# Patient Record
Sex: MALE | Race: WHITE | NOT HISPANIC OR LATINO | URBAN - METROPOLITAN AREA
[De-identification: names, ages, dates, MRNs, and addresses within clinical notes are randomized per-mention and may not be internally consistent; named-entity substitution may affect disease eponyms.]

---

## 2019-01-30 ENCOUNTER — APPOINTMENT (RX ONLY)
Dept: URBAN - METROPOLITAN AREA CLINIC 31 | Facility: CLINIC | Age: 59
Setting detail: DERMATOLOGY
End: 2019-01-30

## 2019-01-30 DIAGNOSIS — L82.1 OTHER SEBORRHEIC KERATOSIS: ICD-10-CM

## 2019-01-30 DIAGNOSIS — L81.4 OTHER MELANIN HYPERPIGMENTATION: ICD-10-CM

## 2019-01-30 DIAGNOSIS — D18.0 HEMANGIOMA: ICD-10-CM

## 2019-01-30 DIAGNOSIS — L57.0 ACTINIC KERATOSIS: ICD-10-CM

## 2019-01-30 DIAGNOSIS — D22 MELANOCYTIC NEVI: ICD-10-CM

## 2019-01-30 DIAGNOSIS — D485 NEOPLASM OF UNCERTAIN BEHAVIOR OF SKIN: ICD-10-CM

## 2019-01-30 PROBLEM — D18.01 HEMANGIOMA OF SKIN AND SUBCUTANEOUS TISSUE: Status: ACTIVE | Noted: 2019-01-30

## 2019-01-30 PROBLEM — D22.5 MELANOCYTIC NEVI OF TRUNK: Status: ACTIVE | Noted: 2019-01-30

## 2019-01-30 PROBLEM — D48.5 NEOPLASM OF UNCERTAIN BEHAVIOR OF SKIN: Status: ACTIVE | Noted: 2019-01-30

## 2019-01-30 PROBLEM — J30.1 ALLERGIC RHINITIS DUE TO POLLEN: Status: ACTIVE | Noted: 2019-01-30

## 2019-01-30 PROCEDURE — ? BIOPSY BY SHAVE METHOD

## 2019-01-30 PROCEDURE — ? LIQUID NITROGEN

## 2019-01-30 PROCEDURE — ? SUNSCREEN RECOMMENDATIONS

## 2019-01-30 PROCEDURE — 11102 TANGNTL BX SKIN SINGLE LES: CPT

## 2019-01-30 PROCEDURE — ? COUNSELING

## 2019-01-30 PROCEDURE — 99214 OFFICE O/P EST MOD 30 MIN: CPT | Mod: 25

## 2019-01-30 PROCEDURE — 17000 DESTRUCT PREMALG LESION: CPT | Mod: 59

## 2019-01-30 ASSESSMENT — LOCATION DETAILED DESCRIPTION DERM
LOCATION DETAILED: NASAL DORSUM
LOCATION DETAILED: LEFT SUPERIOR MEDIAL UPPER BACK
LOCATION DETAILED: PERIUMBILICAL SKIN
LOCATION DETAILED: SUPERIOR THORACIC SPINE

## 2019-01-30 ASSESSMENT — LOCATION SIMPLE DESCRIPTION DERM
LOCATION SIMPLE: ABDOMEN
LOCATION SIMPLE: NOSE
LOCATION SIMPLE: UPPER BACK
LOCATION SIMPLE: LEFT UPPER BACK

## 2019-01-30 ASSESSMENT — LOCATION ZONE DERM
LOCATION ZONE: TRUNK
LOCATION ZONE: NOSE

## 2019-01-30 NOTE — PROCEDURE: BIOPSY BY SHAVE METHOD
Billing Type: Third-Party Bill
Type Of Destruction Used: Electrodesiccation
Anesthesia Volume In Cc (Will Not Render If 0): 0.2
X Size Of Lesion In Cm: 0
Bill 31054 For Specimen Handling/Conveyance To Laboratory?: no
Biopsy Type: H and E
Was A Bandage Applied: Yes
Detail Level: Detailed
Silver Nitrate Text: The wound bed was treated with silver nitrate after the biopsy was performed.
Hemostasis: Drysol
Biopsy Method: double edge Personna blade
Electrodesiccation And Curettage Text: The wound bed was treated with electrodesiccation and curettage after the biopsy was performed.
Post-Care Instructions: I reviewed with the patient in detail post-care instructions. Patient is to keep the biopsy covered and apply vaseline twice daily until healed.
Dressing: bandage
Consent: Written consent was obtained and risks were reviewed including but not limited to scarring, infection, bleeding, scabbing, incomplete removal, nerve damage and allergy to anesthesia.
Lab: -20
Notification Instructions: Patient will be notified of biopsy results. However, patient instructed to call the office if not contacted within 2 weeks.
Curettage Text: The wound bed was treated with curettage after the biopsy was performed.
Wound Care: Petrolatum
Lab Facility: [id number]
Electrodesiccation Text: The wound bed was treated with electrodesiccation after the biopsy was performed.
Depth Of Biopsy: dermis
Anesthesia Type: 1% lidocaine without epinephrine
Cryotherapy Text: The wound bed was treated with cryotherapy after the biopsy was performed.

## 2019-01-30 NOTE — PROCEDURE: LIQUID NITROGEN
Post-Care Instructions: I reviewed with the patient in detail post-care instructions. Patient is to wear sunprotection, and avoid picking at any of the treated lesions. Pt may apply Vaseline to crusted or scabbing areas.
Render Post-Care Instructions In Note?: yes
Detail Level: Detailed
Duration Of Freeze Thaw-Cycle (Seconds): 3
Consent: The patient's consent was obtained including but not limited to risks of crusting, scabbing, blistering, scarring, darker or lighter pigmentary change, recurrence, incomplete removal and infection.
Number Of Freeze-Thaw Cycles: 1 freeze-thaw cycle

## 2019-04-11 ENCOUNTER — APPOINTMENT (RX ONLY)
Dept: URBAN - METROPOLITAN AREA CLINIC 26 | Facility: CLINIC | Age: 59
Setting detail: DERMATOLOGY
End: 2019-04-11

## 2019-04-11 DIAGNOSIS — D22 MELANOCYTIC NEVI: ICD-10-CM

## 2019-04-11 PROBLEM — D22.5 MELANOCYTIC NEVI OF TRUNK: Status: ACTIVE | Noted: 2019-04-11

## 2019-04-11 PROCEDURE — 11402 EXC TR-EXT B9+MARG 1.1-2 CM: CPT

## 2019-04-11 PROCEDURE — ? EXCISION

## 2019-04-11 PROCEDURE — 12032 INTMD RPR S/A/T/EXT 2.6-7.5: CPT

## 2019-04-11 ASSESSMENT — LOCATION SIMPLE DESCRIPTION DERM: LOCATION SIMPLE: LEFT UPPER BACK

## 2019-04-11 ASSESSMENT — LOCATION ZONE DERM: LOCATION ZONE: TRUNK

## 2019-04-11 ASSESSMENT — LOCATION DETAILED DESCRIPTION DERM: LOCATION DETAILED: LEFT SUPERIOR MEDIAL UPPER BACK

## 2019-04-26 ENCOUNTER — APPOINTMENT (RX ONLY)
Dept: URBAN - METROPOLITAN AREA CLINIC 26 | Facility: CLINIC | Age: 59
Setting detail: DERMATOLOGY
End: 2019-04-26

## 2019-04-26 DIAGNOSIS — Z48.817 ENCOUNTER FOR SURGICAL AFTERCARE FOLLOWING SURGERY ON THE SKIN AND SUBCUTANEOUS TISSUE: ICD-10-CM

## 2019-04-26 PROCEDURE — ? PHOTO-DOCUMENTATION

## 2019-04-26 PROCEDURE — ? SUTURE REMOVAL (GLOBAL PERIOD)

## 2019-04-26 PROCEDURE — 99024 POSTOP FOLLOW-UP VISIT: CPT

## 2019-04-26 PROCEDURE — ? POST-OP WOUND CHECK

## 2019-04-26 ASSESSMENT — LOCATION DETAILED DESCRIPTION DERM: LOCATION DETAILED: RIGHT SUPERIOR UPPER BACK

## 2019-04-26 ASSESSMENT — LOCATION SIMPLE DESCRIPTION DERM: LOCATION SIMPLE: RIGHT UPPER BACK

## 2019-04-26 ASSESSMENT — LOCATION ZONE DERM: LOCATION ZONE: TRUNK

## 2019-04-26 NOTE — PROCEDURE: SUTURE REMOVAL (GLOBAL PERIOD)
Detail Level: Detailed
Add 56212 Cpt? (Important Note: In 2017 The Use Of 60181 Is Being Tracked By Cms To Determine Future Global Period Reimbursement For Global Periods): yes

## 2019-04-26 NOTE — PROCEDURE: POST-OP WOUND CHECK
Add 79781 Cpt? (Important Note: In 2017 The Use Of 60031 Is Being Tracked By Cms To Determine Future Global Period Reimbursement For Global Periods): yes
Detail Level: Detailed

## 2020-01-01 NOTE — PROCEDURE: EXCISION
EOAE (evoked otoacoustic emission) Keystone Flap Text: The defect edges were debeveled with a #15 scalpel blade.  Given the location of the defect, shape of the defect a keystone flap was deemed most appropriate.  Using a sterile surgical marker, an appropriate keystone flap was drawn incorporating the defect, outlining the appropriate donor tissue and placing the expected incisions within the relaxed skin tension lines where possible. The area thus outlined was incised deep to adipose tissue with a #15 scalpel blade.  The skin margins were undermined to an appropriate distance in all directions around the primary defect and laterally outward around the flap utilizing iris scissors.

## 2021-01-08 ENCOUNTER — HOSPITAL ENCOUNTER (OUTPATIENT)
Dept: RADIOLOGY | Facility: CLINIC | Age: 61
Discharge: HOME | End: 2021-01-08
Attending: PSYCHIATRY & NEUROLOGY
Payer: COMMERCIAL

## 2021-01-08 VITALS — WEIGHT: 194 LBS | HEIGHT: 70 IN | BODY MASS INDEX: 27.77 KG/M2

## 2021-01-08 DIAGNOSIS — G31.84 MILD COGNITIVE IMPAIRMENT, SO STATED: ICD-10-CM

## 2021-04-12 ENCOUNTER — IMMUNIZATIONS (OUTPATIENT)
Dept: FAMILY MEDICINE CLINIC | Facility: HOSPITAL | Age: 61
End: 2021-04-12

## 2021-04-12 DIAGNOSIS — Z23 ENCOUNTER FOR IMMUNIZATION: Primary | ICD-10-CM

## 2021-04-12 PROCEDURE — 0001A SARS-COV-2 / COVID-19 MRNA VACCINE (PFIZER-BIONTECH) 30 MCG: CPT

## 2021-04-12 PROCEDURE — 91300 SARS-COV-2 / COVID-19 MRNA VACCINE (PFIZER-BIONTECH) 30 MCG: CPT

## 2021-05-06 ENCOUNTER — IMMUNIZATIONS (OUTPATIENT)
Dept: FAMILY MEDICINE CLINIC | Facility: HOSPITAL | Age: 61
End: 2021-05-06

## 2021-05-06 DIAGNOSIS — Z23 ENCOUNTER FOR IMMUNIZATION: Primary | ICD-10-CM

## 2021-05-06 PROCEDURE — 0002A SARS-COV-2 / COVID-19 MRNA VACCINE (PFIZER-BIONTECH) 30 MCG: CPT

## 2021-05-06 PROCEDURE — 91300 SARS-COV-2 / COVID-19 MRNA VACCINE (PFIZER-BIONTECH) 30 MCG: CPT

## 2021-10-21 ENCOUNTER — APPOINTMENT (RX ONLY)
Dept: URBAN - METROPOLITAN AREA CLINIC 374 | Facility: CLINIC | Age: 61
Setting detail: DERMATOLOGY
End: 2021-10-21

## 2021-10-21 DIAGNOSIS — D22 MELANOCYTIC NEVI: ICD-10-CM

## 2021-10-21 DIAGNOSIS — L81.4 OTHER MELANIN HYPERPIGMENTATION: ICD-10-CM

## 2021-10-21 DIAGNOSIS — Z71.89 OTHER SPECIFIED COUNSELING: ICD-10-CM

## 2021-10-21 DIAGNOSIS — D18.0 HEMANGIOMA: ICD-10-CM

## 2021-10-21 DIAGNOSIS — T07XXXA ABRASION OR FRICTION BURN OF OTHER, MULTIPLE, AND UNSPECIFIED SITES, WITHOUT MENTION OF INFECTION: ICD-10-CM

## 2021-10-21 PROBLEM — D22.62 MELANOCYTIC NEVI OF LEFT UPPER LIMB, INCLUDING SHOULDER: Status: ACTIVE | Noted: 2021-10-21

## 2021-10-21 PROBLEM — D18.01 HEMANGIOMA OF SKIN AND SUBCUTANEOUS TISSUE: Status: ACTIVE | Noted: 2021-10-21

## 2021-10-21 PROBLEM — L30.9 DERMATITIS, UNSPECIFIED: Status: ACTIVE | Noted: 2021-10-21

## 2021-10-21 PROBLEM — D48.5 NEOPLASM OF UNCERTAIN BEHAVIOR OF SKIN: Status: ACTIVE | Noted: 2021-10-21

## 2021-10-21 PROBLEM — D22.71 MELANOCYTIC NEVI OF RIGHT LOWER LIMB, INCLUDING HIP: Status: ACTIVE | Noted: 2021-10-21

## 2021-10-21 PROBLEM — D22.5 MELANOCYTIC NEVI OF TRUNK: Status: ACTIVE | Noted: 2021-10-21

## 2021-10-21 PROBLEM — D22.72 MELANOCYTIC NEVI OF LEFT LOWER LIMB, INCLUDING HIP: Status: ACTIVE | Noted: 2021-10-21

## 2021-10-21 PROBLEM — D22.61 MELANOCYTIC NEVI OF RIGHT UPPER LIMB, INCLUDING SHOULDER: Status: ACTIVE | Noted: 2021-10-21

## 2021-10-21 PROCEDURE — ? PRESCRIPTION

## 2021-10-21 PROCEDURE — ? PRESCRIPTION MEDICATION MANAGEMENT

## 2021-10-21 PROCEDURE — ? COUNSELING

## 2021-10-21 PROCEDURE — ? DEFER

## 2021-10-21 PROCEDURE — ? SUNSCREEN RECOMMENDATIONS

## 2021-10-21 PROCEDURE — ? FULL BODY SKIN EXAM

## 2021-10-21 PROCEDURE — ? PHOTO-DOCUMENTATION

## 2021-10-21 PROCEDURE — 99213 OFFICE O/P EST LOW 20 MIN: CPT

## 2021-10-21 RX ORDER — SILVER SULFADIAZINE 10 MG/G
CREAM TOPICAL QDAY
Qty: 25 | Refills: 2 | Status: ERX | COMMUNITY
Start: 2021-10-21

## 2021-10-21 RX ADMIN — SILVER SULFADIAZINE: 10 CREAM TOPICAL at 00:00

## 2021-10-21 ASSESSMENT — LOCATION DETAILED DESCRIPTION DERM
LOCATION DETAILED: RIGHT ANTERIOR DISTAL THIGH
LOCATION DETAILED: LEFT MID-UPPER BACK
LOCATION DETAILED: RIGHT DISTAL PRETIBIAL REGION
LOCATION DETAILED: LEFT ANTERIOR DISTAL UPPER ARM
LOCATION DETAILED: LEFT ANTERIOR PROXIMAL UPPER ARM
LOCATION DETAILED: LEFT PROXIMAL PRETIBIAL REGION
LOCATION DETAILED: STERNUM
LOCATION DETAILED: RIGHT ANTERIOR PROXIMAL UPPER ARM
LOCATION DETAILED: RIGHT PROXIMAL PRETIBIAL REGION
LOCATION DETAILED: RIGHT ANTERIOR DISTAL UPPER ARM
LOCATION DETAILED: EPIGASTRIC SKIN
LOCATION DETAILED: RIGHT ANTERIOR PROXIMAL THIGH
LOCATION DETAILED: LEFT KNEE
LOCATION DETAILED: RIGHT SUPERIOR MEDIAL UPPER BACK
LOCATION DETAILED: LEFT INFERIOR UPPER BACK
LOCATION DETAILED: LEFT ANTERIOR PROXIMAL THIGH
LOCATION DETAILED: INFERIOR THORACIC SPINE

## 2021-10-21 ASSESSMENT — LOCATION SIMPLE DESCRIPTION DERM
LOCATION SIMPLE: RIGHT UPPER ARM
LOCATION SIMPLE: RIGHT PRETIBIAL REGION
LOCATION SIMPLE: RIGHT UPPER BACK
LOCATION SIMPLE: CHEST
LOCATION SIMPLE: UPPER BACK
LOCATION SIMPLE: LEFT THIGH
LOCATION SIMPLE: LEFT PRETIBIAL REGION
LOCATION SIMPLE: LEFT UPPER ARM
LOCATION SIMPLE: ABDOMEN
LOCATION SIMPLE: LEFT KNEE
LOCATION SIMPLE: RIGHT THIGH
LOCATION SIMPLE: LEFT UPPER BACK

## 2021-10-21 ASSESSMENT — LOCATION ZONE DERM
LOCATION ZONE: TRUNK
LOCATION ZONE: ARM
LOCATION ZONE: LEG

## 2021-10-21 NOTE — PROCEDURE: PRESCRIPTION MEDICATION MANAGEMENT
Render In Strict Bullet Format?: No
Initiate Treatment: Silvadene 1 % topical cream Qday: Apply to the aa of leg qday
Detail Level: Zone

## 2021-10-21 NOTE — PROCEDURE: FULL BODY SKIN EXAM
Body Of Note (Please Add Your Own Text Here): monitor annually
Price (Do Not Change): 0.00
Instructions: This plan will send the code FBSE to the PM system.  DO NOT or CHANGE the price.
Detail Level: Generalized

## 2021-11-04 ENCOUNTER — APPOINTMENT (RX ONLY)
Dept: URBAN - METROPOLITAN AREA CLINIC 374 | Facility: CLINIC | Age: 61
Setting detail: DERMATOLOGY
End: 2021-11-04

## 2021-11-04 DIAGNOSIS — D485 NEOPLASM OF UNCERTAIN BEHAVIOR OF SKIN: ICD-10-CM

## 2021-11-04 PROBLEM — D48.5 NEOPLASM OF UNCERTAIN BEHAVIOR OF SKIN: Status: ACTIVE | Noted: 2021-11-04

## 2021-11-04 PROCEDURE — ? PHOTO-DOCUMENTATION

## 2021-11-04 PROCEDURE — 11102 TANGNTL BX SKIN SINGLE LES: CPT

## 2021-11-04 PROCEDURE — ? BIOPSY BY SHAVE METHOD

## 2021-11-04 PROCEDURE — 11103 TANGNTL BX SKIN EA SEP/ADDL: CPT

## 2021-11-04 ASSESSMENT — LOCATION ZONE DERM
LOCATION ZONE: LEG
LOCATION ZONE: TRUNK

## 2021-11-04 ASSESSMENT — LOCATION SIMPLE DESCRIPTION DERM
LOCATION SIMPLE: LEFT LOWER BACK
LOCATION SIMPLE: LEFT PRETIBIAL REGION
LOCATION SIMPLE: RIGHT THIGH

## 2021-11-04 ASSESSMENT — LOCATION DETAILED DESCRIPTION DERM
LOCATION DETAILED: LEFT SUPERIOR MEDIAL LOWER BACK
LOCATION DETAILED: LEFT LATERAL PROXIMAL PRETIBIAL REGION
LOCATION DETAILED: RIGHT ANTERIOR DISTAL THIGH

## 2021-11-04 NOTE — PROCEDURE: BIOPSY BY SHAVE METHOD
Detail Level: Detailed
Depth Of Biopsy: dermis
Was A Bandage Applied: Yes
Size Of Lesion In Cm: 0
Biopsy Type: H and E
Biopsy Method: Dermablade
Anesthesia Type: 1% lidocaine with epinephrine
Anesthesia Volume In Cc (Will Not Render If 0): 0.3
Hemostasis: Electrocautery and Aluminum Chloride
Wound Care: Petrolatum
Dressing: bandage
Destruction After The Procedure: No
Type Of Destruction Used: Curettage
Curettage Text: The wound bed was treated with curettage after the biopsy was performed.
Cryotherapy Text: The wound bed was treated with cryotherapy after the biopsy was performed.
Electrodesiccation Text: The wound bed was treated with electrodesiccation after the biopsy was performed.
Electrodesiccation And Curettage Text: The wound bed was treated with electrodesiccation and curettage after the biopsy was performed.
Silver Nitrate Text: The wound bed was treated with silver nitrate after the biopsy was performed.
Lab: -155
Consent: Written consent was obtained and risks were reviewed including but not limited to scarring, infection, bleeding, scabbing, incomplete removal, nerve damage and allergy to anesthesia.
Post-Care Instructions: I reviewed with the patient in detail post-care instructions. Patient is to keep the biopsy site dry overnight, and then apply bacitracin twice daily until healed. Patient may apply hydrogen peroxide soaks to remove any crusting.
Notification Instructions: Patient will be notified of biopsy results. However, patient instructed to call the office if not contacted within 2 weeks.
Billing Type: Third-Party Bill
Information: Selecting Yes will display possible errors in your note based on the variables you have selected. This validation is only offered as a suggestion for you. PLEASE NOTE THAT THE VALIDATION TEXT WILL BE REMOVED WHEN YOU FINALIZE YOUR NOTE. IF YOU WANT TO FAX A PRELIMINARY NOTE YOU WILL NEED TO TOGGLE THIS TO 'NO' IF YOU DO NOT WANT IT IN YOUR FAXED NOTE.
Path Notes (To The Dermatopathologist): Please check margins

## 2022-08-05 ENCOUNTER — TELEPHONE (OUTPATIENT)
Dept: GASTROENTEROLOGY | Facility: CLINIC | Age: 62
End: 2022-08-05

## 2022-08-05 NOTE — TELEPHONE ENCOUNTER
[facility] alert notes due for colonoscopy 9/2022  I informed patient scheduling would be in contact with him

## 2022-08-05 NOTE — TELEPHONE ENCOUNTER
Patients GI provider:  Dr Jennifer Laird    Number to return call: [phone]    Reason for call: Pt calling asking when he is due for next colonoscopy      Scheduled procedure/appointment date if applicable: N/A

## 2022-10-19 ENCOUNTER — TELEPHONE (OUTPATIENT)
Dept: GASTROENTEROLOGY | Facility: CLINIC | Age: 62
End: 2022-10-19

## 2022-10-19 DIAGNOSIS — Z12.11 ENCOUNTER FOR SCREENING COLONOSCOPY: Primary | ICD-10-CM

## 2022-10-19 NOTE — TELEPHONE ENCOUNTER
Scheduled date of colonoscopy (as of today):10/28/22  Physician performing colonoscopy:Dr Andrés Forrester  Location of colonoscopy:[facility]  Bowel prep reviewed with patient:Colfidencio  Instructions reviewed with patient by: Alphonse Shahid  Clearances: No

## 2022-10-25 ENCOUNTER — TELEPHONE (OUTPATIENT)
Dept: GASTROENTEROLOGY | Facility: CLINIC | Age: 62
End: 2022-10-25

## 2022-10-25 NOTE — TELEPHONE ENCOUNTER
Patients GI provider:  Dr Deedee Matias    Number to return call: [phone]    Reason for call: Pt calling to reschedule colonoscopy  Procedure was cancelled for 10/28/2022      Scheduled procedure/appointment date if applicable: N/A

## 2022-11-01 ENCOUNTER — APPOINTMENT (RX ONLY)
Dept: URBAN - METROPOLITAN AREA CLINIC 26 | Facility: CLINIC | Age: 62
Setting detail: DERMATOLOGY
End: 2022-11-01

## 2022-11-01 DIAGNOSIS — L82.1 OTHER SEBORRHEIC KERATOSIS: ICD-10-CM

## 2022-11-01 DIAGNOSIS — D18.0 HEMANGIOMA: ICD-10-CM

## 2022-11-01 DIAGNOSIS — Z80.8 FAMILY HISTORY OF MALIGNANT NEOPLASM OF OTHER ORGANS OR SYSTEMS: ICD-10-CM

## 2022-11-01 DIAGNOSIS — L81.4 OTHER MELANIN HYPERPIGMENTATION: ICD-10-CM

## 2022-11-01 DIAGNOSIS — D22 MELANOCYTIC NEVI: ICD-10-CM

## 2022-11-01 DIAGNOSIS — Z87.2 PERSONAL HISTORY OF DISEASES OF THE SKIN AND SUBCUTANEOUS TISSUE: ICD-10-CM

## 2022-11-01 PROBLEM — D18.01 HEMANGIOMA OF SKIN AND SUBCUTANEOUS TISSUE: Status: ACTIVE | Noted: 2022-11-01

## 2022-11-01 PROBLEM — D22.5 MELANOCYTIC NEVI OF TRUNK: Status: ACTIVE | Noted: 2022-11-01

## 2022-11-01 PROCEDURE — ? SUNSCREEN RECOMMENDATIONS

## 2022-11-01 PROCEDURE — ? FULL BODY SKIN EXAM

## 2022-11-01 PROCEDURE — 99213 OFFICE O/P EST LOW 20 MIN: CPT

## 2022-11-01 PROCEDURE — ? COUNSELING

## 2022-11-01 ASSESSMENT — LOCATION DETAILED DESCRIPTION DERM: LOCATION DETAILED: SUPERIOR THORACIC SPINE

## 2022-11-01 ASSESSMENT — LOCATION SIMPLE DESCRIPTION DERM: LOCATION SIMPLE: UPPER BACK

## 2022-11-01 ASSESSMENT — LOCATION ZONE DERM: LOCATION ZONE: TRUNK

## 2022-11-01 NOTE — PROCEDURE: SUNSCREEN RECOMMENDATIONS
Products Recommended: Recommend SPF 50 or greater for face, 30 or greater for body\\nRecommend mineral sunscreen in all cases (active ingredients zinc oxide and/or titanium dioxide)\\nBrands: Neutrogena, Tizo, La Roche Posay, Elta MD
General Sunscreen Counseling: I recommended a broad spectrum sunscreen with at least SPF of 30, but higher is better.  I explained that SPF 30 sunscreens block approximately 97 percent of the sun's harmful rays in vitro, but in practice a higher SPF offers more protection from sun exposure as most people don't use the density of application to get the number on the body.  Sunscreens should be applied at least 15 minutes prior to expected sun exposure and then every 2 hours after that as long as sun exposure continues. If swimming or exercising sunscreen should be reapplied more frequently.  One ounce, or 30 fluid ounces (the equivalent of a shot glass full of sunscreen), is adequate to protect the skin not covered by a bathing suit. I also recommended a lip balm with a sunscreen as well. Sun protective clothing (UPF50+) can be used in lieu of sunscreen but must be worn the entire time you are exposed to the sun's rays.
Detail Level: Simple

## 2022-11-01 NOTE — PROCEDURE: MIPS QUALITY
Quality 111:Pneumonia Vaccination Status For Older Adults: Pneumococcal vaccine (PPSV23) was not administered on or after patient’s 60th birthday and before the end of the measurement period, reason not otherwise specified
Detail Level: Detailed
Quality 130: Documentation Of Current Medications In The Medical Record: Current Medications Documented
Quality 431: Preventive Care And Screening: Unhealthy Alcohol Use - Screening: Patient not identified as an unhealthy alcohol user when screened for unhealthy alcohol use using a systematic screening method
Quality 226: Preventive Care And Screening: Tobacco Use: Screening And Cessation Intervention: Patient screened for tobacco use and is an ex/non-smoker

## 2022-11-28 ENCOUNTER — TELEPHONE (OUTPATIENT)
Dept: GASTROENTEROLOGY | Facility: CLINIC | Age: 62
End: 2022-11-28

## 2022-11-28 NOTE — TELEPHONE ENCOUNTER
Spoke with patient-emailed colon instructions to patient  If he has any questions he was instructed to give us a call  He has [place]

## 2022-12-07 ENCOUNTER — ANESTHESIA (OUTPATIENT)
Dept: ANESTHESIOLOGY | Facility: AMBULATORY SURGERY CENTER | Age: 62
End: 2022-12-07

## 2022-12-07 ENCOUNTER — ANESTHESIA EVENT (OUTPATIENT)
Dept: ANESTHESIOLOGY | Facility: AMBULATORY SURGERY CENTER | Age: 62
End: 2022-12-07

## 2022-12-07 NOTE — ANESTHESIA PREPROCEDURE EVALUATION
Procedure:  PRE-OP ONLY    Relevant Problems   No relevant active problems             Anesthesia Plan  ASA Score- 2     Anesthesia Type- IV sedation with anesthesia with ASA Monitors  Additional Monitors:   Airway Plan:           Plan Factors-    Chart reviewed  Induction- intravenous  Postoperative Plan-     Informed Consent- Anesthetic plan and risks discussed with patient

## 2022-12-08 ENCOUNTER — HOSPITAL ENCOUNTER (OUTPATIENT)
Dept: GASTROENTEROLOGY | Facility: AMBULATORY SURGERY CENTER | Age: 62
Discharge: HOME | End: 2022-12-08

## 2022-12-08 ENCOUNTER — ANESTHESIA (OUTPATIENT)
Dept: GASTROENTEROLOGY | Facility: AMBULATORY SURGERY CENTER | Age: 62
End: 2022-12-08

## 2022-12-08 ENCOUNTER — ANESTHESIA EVENT (OUTPATIENT)
Dept: GASTROENTEROLOGY | Facility: AMBULATORY SURGERY CENTER | Age: 62
End: 2022-12-08

## 2022-12-08 VITALS
HEIGHT: 70 IN | SYSTOLIC BLOOD PRESSURE: 104 MMHG | RESPIRATION RATE: 19 BRPM | HEART RATE: 64 BPM | OXYGEN SATURATION: 99 % | BODY MASS INDEX: 31.5 KG/M2 | DIASTOLIC BLOOD PRESSURE: 69 MMHG | TEMPERATURE: 98.2 F | WEIGHT: 220 LBS

## 2022-12-08 DIAGNOSIS — Z86.010 HISTORY OF COLON POLYPS: ICD-10-CM

## 2022-12-08 RX ORDER — GLYCOPYRROLATE 0.2 MG/ML
INJECTION INTRAMUSCULAR; INTRAVENOUS AS NEEDED
Status: DISCONTINUED | OUTPATIENT
Start: 2022-12-08 | End: 2022-12-08

## 2022-12-08 RX ORDER — AZELASTINE 1 MG/ML
2 SPRAY, METERED NASAL DAILY
COMMUNITY

## 2022-12-08 RX ORDER — SODIUM CHLORIDE, SODIUM LACTATE, POTASSIUM CHLORIDE, CALCIUM CHLORIDE 600; 310; 30; 20 MG/100ML; MG/100ML; MG/100ML; MG/100ML
50 INJECTION, SOLUTION INTRAVENOUS CONTINUOUS
Status: DISCONTINUED | OUTPATIENT
Start: 2022-12-08 | End: 2022-12-12 | Stop reason: HOSPADM

## 2022-12-08 RX ORDER — LIDOCAINE HYDROCHLORIDE 10 MG/ML
INJECTION, SOLUTION EPIDURAL; INFILTRATION; INTRACAUDAL; PERINEURAL AS NEEDED
Status: DISCONTINUED | OUTPATIENT
Start: 2022-12-08 | End: 2022-12-08

## 2022-12-08 RX ORDER — PROPOFOL 10 MG/ML
INJECTION, EMULSION INTRAVENOUS AS NEEDED
Status: DISCONTINUED | OUTPATIENT
Start: 2022-12-08 | End: 2022-12-08

## 2022-12-08 RX ADMIN — LIDOCAINE HYDROCHLORIDE 5 ML: 10 INJECTION, SOLUTION EPIDURAL; INFILTRATION; INTRACAUDAL; PERINEURAL at 10:18

## 2022-12-08 RX ADMIN — PROPOFOL 40 MG: 10 INJECTION, EMULSION INTRAVENOUS at 10:23

## 2022-12-08 RX ADMIN — GLYCOPYRROLATE 0.1 MG: 0.2 INJECTION INTRAMUSCULAR; INTRAVENOUS at 10:18

## 2022-12-08 RX ADMIN — PROPOFOL 40 MG: 10 INJECTION, EMULSION INTRAVENOUS at 10:24

## 2022-12-08 RX ADMIN — PROPOFOL 120 MG: 10 INJECTION, EMULSION INTRAVENOUS at 10:21

## 2022-12-08 RX ADMIN — SODIUM CHLORIDE, SODIUM LACTATE, POTASSIUM CHLORIDE, CALCIUM CHLORIDE 50 ML/HR: 600; 310; 30; 20 INJECTION, SOLUTION INTRAVENOUS at 09:47

## 2022-12-08 RX ADMIN — PROPOFOL 50 MG: 10 INJECTION, EMULSION INTRAVENOUS at 10:30

## 2022-12-08 NOTE — ANESTHESIA PREPROCEDURE EVALUATION
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

## 2022-12-08 NOTE — H&P
History and Physical - [facility]  Indiana Riley 65 y o  male MRN: [id number]    HPI: Indiana Riley is a 65y o  year old male who presents for rounds colonoscopy  He has a personal history of colon polyps    REVIEW OF SYSTEMS: Per the HPI, and otherwise unremarkable  Historical Information   Past Medical History:   Diagnosis Date   • Cancer [facility])     prostate   • Colon polyp    • Murmur, heart    • Patient denies medical problems      Past Surgical History:   Procedure Laterality Date   • APPENDECTOMY     • COLONOSCOPY     • HAND SURGERY     • PROSTATECTOMY     • TONSILLECTOMY       Social History   Social History     Substance and Sexual Activity   Alcohol Use Yes    Comment: 1 beer per month     Social History     Substance and Sexual Activity   Drug Use Never     Social History     Tobacco Use   Smoking Status Never   Smokeless Tobacco Never     History reviewed  No pertinent family history  Meds/Allergies       Current Outpatient Medications:   •  azelastine (ASTELIN) 0 1 % nasal spray  •  polyethylene glycol (GOLYTELY) 4000 mL solution    Current Facility-Administered Medications:   •  lactated ringers infusion, 50 mL/hr, Intravenous, Continuous, Continue from Pre-op at 12/08/22 1012    Allergies   Allergen Reactions   • Penicillins Other (See Comments)     Unsure as a child   • Seasonal Ic [Cholestatin] Sneezing       Objective     /68   Pulse 62   Temp 98 2 °F (36 8 °C) (Temporal)   Resp (!) 10   Ht 5' 10" (1 778 m)   Wt 99 8 kg (220 lb)   SpO2 97%   BMI 31 57 kg/m²     PHYSICAL EXAM    Gen: NAD AAOx3  Head: Normocephalic, Atraumatic  CV: S1S2 RRR no m/r/g  CHEST: Clear b/l no c/r/w  ABD: soft, +BS NT/ND  EXT: no edema    ASSESSMENT/PLAN:  This is a 65y o  year old male here for colonoscopy and he is stable and optimized for his procedure

## 2022-12-08 NOTE — ANESTHESIA POSTPROCEDURE EVALUATION
Post-Op Assessment Note    CV Status:  Stable  Pain Score: 0    Pain management: adequate     Mental Status:  Alert and awake   Hydration Status:  Euvolemic   PONV Controlled:  Controlled   Airway Patency:  Patent      Post Op Vitals Reviewed: Yes      Staff: Anesthesiologist, CRNA         No notable events documented      BP      Temp      Pulse     Resp     SpO2

## 2023-04-14 LAB — HBA1C MFR BLD HPLC: 5.9 %

## 2023-05-05 ENCOUNTER — OFFICE VISIT (OUTPATIENT)
Dept: FAMILY MEDICINE CLINIC | Facility: HOSPITAL | Age: 63
End: 2023-05-05

## 2023-05-05 VITALS
OXYGEN SATURATION: 98 % | HEART RATE: 68 BPM | WEIGHT: 225 LBS | DIASTOLIC BLOOD PRESSURE: 71 MMHG | BODY MASS INDEX: 32.21 KG/M2 | SYSTOLIC BLOOD PRESSURE: 116 MMHG | TEMPERATURE: 97.7 F | HEIGHT: 70 IN

## 2023-05-05 DIAGNOSIS — C61 MALIGNANT NEOPLASM OF PROSTATE (HCC): ICD-10-CM

## 2023-05-05 DIAGNOSIS — N52.9 ERECTILE DYSFUNCTION, UNSPECIFIED ERECTILE DYSFUNCTION TYPE: ICD-10-CM

## 2023-05-05 DIAGNOSIS — E78.00 PURE HYPERCHOLESTEROLEMIA: Primary | ICD-10-CM

## 2023-05-05 DIAGNOSIS — H91.92 HEARING LOSS ASSOCIATED WITH SYNDROME OF LEFT EAR: ICD-10-CM

## 2023-05-05 DIAGNOSIS — R73.9 HYPERGLYCEMIA: ICD-10-CM

## 2023-05-05 DIAGNOSIS — I38 HEART VALVE DISEASE: ICD-10-CM

## 2023-05-05 PROBLEM — H81.10 BENIGN PAROXYSMAL POSITIONAL VERTIGO: Status: ACTIVE | Noted: 2023-05-05

## 2023-05-05 PROBLEM — N52.31 ERECTILE DYSFUNCTION AFTER RADICAL PROSTATECTOMY: Status: ACTIVE | Noted: 2023-01-20

## 2023-05-05 PROBLEM — E78.5 HYPERLIPIDEMIA: Status: ACTIVE | Noted: 2023-05-05

## 2023-05-05 PROBLEM — R97.20 ELEVATED PSA: Status: ACTIVE | Noted: 2020-09-11

## 2023-05-05 PROBLEM — R39.15 URINARY URGENCY: Status: ACTIVE | Noted: 2020-09-11

## 2023-05-05 PROBLEM — R35.1 NOCTURIA: Status: ACTIVE | Noted: 2020-09-11

## 2023-05-05 RX ORDER — TADALAFIL 10 MG/1
TABLET ORAL
Qty: 10 TABLET | Refills: 3 | Status: SHIPPED | OUTPATIENT
Start: 2023-05-05

## 2023-05-05 RX ORDER — SILDENAFIL 50 MG/1
50 TABLET, FILM COATED ORAL DAILY PRN
COMMUNITY

## 2023-05-05 NOTE — PROGRESS NOTES
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    3  Malignant neoplasm of prostate ([address] )    4  Heart valve disease  Comments:  Reported aortic sclerosis    5  Hyperglycemia  -     HEMOGLOBIN A1C W/ EAG ESTIMATION; Future; Expected date: 2023  -     HEMOGLOBIN A1C W/ EAG ESTIMATION    6  Erectile dysfunction, unspecified erectile dysfunction type  -     tadalafil (CIALIS) 10 MG tablet; Take 1/2 or 1 tablet every 36 hours as needed for sexual activity      BMI Counseling: Body mass index is 32 28 kg/m²  The BMI is above normal  Nutrition recommendations include decreasing portion sizes, encouraging healthy choices of fruits and vegetables, limiting drinks that contain sugar, moderation in carbohydrate intake and reducing intake of cholesterol  Exercise recommendations include vigorous physical activity 75 minutes/week  No pharmacotherapy was ordered  Rationale for BMI follow-up plan is due to patient being overweight or obese  Depression Screening and Follow-up Plan: Patient was screened for depression during today's encounter  They screened negative with a PHQ-2 score of 0  Subjective     New patient establishing care  Extended discussion and review of medical issues    Notes his decline in issues since he had prostate surgery- weight gain and abnormal tests in that year    Was seeing a weight management physician, restarted about 2 months ago    Viagra seems to have clouding of his head      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  Has 2 sons and 2 grandkids    F/H diabetes daughter and mother    Had TTE for echo with cardiologist Dr Osbaldo Arnett    Review of Systems   Constitutional: Positive for unexpected weight change  HENT: Negative  Respiratory: Negative  Cardiovascular: Negative  Genitourinary: Negative  Musculoskeletal: Negative  Neurological: Negative  Hematological: Negative  Psychiatric/Behavioral: Negative  All other systems reviewed and are negative        Past Medical History:   Diagnosis Date   • Cancer [facility])     prostate   • Colon polyp    • Murmur, heart    • Patient denies medical problems      Past Surgical History:   Procedure Laterality Date   • APPENDECTOMY     • COLONOSCOPY     • CYST REMOVAL Right 2018    right index finger (knuckle)   • HAND SURGERY     • PROSTATECTOMY     • TONSILLECTOMY       Family History   Problem Relation Age of Onset   • Diabetes Mother    • Stroke Father    • Diabetes Sister      Social History     Socioeconomic History   • Marital status: /Civil Union     Spouse name: None   • Number of children: None   • Years of education: None   • Highest education level: None   Occupational History   • None   Tobacco Use   • Smoking status: Never   • Smokeless tobacco: Never   Vaping Use   • Vaping Use: Never used   Substance and Sexual Activity   • Alcohol use: Yes     Comment: 1 beer per month   • Drug use: Never   • Sexual activity: None   Other Topics Concern   • None   Social History Narrative   • None     Social Determinants of Health     Financial Resource Strain: Not on file   Food Insecurity: Not on file   Transportation Needs: Not on file   Physical Activity: Not on file   Stress: Not on file   Social Connections: Not on file   Intimate Partner Violence: Not on file   Housing Stability: Not on file     Current Outpatient Medications on File Prior to Visit   Medication Sig   • azelastine (ASTELIN) 0 1 % nasal spray 2 sprays into each nostril in the "morning Use in each nostril as directed   • sildenafil (VIAGRA) 50 MG tablet Take 50 mg by mouth daily as needed for erectile dysfunction     Allergies   Allergen Reactions   • Penicillins Other (See Comments)     Unsure as a child   • Seasonal Ic [Cholestatin] Sneezing     Immunization History   Administered Date(s) Administered   • COVID-19 PFIZER VACCINE 0 3 ML IM 04/12/2021, 05/06/2021, 10/12/2022   • Typhoid, ViCPs 05/13/2017       Objective     /71 (BP Location: Left arm, Patient Position: Sitting, Cuff Size: Large)   Pulse 68   Temp 97 7 °F (36 5 °C) (Tympanic)   Ht 5' 10\" (1 778 m)   Wt 102 kg (225 lb)   SpO2 98%   BMI 32 28 kg/m²     Physical Exam  Vitals and nursing note reviewed  Constitutional:       Appearance: Normal appearance  HENT:      Right Ear: Tympanic membrane and ear canal normal       Left Ear: Tympanic membrane and ear canal normal       Nose: Nose normal       Mouth/Throat:      Mouth: Mucous membranes are moist    Eyes:      Pupils: Pupils are equal, round, and reactive to light  Neck:      Vascular: No carotid bruit  Cardiovascular:      Rate and Rhythm: Normal rate and regular rhythm  Heart sounds: Murmur heard  Pulmonary:      Effort: Pulmonary effort is normal       Breath sounds: Normal breath sounds  Musculoskeletal:      Right lower leg: No edema  Left lower leg: No edema  Neurological:      General: No focal deficit present  Mental Status: He is alert     Psychiatric:         Mood and Affect: Mood normal        Michael Garduno MD  "

## 2023-09-06 ENCOUNTER — RA CDI HCC (OUTPATIENT)
Dept: OTHER | Facility: HOSPITAL | Age: 63
End: 2023-09-06

## 2023-09-06 NOTE — PROGRESS NOTES
[address] [facility] coding opportunities       Chart reviewed, no opportunity found: CHART REVIEWED, NO OPPORTUNITY FOUND        Patients Insurance        Commercial Insurance: [employer]

## 2023-09-07 LAB
ALBUMIN SERPL-MCNC: 4.5 G/DL (ref 3.9–4.9)
ALBUMIN/GLOB SERPL: 2.4 {RATIO} (ref 1.2–2.2)
ALP SERPL-CCNC: 57 IU/L (ref 44–121)
ALT SERPL-CCNC: 16 IU/L (ref 0–44)
AST SERPL-CCNC: 18 IU/L (ref 0–40)
BILIRUB SERPL-MCNC: 0.3 MG/DL (ref 0–1.2)
BUN SERPL-MCNC: 17 MG/DL (ref 8–27)
BUN/CREAT SERPL: 17 (ref 10–24)
CALCIUM SERPL-MCNC: 9.2 MG/DL (ref 8.6–10.2)
CHLORIDE SERPL-SCNC: 106 MMOL/L (ref 96–106)
CHOLEST SERPL-MCNC: 146 MG/DL (ref 100–199)
CO2 SERPL-SCNC: 24 MMOL/L (ref 20–29)
CREAT SERPL-MCNC: 0.99 MG/DL (ref 0.76–1.27)
EGFR: 86 ML/MIN/1.73
EST. AVERAGE GLUCOSE BLD GHB EST-MCNC: 123 MG/DL
GLOBULIN SER-MCNC: 1.9 G/DL (ref 1.5–4.5)
GLUCOSE SERPL-MCNC: 100 MG/DL (ref 70–99)
HBA1C MFR BLD: 5.9 % (ref 4.8–5.6)
HDLC SERPL-MCNC: 60 MG/DL
LDLC SERPL CALC-MCNC: 74 MG/DL (ref 0–99)
LDLC/HDLC SERPL: 1.2 RATIO (ref 0–3.6)
POTASSIUM SERPL-SCNC: 4.4 MMOL/L (ref 3.5–5.2)
PROT SERPL-MCNC: 6.4 G/DL (ref 6–8.5)
SL AMB VLDL CHOLESTEROL CALC: 12 MG/DL (ref 5–40)
SODIUM SERPL-SCNC: 140 MMOL/L (ref 134–144)
TRIGL SERPL-MCNC: 57 MG/DL (ref 0–149)

## 2023-09-11 ENCOUNTER — OFFICE VISIT (OUTPATIENT)
Dept: FAMILY MEDICINE CLINIC | Facility: HOSPITAL | Age: 63
End: 2023-09-11
Payer: COMMERCIAL

## 2023-09-11 VITALS
WEIGHT: 223.2 LBS | HEIGHT: 69 IN | HEART RATE: 92 BPM | DIASTOLIC BLOOD PRESSURE: 64 MMHG | BODY MASS INDEX: 33.06 KG/M2 | OXYGEN SATURATION: 97 % | TEMPERATURE: 99 F | SYSTOLIC BLOOD PRESSURE: 124 MMHG

## 2023-09-11 DIAGNOSIS — C61 MALIGNANT NEOPLASM OF PROSTATE (HCC): ICD-10-CM

## 2023-09-11 DIAGNOSIS — Z23 ENCOUNTER FOR IMMUNIZATION: ICD-10-CM

## 2023-09-11 DIAGNOSIS — R73.9 HYPERGLYCEMIA: ICD-10-CM

## 2023-09-11 DIAGNOSIS — Z00.00 WELL ADULT EXAM: Primary | ICD-10-CM

## 2023-09-11 DIAGNOSIS — E78.2 MIXED HYPERLIPIDEMIA: ICD-10-CM

## 2023-09-11 DIAGNOSIS — Z97.4 WEARS HEARING AID IN LEFT EAR: ICD-10-CM

## 2023-09-11 PROCEDURE — 99214 OFFICE O/P EST MOD 30 MIN: CPT | Performed by: FAMILY MEDICINE

## 2023-09-11 PROCEDURE — 90471 IMMUNIZATION ADMIN: CPT

## 2023-09-11 PROCEDURE — 90715 TDAP VACCINE 7 YRS/> IM: CPT

## 2023-09-11 PROCEDURE — 99396 PREV VISIT EST AGE 40-64: CPT | Performed by: FAMILY MEDICINE

## 2023-09-11 RX ORDER — ROSUVASTATIN CALCIUM 5 MG/1
TABLET, COATED ORAL
COMMUNITY
Start: 2023-06-15

## 2023-09-11 NOTE — PROGRESS NOTES
[address] [facility] PRIMARY CARE [address]     NAME: Rian Dan  AGE: 59 y.o. SEX: male  : 1960     DATE: 2023     Assessment and Plan:     Problem List Items Addressed This Visit        Genitourinary    Malignant neoplasm of prostate ([address])       Other    Hyperlipidemia    Relevant Orders    Lipid Panel with Direct LDL reflex    Hyperglycemia    Relevant Orders    Comprehensive metabolic panel    HEMOGLOBIN A1C W/ EAG ESTIMATION    Well adult exam - Primary    Wears hearing aid in left ear   Other Visit Diagnoses     Encounter for immunization        Relevant Orders    TDAP VACCINE GREATER THAN OR EQUAL TO 8YO IM (Completed)          Immunizations and preventive care screenings were discussed with patient today. Appropriate education was printed on patient's after visit summary. Discussed risks and benefits of prostate cancer screening. We discussed the controversial history of PSA screening for prostate cancer in the [facility] as well as the risk of over detection and over treatment of prostate cancer by way of PSA screening. The patient understands that PSA blood testing is an imperfect way to screen for prostate cancer and that elevated PSA levels in the blood may also be caused by infection, inflammation, prostatic trauma or manipulation, urological procedures, or by benign prostatic enlargement. The role of the digital rectal examination in prostate cancer screening was also discussed and I discussed with him that there is large interobserver variability in the findings of digital rectal examination. Counseling:  Alcohol/drug use: discussed moderation in alcohol intake, the recommendations for healthy alcohol use, and avoidance of illicit drug use. Dental Health: discussed importance of regular tooth brushing, flossing, and dental visits.   Injury prevention: discussed safety/seat belts, safety helmets, smoke detectors, carbon dioxide detectors, and smoking near bedding or upholstery. · Exercise: the importance of regular exercise/physical activity was discussed. Recommend exercise 3-5 times per week for at least 30 minutes. Depression Screening and Follow-up Plan: Patient was screened for depression during today's encounter. They screened negative with a PHQ-2 score of 0. Return in about 1 year (around 9/11/2024) for Annual physical 40 min. Chief Complaint:     Chief Complaint   Patient presents with   • Physical Exam      History of Present Illness:     Adult Annual Physical   Patient here for a comprehensive physical exam. The patient reports problems - persistent weight despite diet and exercise. Has a  with nutrition information    Sleeping well most nights 70-85 on his FitBit    Has F/U with Dr Tamra Orozco    F/U Dr Anayeli Andersen for Cardiology followup  Diet and Physical Activity  · Diet/Nutrition: well balanced diet. · Exercise: walking. Depression Screening  PHQ-2/9 Depression Screening    Little interest or pleasure in doing things: 0 - not at all  Feeling down, depressed, or hopeless: 0 - not at all  PHQ-2 Score: 0  PHQ-2 Interpretation: Negative depression screen       General Health  · Sleep: sleeps well and gets 7-8 hours of sleep on average. · Hearing: normal - bilateral.  · Vision: no vision problems and goes for regular eye exams. · Dental: regular dental visits and brushes teeth twice daily.  Health  · Symptoms include: none     Review of Systems:     Review of Systems   HENT: Negative. Eyes: Negative. Respiratory: Negative. Cardiovascular: Negative. Genitourinary: Negative. Musculoskeletal: Negative. Neurological: Negative. Hematological: Negative. Psychiatric/Behavioral: Negative. All other systems reviewed and are negative.      Past Medical History:     Past Medical History:   Diagnosis Date   • Cancer [facility])     prostate   • Colon polyp    • HL (hearing loss)    • Murmur, heart    • Obesity    • Patient denies medical problems       Past Surgical History:     Past Surgical History:   Procedure Laterality Date   • APPENDECTOMY     • COLONOSCOPY     • CYST REMOVAL Right 2018    right index finger (knuckle)   • HAND SURGERY     • PROSTATECTOMY     • TONSILLECTOMY        Family History:     Family History   Problem Relation Age of Onset   • Diabetes Mother    • Stroke Father    • Diabetes Sister       Social History:     Social History     Socioeconomic History   • Marital status: /Civil Union     Spouse name: None   • Number of children: None   • Years of education: None   • Highest education level: None   Occupational History   • None   Tobacco Use   • Smoking status: Never   • Smokeless tobacco: Never   Vaping Use   • Vaping Use: Never used   Substance and Sexual Activity   • Alcohol use: Yes     Comment: 1 beer per month   • Drug use: Never   • Sexual activity: Yes     Partners: Female     Birth control/protection: None   Other Topics Concern   • None   Social History Narrative   • None     Social Determinants of Health     Financial Resource Strain: Not on file   Food Insecurity: Not on file   Transportation Needs: Not on file   Physical Activity: Not on file   Stress: Not on file   Social Connections: Not on file   Intimate Partner Violence: Not on file   Housing Stability: Not on file      Current Medications:     Current Outpatient Medications   Medication Sig Dispense Refill   • azelastine (ASTELIN) 0.1 % nasal spray 2 sprays into each nostril in the morning Use in each nostril as directed    ONLY WHEN NEEDED     • rosuvastatin (Crestor) 5 mg tablet      • tadalafil (CIALIS) 10 MG tablet Take 1/2 or 1 tablet every 36 hours as needed for sexual activity 10 tablet 3     No current facility-administered medications for this visit. Allergies:      Allergies   Allergen Reactions   • Penicillins Other (See Comments)     Unsure as a child      Physical Exam:     /64   Pulse 92   Temp 99 °F (37.2 °C)   Ht 5' 9" (1.753 m)   Wt 101 kg (223 lb 3.2 oz)   SpO2 97%   BMI 32.96 kg/m²     Physical Exam  Vitals and nursing note reviewed. Constitutional:       Appearance: Normal appearance. HENT:      Head: Atraumatic. Right Ear: Tympanic membrane, ear canal and external ear normal.      Left Ear: Tympanic membrane, ear canal and external ear normal.      Nose: Nose normal.      Mouth/Throat:      Mouth: Mucous membranes are moist.   Eyes:      Pupils: Pupils are equal, round, and reactive to light. Neck:      Vascular: No carotid bruit. Cardiovascular:      Rate and Rhythm: Normal rate and regular rhythm. Pulses: Normal pulses. Heart sounds: Normal heart sounds. Pulmonary:      Effort: Pulmonary effort is normal.      Breath sounds: Normal breath sounds. Abdominal:      General: Abdomen is flat. Musculoskeletal:         General: Normal range of motion. Right lower leg: No edema. Left lower leg: No edema. Lymphadenopathy:      Cervical: No cervical adenopathy. Skin:     Findings: No rash. Neurological:      General: No focal deficit present. Mental Status: He is alert and oriented to person, place, and time.    Psychiatric:         Mood and Affect: Mood normal.          Savanna Trejo MD  [address]

## 2023-09-11 NOTE — PATIENT INSTRUCTIONS
Wellness Visit for Adults   AMBULATORY CARE:   A wellness visit  is when you see your healthcare provider to get screened for health problems. Your healthcare provider will also give you advice on how to stay healthy. Write down your questions so you remember to ask them. Ask your healthcare provider how often you should have a wellness visit. What happens at a wellness visit:  Your healthcare provider will ask about your health, and your family history of health problems. This includes high blood pressure, heart disease, and cancer. He or she will ask if you have symptoms that concern you, if you smoke, and about your mood. You may also be asked about your intake of medicines, supplements, food, and alcohol. Any of the following may be done: Your weight  will be checked. Your height may also be checked so your body mass index (BMI) can be calculated. Your BMI shows if you are at a healthy weight. Your blood pressure  and heart rate will be checked. Your temperature may also be checked. Blood and urine tests  may be done. Blood tests may be done to check your cholesterol levels. Abnormal cholesterol levels increase your risk for heart disease and stroke. You may also need a blood or urine test to check for diabetes if you are at increased risk. Urine tests may be done to look for signs of an infection or kidney disease. A physical exam  includes checking your heartbeat and lungs with a stethoscope. Your healthcare provider may also check your skin to look for sun damage. Screening tests  may be recommended. A screening test is done to check for diseases that may not cause symptoms. The screening tests you may need depend on your age, gender, family history, and lifestyle habits. For example, colorectal screening may be recommended if you are 48years old or older. Screening tests you need if you are a woman:   A Pap smear  is used to screen for cervical cancer.  Pap smears are usually done every 3 to 5 years depending on your age. You may need them more often if you have had abnormal Pap smear test results in the past. Ask your healthcare provider how often you should have a Pap smear. A mammogram  is an x-ray of your breasts to screen for breast cancer. Experts recommend mammograms every 2 years starting at age 48 years. You may need a mammogram at age 52 years or younger if you have an increased risk for breast cancer. Talk to your healthcare provider about when you should start having mammograms and how often you need them. Vaccines you may need:   Get an influenza vaccine  every year. The influenza vaccine protects you from the flu. Several types of viruses cause the flu. The viruses change over time, so new vaccines are made each year. Get a tetanus-diphtheria (Td) booster vaccine  every 10 years. This vaccine protects you against tetanus and diphtheria. Tetanus is a severe infection that may cause painful muscle spasms and lockjaw. Diphtheria is a severe bacterial infection that causes a thick covering in the back of your mouth and throat. Get a human papillomavirus (HPV) vaccine  if you are female and aged 23 to 32 or male 23 to 24 and never received it. This vaccine protects you from HPV infection. HPV is the most common infection spread by sexual contact. HPV may also cause vaginal, penile, and anal cancers. Get a pneumococcal vaccine  if you are aged 72 years or older. The pneumococcal vaccine is an injection given to protect you from pneumococcal disease. Pneumococcal disease is an infection caused by pneumococcal bacteria. The infection may cause pneumonia, meningitis, or an ear infection. Get a shingles vaccine  if you are 60 or older, even if you have had shingles before. The shingles vaccine is an injection to protect you from the varicella-zoster virus. This is the same virus that causes chickenpox.  Shingles is a painful rash that develops in people who had chickenpox or have been exposed to the virus. How to eat healthy:  My Plate is a model for planning healthy meals. It shows the types and amounts of foods that should go on your plate. Fruits and vegetables make up about half of your plate, and grains and protein make up the other half. A serving of dairy is included on the side of your plate. The amount of calories and serving sizes you need depends on your age, gender, weight, and height. Examples of healthy foods are listed below:  Eat a variety of vegetables  such as dark green, red, and orange vegetables. You can also include canned vegetables low in sodium (salt) and frozen vegetables without added butter or sauces. Eat a variety of fresh fruits , canned fruit in 100% juice, frozen fruit, and dried fruit. Include whole grains. At least half of the grains you eat should be whole grains. Examples include whole-wheat bread, wheat pasta, brown rice, and whole-grain cereals such as oatmeal.    Eat a variety of protein foods such as seafood (fish and shellfish), lean meat, and poultry without skin (turkey and chicken). Examples of lean meats include pork leg, shoulder, or tenderloin, and beef round, sirloin, tenderloin, and extra lean ground beef. Other protein foods include eggs and egg substitutes, beans, peas, soy products, nuts, and seeds. Choose low-fat dairy products such as skim or 1% milk or low-fat yogurt, cheese, and cottage cheese. Limit unhealthy fats  such as butter, hard margarine, and shortening. Exercise:  Exercise at least 30 minutes per day on most days of the week. Some examples of exercise include walking, biking, dancing, and swimming. You can also fit in more physical activity by taking the stairs instead of the elevator or parking farther away from stores. Include muscle strengthening activities 2 days each week. Regular exercise provides many health benefits.  It helps you manage your weight, and decreases your risk for type 2 diabetes, heart disease, stroke, and high blood pressure. Exercise can also help improve your mood. Ask your healthcare provider about the best exercise plan for you. General health and safety guidelines:   Do not smoke. Nicotine and other chemicals in cigarettes and cigars can cause lung damage. Ask your healthcare provider for information if you currently smoke and need help to quit. E-cigarettes or smokeless tobacco still contain nicotine. Talk to your healthcare provider before you use these products. Limit alcohol. A drink of alcohol is 12 ounces of beer, 5 ounces of wine, or 1½ ounces of liquor. Lose weight, if needed. Being overweight increases your risk of certain health conditions. These include heart disease, high blood pressure, type 2 diabetes, and certain types of cancer. Protect your skin. Do not sunbathe or use tanning beds. Use sunscreen with a SPF 15 or higher. Apply sunscreen at least 15 minutes before you go outside. Reapply sunscreen every 2 hours. Wear protective clothing, hats, and sunglasses when you are outside. Drive safely. Always wear your seatbelt. Make sure everyone in your car wears a seatbelt. A seatbelt can save your life if you are in an accident. Do not use your cell phone when you are driving. This could distract you and cause an accident. Pull over if you need to make a call or send a text message. Practice safe sex. Use latex condoms if are sexually active and have more than one partner. Your healthcare provider may recommend screening tests for sexually transmitted infections (STIs). Wear helmets, lifejackets, and protective gear. Always wear a helmet when you ride a bike or motorcycle, go skiing, or play sports that could cause a head injury. Wear protective equipment when you play sports. Wear a lifejacket when you are on a boat or doing water sports.     © Copyright [facility] 2022 Information is for End User's use only and may not be sold, redistributed or otherwise used for commercial purposes. The above information is an  only. It is not intended as medical advice for individual conditions or treatments. Talk to your doctor, nurse or pharmacist before following any medical regimen to see if it is safe and effective for you.

## 2023-11-01 ENCOUNTER — APPOINTMENT (RX ONLY)
Dept: URBAN - METROPOLITAN AREA CLINIC 26 | Facility: CLINIC | Age: 63
Setting detail: DERMATOLOGY
End: 2023-11-01

## 2023-11-01 DIAGNOSIS — L57.0 ACTINIC KERATOSIS: ICD-10-CM

## 2023-11-01 DIAGNOSIS — D22 MELANOCYTIC NEVI: ICD-10-CM

## 2023-11-01 DIAGNOSIS — L81.4 OTHER MELANIN HYPERPIGMENTATION: ICD-10-CM

## 2023-11-01 DIAGNOSIS — L30.0 NUMMULAR DERMATITIS: ICD-10-CM

## 2023-11-01 DIAGNOSIS — Z87.2 PERSONAL HISTORY OF DISEASES OF THE SKIN AND SUBCUTANEOUS TISSUE: ICD-10-CM

## 2023-11-01 DIAGNOSIS — L82.1 OTHER SEBORRHEIC KERATOSIS: ICD-10-CM

## 2023-11-01 DIAGNOSIS — D18.0 HEMANGIOMA: ICD-10-CM

## 2023-11-01 PROBLEM — D22.5 MELANOCYTIC NEVI OF TRUNK: Status: ACTIVE | Noted: 2023-11-01

## 2023-11-01 PROBLEM — D18.01 HEMANGIOMA OF SKIN AND SUBCUTANEOUS TISSUE: Status: ACTIVE | Noted: 2023-11-01

## 2023-11-01 PROCEDURE — ? LIQUID NITROGEN

## 2023-11-01 PROCEDURE — ? SUNSCREEN RECOMMENDATIONS

## 2023-11-01 PROCEDURE — 17000 DESTRUCT PREMALG LESION: CPT

## 2023-11-01 PROCEDURE — 99213 OFFICE O/P EST LOW 20 MIN: CPT | Mod: 25

## 2023-11-01 PROCEDURE — ? ADDITIONAL NOTES

## 2023-11-01 PROCEDURE — ? FULL BODY SKIN EXAM

## 2023-11-01 PROCEDURE — ? COUNSELING

## 2023-11-01 ASSESSMENT — LOCATION ZONE DERM
LOCATION ZONE: TRUNK
LOCATION ZONE: LEG
LOCATION ZONE: NOSE

## 2023-11-01 ASSESSMENT — LOCATION SIMPLE DESCRIPTION DERM
LOCATION SIMPLE: LEFT ANKLE
LOCATION SIMPLE: UPPER BACK
LOCATION SIMPLE: RIGHT ANKLE
LOCATION SIMPLE: NOSE

## 2023-11-01 ASSESSMENT — LOCATION DETAILED DESCRIPTION DERM
LOCATION DETAILED: RIGHT POSTERIOR ANKLE
LOCATION DETAILED: NASAL DORSUM
LOCATION DETAILED: LEFT POSTERIOR ANKLE
LOCATION DETAILED: SUPERIOR THORACIC SPINE

## 2023-11-01 NOTE — PROCEDURE: LIQUID NITROGEN
Number Of Freeze-Thaw Cycles: 2 freeze-thaw cycles
Show Applicator Variable?: Yes
Duration Of Freeze Thaw-Cycle (Seconds): 0
Render Post-Care Instructions In Note?: no
Post-Care Instructions: I reviewed with the patient in detail post-care instructions. Patient is to wear sunprotection, and avoid picking at any of the treated lesions. Pt may apply Vaseline to crusted or scabbing areas.
Detail Level: Zone
Consent: The patient's consent was obtained including but not limited to risks of crusting, scabbing, blistering, scarring, darker or lighter pigmentary change, recurrence, incomplete removal and infection.

## 2023-11-01 NOTE — PROCEDURE: ADDITIONAL NOTES
Render Risk Assessment In Note?: no
Detail Level: Zone
Additional Notes: Declines rx today; will proceed with GSC regimen and call if rx desired

## 2023-11-10 PROBLEM — Z00.00 WELL ADULT EXAM: Status: RESOLVED | Noted: 2023-09-11 | Resolved: 2023-11-10

## 2023-11-20 ENCOUNTER — TELEPHONE (OUTPATIENT)
Dept: FAMILY MEDICINE CLINIC | Facility: HOSPITAL | Age: 63
End: 2023-11-20

## 2023-11-20 NOTE — TELEPHONE ENCOUNTER
Pt aware of message   asking what type of protein could he take with the BRAT diet    please advise  thanks

## 2023-11-20 NOTE — TELEPHONE ENCOUNTER
Pt left VM stating that he has been having loose stools for about a week and a half. Has not gone away and asking for suggestions on what to take and what to be eating.  Please advise

## 2023-11-20 NOTE — TELEPHONE ENCOUNTER
Pt is modifying his diet.      Has not tried any meds to stop the stool     pt is staying hydrated    imodium will take if advised   was hoping it     would  run its course    please advise  thanks

## 2023-11-29 DIAGNOSIS — R19.7 DIARRHEA, UNSPECIFIED TYPE: Primary | ICD-10-CM

## 2023-11-29 NOTE — TELEPHONE ENCOUNTER
Pt states that he has not gotten diarrhea as much, but his stools are still yellow. Has tried the [employer] which helped some. No blood in stools or fever. Diarrhea came back after eating normal on Thanksgiving, went back on BRAT diet and stools became firmer but still having that yellow color.  Please advise

## 2023-12-01 NOTE — TELEPHONE ENCOUNTER
Patient called again for Dr Maritza Madden response. His stools are like a dark mustard color/ rainer for about 3 weeks. His loose stool and gas issues are better. But the color is still concerning.      Please call patient to advise

## 2023-12-05 LAB
ALBUMIN SERPL-MCNC: 4.5 G/DL (ref 3.9–4.9)
ALBUMIN/GLOB SERPL: 2.4 {RATIO} (ref 1.2–2.2)
ALP SERPL-CCNC: 88 IU/L (ref 44–121)
ALT SERPL-CCNC: 42 IU/L (ref 0–44)
AST SERPL-CCNC: 30 IU/L (ref 0–40)
BASOPHILS # BLD AUTO: 0.1 X10E3/UL (ref 0–0.2)
BASOPHILS NFR BLD AUTO: 1 %
BILIRUB SERPL-MCNC: 0.2 MG/DL (ref 0–1.2)
BUN SERPL-MCNC: 12 MG/DL (ref 8–27)
BUN/CREAT SERPL: 14 (ref 10–24)
CALCIUM SERPL-MCNC: 9.9 MG/DL (ref 8.6–10.2)
CHLORIDE SERPL-SCNC: 103 MMOL/L (ref 96–106)
CO2 SERPL-SCNC: 23 MMOL/L (ref 20–29)
CREAT SERPL-MCNC: 0.85 MG/DL (ref 0.76–1.27)
EGFR: 98 ML/MIN/1.73
EOSINOPHIL # BLD AUTO: 0.1 X10E3/UL (ref 0–0.4)
EOSINOPHIL NFR BLD AUTO: 2 %
ERYTHROCYTE [DISTWIDTH] IN BLOOD BY AUTOMATED COUNT: 12.9 % (ref 11.6–15.4)
GLOBULIN SER-MCNC: 1.9 G/DL (ref 1.5–4.5)
GLUCOSE SERPL-MCNC: 110 MG/DL (ref 70–99)
HCT VFR BLD AUTO: 42.1 % (ref 37.5–51)
HGB BLD-MCNC: 13.3 G/DL (ref 13–17.7)
IMM GRANULOCYTES # BLD: 0 X10E3/UL (ref 0–0.1)
IMM GRANULOCYTES NFR BLD: 0 %
LYMPHOCYTES # BLD AUTO: 1.4 X10E3/UL (ref 0.7–3.1)
LYMPHOCYTES NFR BLD AUTO: 22 %
MCH RBC QN AUTO: 29.8 PG (ref 26.6–33)
MCHC RBC AUTO-ENTMCNC: 31.6 G/DL (ref 31.5–35.7)
MCV RBC AUTO: 94 FL (ref 79–97)
MONOCYTES # BLD AUTO: 0.6 X10E3/UL (ref 0.1–0.9)
MONOCYTES NFR BLD AUTO: 10 %
NEUTROPHILS # BLD AUTO: 4.3 X10E3/UL (ref 1.4–7)
NEUTROPHILS NFR BLD AUTO: 65 %
PLATELET # BLD AUTO: 213 X10E3/UL (ref 150–450)
POTASSIUM SERPL-SCNC: 5.7 MMOL/L (ref 3.5–5.2)
PROT SERPL-MCNC: 6.4 G/DL (ref 6–8.5)
RBC # BLD AUTO: 4.47 X10E6/UL (ref 4.14–5.8)
SODIUM SERPL-SCNC: 140 MMOL/L (ref 134–144)
WBC # BLD AUTO: 6.5 X10E3/UL (ref 3.4–10.8)

## 2023-12-13 ENCOUNTER — TELEPHONE (OUTPATIENT)
Dept: FAMILY MEDICINE CLINIC | Facility: HOSPITAL | Age: 63
End: 2023-12-13

## 2023-12-13 NOTE — TELEPHONE ENCOUNTER
Sara Childs said he spoke to [address] [facility] last week regarding lower GI issues - he was told to call back if not improving - not improving - he said he was eating pretty bland food and tried to eat normal on Monday but it didn't go very well - asking what he should do next?

## 2023-12-20 DIAGNOSIS — R19.7 DIARRHEA OF PRESUMED INFECTIOUS ORIGIN: Primary | ICD-10-CM

## 2023-12-22 ENCOUNTER — OFFICE VISIT (OUTPATIENT)
Age: 63
End: 2023-12-22
Payer: COMMERCIAL

## 2023-12-22 VITALS
SYSTOLIC BLOOD PRESSURE: 128 MMHG | WEIGHT: 219.8 LBS | HEIGHT: 69 IN | DIASTOLIC BLOOD PRESSURE: 78 MMHG | BODY MASS INDEX: 32.56 KG/M2

## 2023-12-22 DIAGNOSIS — R19.7 DIARRHEA OF PRESUMED INFECTIOUS ORIGIN: ICD-10-CM

## 2023-12-22 PROCEDURE — 99204 OFFICE O/P NEW MOD 45 MIN: CPT | Performed by: INTERNAL MEDICINE

## 2023-12-22 NOTE — PROGRESS NOTES
[facility] - Outpatient Consultation  Brenton Saez 63 y.o. male MRN: [id number]  Encounter: [id number]    ASSESSMENT AND PLAN:      1. Diarrhea of presumed infectious origin  While still under 8 weeks in duration and technically in an acute phase, and therefore likely infectious (workup ordered by PCP), he is not having true watery diarrhea but more so looser, multiple stools.  I will await the infectious workup, but his exam and story is more consistent with constipation with overflow.  I will check an x-ray to assess stool burden as well  - Ambulatory Referral to Gastroenterology  - XR abdomen obstruction series; Future      Follow up Appointment: Pending workup    _______________________      Chief Complaint   Patient presents with    Diarrhea     Pt has been experiencing frequent diarrhea, gas and bloating off/on for 6 weeks. Pt notes his stool has an odd yellow color. He tried a BRAT diet helped somewhat, but not resolved. No nausea or vomiting.       HPI:   Brenton Saez is a 63 y.o. year old male with a PMH significant for colon polyps presents in consultation from his PCP for 6 weeks of change in bowel habits including diarrhea.  He states that he was well until up about 6 weeks ago where he started developing loose, dark yellow-colored stools multiple times a day.  He states that he will go in the morning with a formed but soft bowel movement that is followed by a couple more episodes of looser stool.  Because of the change in color of stool, his liver tests were ordered and were normal.  He does say that the need to have a bowel movement does wake him up earlier than usual.  After his morning session of multiple bowel movements, he is usually done for the day.  He does not see blood or mucus in the stool.  He had a colonoscopy just last year for colon polyp surveillance and is not due for repeat colonoscopy for another 6 years.  He does not note any new  "medicines or travel.  He just received a collection container for the stool samples this morning and will hopefully drop it off to [facility] tomorrow.  He does also complain of bloating and increased gas as well as some fullness, but no nausea or vomiting or days without a bowel movement.    Historical Information   Past Medical History:   Diagnosis Date    Cancer (HCC)     prostate    Colon polyp     HL (hearing loss)     Murmur, heart     Obesity     Patient denies medical problems      Past Surgical History:   Procedure Laterality Date    APPENDECTOMY      COLONOSCOPY      CYST REMOVAL Right 2018    right index finger (knuckle)    HAND SURGERY      PROSTATECTOMY      TONSILLECTOMY       Social History     Substance and Sexual Activity   Alcohol Use Not Currently    Comment: 1 beer per month     Social History     Substance and Sexual Activity   Drug Use Never     Social History     Tobacco Use   Smoking Status Never   Smokeless Tobacco Never     Family History   Problem Relation Age of Onset    Diabetes Mother     Arthritis Mother     Stroke Father     Diabetes Sister        Meds/Allergies     Current Outpatient Medications:     rosuvastatin (Crestor) 5 mg tablet    tadalafil (CIALIS) 10 MG tablet    azelastine (ASTELIN) 0.1 % nasal spray    Allergies   Allergen Reactions    Penicillins Other (See Comments)     Unsure as a child       PHYSICAL EXAM:    Blood pressure 128/78, height 5' 9\" (1.753 m), weight 99.7 kg (219 lb 12.8 oz). Body mass index is 32.46 kg/m².  General Appearance: NAD, cooperative, alert  Eyes: Anicteric  GI:  Soft, non-tender, non-distended; normal bowel sounds; no masses, no organomegaly   Rectal: Deferred  Musculoskeletal: No edema.  Skin:  No jaundice    Lab Results:   Lab Results   Component Value Date    WBC 6.5 12/04/2023    HGB 13.3 12/04/2023    MCV 94 12/04/2023     12/04/2023     Lab Results   Component Value Date    K 5.7 (H) 12/04/2023     12/04/2023    CO2 23 12/04/2023 " "   BUN 12 12/04/2023    CREATININE 0.85 12/04/2023    AST 30 12/04/2023    AST 18 09/06/2023    ALT 42 12/04/2023    ALT 16 09/06/2023    EGFR 98 12/04/2023     No results found for: \"IRON\", \"TIBC\", \"FERRITIN\"  No results found for: \"LIPASE\"    Radiology Results:   No results found.  "

## 2023-12-23 LAB
ALBUMIN SERPL-MCNC: 4.3 G/DL (ref 3.9–4.9)
ALBUMIN/GLOB SERPL: 2 {RATIO} (ref 1.2–2.2)
ALP SERPL-CCNC: 79 IU/L (ref 44–121)
ALT SERPL-CCNC: 27 IU/L (ref 0–44)
AST SERPL-CCNC: 26 IU/L (ref 0–40)
BILIRUB SERPL-MCNC: 0.3 MG/DL (ref 0–1.2)
BUN SERPL-MCNC: 12 MG/DL (ref 8–27)
BUN/CREAT SERPL: 13 (ref 10–24)
CALCIUM SERPL-MCNC: 9.2 MG/DL (ref 8.6–10.2)
CHLORIDE SERPL-SCNC: 105 MMOL/L (ref 96–106)
CHOLEST SERPL-MCNC: 130 MG/DL (ref 100–199)
CO2 SERPL-SCNC: 23 MMOL/L (ref 20–29)
CREAT SERPL-MCNC: 0.96 MG/DL (ref 0.76–1.27)
EGFR: 89 ML/MIN/1.73
EST. AVERAGE GLUCOSE BLD GHB EST-MCNC: 137 MG/DL
GLOBULIN SER-MCNC: 2.1 G/DL (ref 1.5–4.5)
GLUCOSE SERPL-MCNC: 100 MG/DL (ref 70–99)
HBA1C MFR BLD: 6.4 % (ref 4.8–5.6)
HDLC SERPL-MCNC: 54 MG/DL
LDLC SERPL CALC-MCNC: 61 MG/DL (ref 0–99)
LDLC/HDLC SERPL: 1.1 RATIO (ref 0–3.6)
POTASSIUM SERPL-SCNC: 4.9 MMOL/L (ref 3.5–5.2)
PROT SERPL-MCNC: 6.4 G/DL (ref 6–8.5)
SL AMB VLDL CHOLESTEROL CALC: 15 MG/DL (ref 5–40)
SODIUM SERPL-SCNC: 141 MMOL/L (ref 134–144)
TRIGL SERPL-MCNC: 76 MG/DL (ref 0–149)

## 2023-12-26 ENCOUNTER — NURSE TRIAGE (OUTPATIENT)
Age: 63
End: 2023-12-26

## 2023-12-26 NOTE — TELEPHONE ENCOUNTER
"Pt requesting X-ray results and recommendations. I advised [employer] message forwarded to provider this morning. Pt can be reached at [phone]. Ok to leave detailed message.    Reason for Disposition  • Information only question and nurse able to answer    Answer Assessment - Initial Assessment Questions  1. REASON FOR CALL or QUESTION: \"What is your reason for calling today?\" or \"How can I best help you?\" or \"What question do you have that I can help answer?\"      Pt requesting X-ray results and recommendations.    Protocols used: Information Only Call - No Triage-ADULT-[place]    "

## 2023-12-27 NOTE — TELEPHONE ENCOUNTER
Spoke to pt   he will call back tomorrow to schedule  he didn't have his calendar in front of him  pQ

## 2023-12-27 NOTE — TELEPHONE ENCOUNTER
Patient calling in with concerns of his xray results.  Patient has been trying to get a hold of Dr. Zaragoza or someone regarding this results and is waiting on a call back.

## 2023-12-27 NOTE — TELEPHONE ENCOUNTER
Responding to message from call center and [employer] messages.  Called patient to review results of abdominal x-ray which was completed 12/22 and showed heavy stool burden and gas at ascending colon and splenic flexure, heavy stool burden throughout  Suspect patient is experiencing overflow diarrhea due to severe constipation  He does report that he is having formed and loose bowel movements daily.  Some abdominal discomfort  Has been following bland diet and decreased fluid intake  Discussed high-fiber diet-recommend 5 servings of fruits and vegetables daily.  Drink at least 64 ounces of water daily    Recommend bowel prep with MiraLAX 8 ounces mixed in 32 ounces of Gatorade and drink over 1 to 2 hours  Once he has completed bowel prep, recommend he continue taking MiraLAX 1 capful twice daily daily  Encouraged him to call the office with any problems and will send message to schedulers to set up follow-up office visit

## 2024-01-01 LAB
ADV 40+41 DNA STL QL NAA+NON-PROBE: NOT DETECTED
ASTRO TYP 1-8 RNA STL QL NAA+NON-PROBE: NOT DETECTED
C CAYETANENSIS DNA STL QL NAA+NON-PROBE: NOT DETECTED
C COLI+JEJ+UPSA DNA STL QL NAA+NON-PROBE: NOT DETECTED
C DIF TOX TCDA+TCDB STL QL NAA+NON-PROBE: NOT DETECTED
CRYPTOSP DNA STL QL NAA+NON-PROBE: NOT DETECTED
E COLI O157 DNA STL QL NAA+NON-PROBE: NORMAL
E HISTOLYT DNA STL QL NAA+NON-PROBE: NOT DETECTED
EAEC PAA PLAS AGGR+AATA ST NAA+NON-PRB: NOT DETECTED
EC STX1+STX2 GENES STL QL NAA+NON-PROBE: NOT DETECTED
EPEC EAE GENE STL QL NAA+NON-PROBE: NOT DETECTED
ETEC LTA+ST1A+ST1B TOX ST NAA+NON-PROBE: NOT DETECTED
G LAMBLIA DNA STL QL NAA+NON-PROBE: NOT DETECTED
NOROVIRUS GI+II RNA STL QL NAA+NON-PROBE: NOT DETECTED
P SHIGELLOIDES DNA STL QL NAA+NON-PROBE: NOT DETECTED
RVA RNA STL QL NAA+NON-PROBE: NOT DETECTED
S ENT+BONG DNA STL QL NAA+NON-PROBE: NOT DETECTED
SAPO I+II+IV+V RNA STL QL NAA+NON-PROBE: NOT DETECTED
SHIGELLA SP+EIEC IPAH ST NAA+NON-PROBE: NOT DETECTED
V CHOL+PARA+VUL DNA STL QL NAA+NON-PROBE: NOT DETECTED
V CHOLERAE DNA STL QL NAA+NON-PROBE: NOT DETECTED
Y ENTEROCOL DNA STL QL NAA+NON-PROBE: NOT DETECTED

## 2024-01-23 DIAGNOSIS — D53.9 ANEMIA ASSOCIATED WITH NUTRITIONAL DEFICIENCY: Primary | ICD-10-CM

## 2024-01-26 DIAGNOSIS — D50.8 OTHER IRON DEFICIENCY ANEMIA: Primary | ICD-10-CM

## 2024-01-26 LAB
BASOPHILS # BLD AUTO: 0 X10E3/UL (ref 0–0.2)
BASOPHILS NFR BLD AUTO: 1 %
EOSINOPHIL # BLD AUTO: 0.1 X10E3/UL (ref 0–0.4)
EOSINOPHIL NFR BLD AUTO: 2 %
ERYTHROCYTE [DISTWIDTH] IN BLOOD BY AUTOMATED COUNT: 13.1 % (ref 11.6–15.4)
HCT VFR BLD AUTO: 38.6 % (ref 37.5–51)
HGB BLD-MCNC: 12.5 G/DL (ref 13–17.7)
IMM GRANULOCYTES # BLD: 0 X10E3/UL (ref 0–0.1)
IMM GRANULOCYTES NFR BLD: 0 %
IRON SERPL-MCNC: 48 UG/DL (ref 38–169)
LYMPHOCYTES # BLD AUTO: 1.3 X10E3/UL (ref 0.7–3.1)
LYMPHOCYTES NFR BLD AUTO: 23 %
MCH RBC QN AUTO: 29.3 PG (ref 26.6–33)
MCHC RBC AUTO-ENTMCNC: 32.4 G/DL (ref 31.5–35.7)
MCV RBC AUTO: 91 FL (ref 79–97)
MONOCYTES # BLD AUTO: 0.5 X10E3/UL (ref 0.1–0.9)
MONOCYTES NFR BLD AUTO: 10 %
NEUTROPHILS # BLD AUTO: 3.6 X10E3/UL (ref 1.4–7)
NEUTROPHILS NFR BLD AUTO: 64 %
PLATELET # BLD AUTO: 221 X10E3/UL (ref 150–450)
RBC # BLD AUTO: 4.26 X10E6/UL (ref 4.14–5.8)
VIT B12 SERPL-MCNC: >2000 PG/ML (ref 232–1245)
WBC # BLD AUTO: 5.7 X10E3/UL (ref 3.4–10.8)

## 2024-02-06 RX ORDER — FERROUS SULFATE 325(65) MG
TABLET ORAL 2 TIMES DAILY WITH MEALS
COMMUNITY
Start: 2024-01-24

## 2024-02-07 ENCOUNTER — OFFICE VISIT (OUTPATIENT)
Age: 64
End: 2024-02-07
Payer: COMMERCIAL

## 2024-02-07 VITALS
SYSTOLIC BLOOD PRESSURE: 114 MMHG | WEIGHT: 209.6 LBS | HEIGHT: 69 IN | BODY MASS INDEX: 31.04 KG/M2 | DIASTOLIC BLOOD PRESSURE: 64 MMHG

## 2024-02-07 DIAGNOSIS — D64.9 ANEMIA, UNSPECIFIED TYPE: ICD-10-CM

## 2024-02-07 DIAGNOSIS — K59.09 CHRONIC CONSTIPATION WITH OVERFLOW: Primary | ICD-10-CM

## 2024-02-07 PROCEDURE — 99213 OFFICE O/P EST LOW 20 MIN: CPT | Performed by: INTERNAL MEDICINE

## 2024-02-07 NOTE — PROGRESS NOTES
[facility] - Outpatient Follow-up Note  Brenton Saez 63 y.o. male MRN: [id number]  Encounter: [id number]    ASSESSMENT AND PLAN:      1. Chronic constipation with overflow  Resolved.  He is now off MiraLAX and his bowel habits are back to baseline.  He is due for repeat colonoscopy in 2029    2. Anemia, unspecified type  Not consistent with iron deficiency, as his iron level was normal.  He has no signs of GI bleeding.  Will defer to PCP      Follow up appointment: As needed    _______________________      Chief Complaint   Patient presents with    Follow-up     Pt states he is doing much better, diarrhea has resolved.       HPI:   Patient is a 63 y.o. male with a significant PMH of colon polyps presenting for follow up regarding his recent episode of constipation with overflow diarrhea.  After taking MiraLAX, he improved quickly and feels much better.  He has now been off MiraLAX for over a month and his bowel habits are back to normal.  He did start oral iron supplementation by his PCP due to a recent low hemoglobin, however his iron stores were normal.  While the iron is not constipating him it is change in the color of his stool.    Historical Information   Past Medical History:   Diagnosis Date    Cancer (HCC)     prostate    Colon polyp     HL (hearing loss)     Murmur, heart     Obesity     Patient denies medical problems      Past Surgical History:   Procedure Laterality Date    APPENDECTOMY      COLONOSCOPY      CYST REMOVAL Right 2018    right index finger (knuckle)    HAND SURGERY      PROSTATECTOMY      TONSILLECTOMY       Social History     Substance and Sexual Activity   Alcohol Use Not Currently    Comment: 1 beer per month     Social History     Substance and Sexual Activity   Drug Use Never     Social History     Tobacco Use   Smoking Status Never    Passive exposure: Never   Smokeless Tobacco Never     Family History   Problem Relation Age of Onset     "Diabetes Mother     Arthritis Mother     Stroke Father     Diabetes Sister          Current Outpatient Medications:     ferrous sulfate 325 (65 Fe) mg tablet    rosuvastatin (Crestor) 5 mg tablet    tadalafil (CIALIS) 10 MG tablet    azelastine (ASTELIN) 0.1 % nasal spray  Allergies   Allergen Reactions    Penicillins Other (See Comments)     Unsure as a child     Reviewed medications and allergies and updated as indicated    PHYSICAL EXAM:    Blood pressure 114/64, height 5' 9\" (1.753 m), weight 95.1 kg (209 lb 9.6 oz). Body mass index is 30.95 kg/m².  General Appearance: NAD, cooperative, alert  Eyes: Anicteric  GI:  Soft, non-tender, non-distended; normal bowel sounds; no masses, no organomegaly   Rectal: Deferred  Musculoskeletal: No edema.  Skin:  No jaundice    Lab Results:   Lab Results   Component Value Date    WBC 5.7 01/25/2024    WBC 6.5 12/04/2023    HGB 12.5 (L) 01/25/2024    HGB 13.3 12/04/2023    MCV 91 01/25/2024     01/25/2024     12/04/2023     Lab Results   Component Value Date    K 4.9 12/22/2023     12/22/2023    CO2 23 12/22/2023    BUN 12 12/22/2023    CREATININE 0.96 12/22/2023    AST 26 12/22/2023    AST 30 12/04/2023    AST 18 09/06/2023    ALT 27 12/22/2023    ALT 42 12/04/2023    ALT 16 09/06/2023    EGFR 89 12/22/2023     Lab Results   Component Value Date    IRON 48 01/25/2024     No results found for: \"LIPASE\"    Radiology Results:   No results found.  "

## 2024-02-21 ENCOUNTER — NURSE TRIAGE (OUTPATIENT)
Age: 64
End: 2024-02-21

## 2024-02-21 NOTE — TELEPHONE ENCOUNTER
Spoke with patient, reviewed provider recommendations. Patient verbalized understanding, no further questions.    I scheduled appt for 2 weeks.

## 2024-02-21 NOTE — TELEPHONE ENCOUNTER
"Please advise  Last OV: 2/7/24   Hx: chronic constipation with overflow, anemia     Pt calling in, reports he was sending [employer] message and advised to call in.    He is experiencing what he think may be another bowel blockage. Last time this happened was 12/22/23 at OV was experiencing over flow diarrhea- x ray confirmed increased stool burden. Pt was doing well and off miralax.   Within the last 5-10 days having bloating, gas, 3 -4 loose stools a day and RUQ abdominal cramping/ bloating feeling.     Pt is on high fiber diet, drinks plenty of fluids-80 oz a day.     I did explain to pt he should use miralax PRN to avoid over flow diarrhea and titrate this based on symptoms- he understood.     Please advise if x ray should be done again or if miralax bowel prep should be done as he did this to relieve symptoms on 1/3/24       Reason for Disposition   Constipation is a recurrent ongoing problem (i.e., < 3 BMs / week or straining > 25% of the time)    Answer Assessment - Initial Assessment Questions  1. STOOL PATTERN OR FREQUENCY: \"How often do you pass bowel movements (BMs)?\"  (Normal range: tid to q 3 days)  \"When was the last BM passed?\"        Over flow diarrhea   2. STRAINING: \"Do you have to strain to have a BM?\"       no  3. RECTAL PAIN: \"Does your rectum hurt when the stool comes out?\" If Yes, ask: \"Do you have hemorrhoids? How bad is the pain?\"  (Scale 1-10; or mild, moderate, severe)      no  4. STOOL COMPOSITION: \"Are the stools hard?\"       No   5. BLOOD ON STOOLS: \"Has there been any blood on the toilet tissue or on the surface of the BM?\" If Yes, ask: \"When was the last time?\"       No   6. CHRONIC CONSTIPATION: \"Is this a new problem for you?\"  If no, ask: \"How long have you had this problem?\" (days, weeks, months)       Yes   7. CHANGES IN DIET OR HYDRATION: \"Have there been any recent changes in your diet?\" \"How much fluids are you drinking consuming on a daily basis?\"  \"How much have you had to " "drink today?\"      No   8. MEDICATIONS: \"Have you been taking any new medications?\" \"Are you taking any narcotic pain medications?\" (e.g., Vicoden, Percocet, morphine, dilaudid)      No   9. LAXATIVES: \"Have you been using any stool softeners, laxatives, or enemas?\"  If yes, ask \"What, how often, and when was the last time?\"  No   10.ACTIVITY:  \"How much walking do you do every day? on a daily basis?\"  \"Has your activity level decreased in the past week?\"         No   11. CAUSE: \"What do you think is causing the constipation?\"         Unknown   12. OTHER SYMPTOMS: \"Do you have any other symptoms?\" (e.g., abdominal pain, bloating, fever, vomiting)  Abd bloating    Protocols used: Constipation-ADULT-[place]    "

## 2024-02-27 ENCOUNTER — OFFICE VISIT (OUTPATIENT)
Age: 64
End: 2024-02-27
Payer: COMMERCIAL

## 2024-02-27 VITALS
SYSTOLIC BLOOD PRESSURE: 108 MMHG | DIASTOLIC BLOOD PRESSURE: 64 MMHG | HEIGHT: 70 IN | BODY MASS INDEX: 29.41 KG/M2 | WEIGHT: 205.4 LBS

## 2024-02-27 DIAGNOSIS — D64.9 ANEMIA, UNSPECIFIED TYPE: ICD-10-CM

## 2024-02-27 DIAGNOSIS — K59.09 CHRONIC CONSTIPATION WITH OVERFLOW: Primary | ICD-10-CM

## 2024-02-27 PROCEDURE — 99214 OFFICE O/P EST MOD 30 MIN: CPT | Performed by: INTERNAL MEDICINE

## 2024-02-27 RX ORDER — UBIDECARENONE 30 MG
CAPSULE ORAL
COMMUNITY

## 2024-02-27 NOTE — PROGRESS NOTES
[facility] - Outpatient Consultation  Brenton Saez 63 y.o. male MRN: [id number]  Encounter: [id number]    ASSESSMENT AND PLAN:      1. Chronic constipation with overflow  I had diagnosed and fix this issue with MiraLAX, and I did suspect that this may return with the initiation of oral iron therapy    2. Anemia, unspecified type  He already has an order for CBC and iron panel, which she will get done now.  If the iron is normal, he can stop the supplement, and his bowel habits will hopefully return to normal      Follow up Appointment: As needed    _______________________      Chief Complaint   Patient presents with    Lower gi issues     Loose stools, cramping, oily stools-grease on surface of water after bm       HPI:   Brenton Saez is a 63 y.o. year old male with a PMH significant for constipation with overflow who presents as a follow-up to continued issues with his bowels.  At last visit, his bowels returned to normal upon initiation of MiraLAX, however he was started on oral iron supplementation.  He does have a normocytic slight anemia and a normal iron level.  He has repeat labs ordered for now to be done including a CBC and a full iron panel.  He states that he is now needing multiple episodes of bowel movements across a 3-hour period each day to feel evacuated.  Some bowel movements he described as an oil slick.  They have turned darker since starting iron.  He does go every day.  He does not see blood or mucus in his stool.  Upon reinitiation of MiraLAX, his bowels did return to solid bowel movements.  He is currently off of it.  He is also endorsing a right lower quadrant discomfort.    Historical Information   Past Medical History:   Diagnosis Date    Cancer (HCC)     prostate    Colon polyp     HL (hearing loss)     Murmur, heart     Obesity     Patient denies medical problems      Past Surgical History:   Procedure Laterality Date    APPENDECTOMY       "COLONOSCOPY      CYST REMOVAL Right 2018    right index finger (knuckle)    HAND SURGERY      PROSTATECTOMY      TONSILLECTOMY       Social History     Substance and Sexual Activity   Alcohol Use Not Currently    Comment: 1 beer per month     Social History     Substance and Sexual Activity   Drug Use Never     Social History     Tobacco Use   Smoking Status Never    Passive exposure: Never   Smokeless Tobacco Never     Family History   Problem Relation Age of Onset    Diabetes Mother     Arthritis Mother     Stroke Father     Diabetes Sister        Meds/Allergies     Current Outpatient Medications:     ferrous sulfate 325 (65 Fe) mg tablet    Multiple Vitamins-Minerals (Multi For Him 50+) TABS    rosuvastatin (Crestor) 5 mg tablet    tadalafil (CIALIS) 10 MG tablet    azelastine (ASTELIN) 0.1 % nasal spray    Allergies   Allergen Reactions    Penicillins Other (See Comments)     Unsure as a child       PHYSICAL EXAM:    Blood pressure 108/64, height 5' 9.5\" (1.765 m), weight 93.2 kg (205 lb 6.4 oz). Body mass index is 29.9 kg/m².  General Appearance: NAD, cooperative, alert  Eyes: Anicteric  GI:  Soft, non-tender, non-distended; normal bowel sounds; no masses, no organomegaly   Rectal: Deferred  Musculoskeletal: No edema.  Skin:  No jaundice    Lab Results:   Lab Results   Component Value Date    WBC 5.7 01/25/2024    WBC 6.5 12/04/2023    HGB 12.5 (L) 01/25/2024    HGB 13.3 12/04/2023    MCV 91 01/25/2024     01/25/2024     12/04/2023     Lab Results   Component Value Date    K 4.9 12/22/2023     12/22/2023    CO2 23 12/22/2023    BUN 12 12/22/2023    CREATININE 0.96 12/22/2023    AST 26 12/22/2023    AST 30 12/04/2023    AST 18 09/06/2023    ALT 27 12/22/2023    ALT 42 12/04/2023    ALT 16 09/06/2023    EGFR 89 12/22/2023     Lab Results   Component Value Date    IRON 48 01/25/2024     No results found for: \"LIPASE\"    Radiology Results:   No results found.  "

## 2024-03-01 LAB
BASOPHILS # BLD AUTO: 0.1 X10E3/UL (ref 0–0.2)
BASOPHILS NFR BLD AUTO: 1 %
EOSINOPHIL # BLD AUTO: 0.2 X10E3/UL (ref 0–0.4)
EOSINOPHIL NFR BLD AUTO: 3 %
ERYTHROCYTE [DISTWIDTH] IN BLOOD BY AUTOMATED COUNT: 13 % (ref 11.6–15.4)
HCT VFR BLD AUTO: 39.2 % (ref 37.5–51)
HGB BLD-MCNC: 12.5 G/DL (ref 13–17.7)
IMM GRANULOCYTES # BLD: 0 X10E3/UL (ref 0–0.1)
IMM GRANULOCYTES NFR BLD: 0 %
LYMPHOCYTES # BLD AUTO: 1.1 X10E3/UL (ref 0.7–3.1)
LYMPHOCYTES NFR BLD AUTO: 22 %
MCH RBC QN AUTO: 29.6 PG (ref 26.6–33)
MCHC RBC AUTO-ENTMCNC: 31.9 G/DL (ref 31.5–35.7)
MCV RBC AUTO: 93 FL (ref 79–97)
MONOCYTES # BLD AUTO: 0.5 X10E3/UL (ref 0.1–0.9)
MONOCYTES NFR BLD AUTO: 11 %
NEUTROPHILS # BLD AUTO: 3.1 X10E3/UL (ref 1.4–7)
NEUTROPHILS NFR BLD AUTO: 63 %
PLATELET # BLD AUTO: 193 X10E3/UL (ref 150–450)
RBC # BLD AUTO: 4.23 X10E6/UL (ref 4.14–5.8)
WBC # BLD AUTO: 4.9 X10E3/UL (ref 3.4–10.8)

## 2024-03-04 ENCOUNTER — TELEPHONE (OUTPATIENT)
Age: 64
End: 2024-03-04

## 2024-03-05 DIAGNOSIS — D50.8 OTHER IRON DEFICIENCY ANEMIA: Primary | ICD-10-CM

## 2024-03-10 ENCOUNTER — HOSPITAL ENCOUNTER (INPATIENT)
Facility: HOSPITAL | Age: 64
LOS: 4 days | Discharge: HOME | DRG: 722 | End: 2024-03-14
Attending: INTERNAL MEDICINE | Admitting: INTERNAL MEDICINE
Payer: COMMERCIAL

## 2024-03-10 ENCOUNTER — HOSPITAL ENCOUNTER (EMERGENCY)
Facility: HOSPITAL | Age: 64
End: 2024-03-10
Attending: EMERGENCY MEDICINE
Payer: COMMERCIAL

## 2024-03-10 ENCOUNTER — APPOINTMENT (EMERGENCY)
Dept: CT IMAGING | Facility: HOSPITAL | Age: 64
End: 2024-03-10
Payer: COMMERCIAL

## 2024-03-10 ENCOUNTER — NURSE TRIAGE (OUTPATIENT)
Dept: OTHER | Facility: OTHER | Age: 64
End: 2024-03-10

## 2024-03-10 VITALS
SYSTOLIC BLOOD PRESSURE: 148 MMHG | DIASTOLIC BLOOD PRESSURE: 84 MMHG | WEIGHT: 205 LBS | BODY MASS INDEX: 29.35 KG/M2 | OXYGEN SATURATION: 99 % | HEIGHT: 70 IN | TEMPERATURE: 97.9 F | HEART RATE: 73 BPM | RESPIRATION RATE: 18 BRPM

## 2024-03-10 DIAGNOSIS — K86.89 MASS OF HEAD OF PANCREAS: ICD-10-CM

## 2024-03-10 DIAGNOSIS — R79.89 ELEVATED LFTS: Primary | ICD-10-CM

## 2024-03-10 DIAGNOSIS — R10.9 ABDOMINAL PAIN: ICD-10-CM

## 2024-03-10 DIAGNOSIS — R17 JAUNDICE: ICD-10-CM

## 2024-03-10 DIAGNOSIS — R74.01 TRANSAMINITIS: ICD-10-CM

## 2024-03-10 DIAGNOSIS — K86.89 PANCREATIC MASS: Primary | ICD-10-CM

## 2024-03-10 PROBLEM — K59.00 CONSTIPATION: Status: ACTIVE | Noted: 2024-03-10

## 2024-03-10 LAB
ALBUMIN SERPL BCP-MCNC: 4.2 G/DL (ref 3.5–5)
ALP SERPL-CCNC: 416 U/L (ref 34–104)
ALT SERPL W P-5'-P-CCNC: 1060 U/L (ref 7–52)
ANION GAP SERPL CALCULATED.3IONS-SCNC: 6 MMOL/L
APTT PPP: 25 SECONDS (ref 23–37)
AST SERPL W P-5'-P-CCNC: 491 U/L (ref 13–39)
BASOPHILS # BLD AUTO: 0.06 THOUSANDS/ÂΜL (ref 0–0.1)
BASOPHILS NFR BLD AUTO: 1 % (ref 0–1)
BILIRUB DIRECT SERPL-MCNC: 4.02 MG/DL (ref 0–0.2)
BILIRUB SERPL-MCNC: 5.96 MG/DL (ref 0.2–1)
BILIRUB UR QL STRIP: ABNORMAL
BUN SERPL-MCNC: 13 MG/DL (ref 5–25)
CALCIUM SERPL-MCNC: 9.5 MG/DL (ref 8.4–10.2)
CHLORIDE SERPL-SCNC: 103 MMOL/L (ref 96–108)
CLARITY UR: CLEAR
CO2 SERPL-SCNC: 31 MMOL/L (ref 21–32)
COLOR UR: YELLOW
CREAT SERPL-MCNC: 1.01 MG/DL (ref 0.6–1.3)
EOSINOPHIL # BLD AUTO: 0.24 THOUSAND/ÂΜL (ref 0–0.61)
EOSINOPHIL NFR BLD AUTO: 4 % (ref 0–6)
ERYTHROCYTE [DISTWIDTH] IN BLOOD BY AUTOMATED COUNT: 15 % (ref 11.6–15.1)
GFR SERPL CREATININE-BSD FRML MDRD: 79 ML/MIN/1.73SQ M
GLUCOSE SERPL-MCNC: 131 MG/DL (ref 65–140)
GLUCOSE UR STRIP-MCNC: NEGATIVE MG/DL
HCT VFR BLD AUTO: 39.9 % (ref 36.5–49.3)
HGB BLD-MCNC: 13 G/DL (ref 12–17)
HGB UR QL STRIP.AUTO: NEGATIVE
IMM GRANULOCYTES # BLD AUTO: 0.02 THOUSAND/UL (ref 0–0.2)
IMM GRANULOCYTES NFR BLD AUTO: 0 % (ref 0–2)
INR PPP: 0.88 (ref 0.84–1.19)
KETONES UR STRIP-MCNC: NEGATIVE MG/DL
LEUKOCYTE ESTERASE UR QL STRIP: NEGATIVE
LIPASE SERPL-CCNC: 8 U/L (ref 11–82)
LYMPHOCYTES # BLD AUTO: 1.27 THOUSANDS/ÂΜL (ref 0.6–4.47)
LYMPHOCYTES NFR BLD AUTO: 21 % (ref 14–44)
MCH RBC QN AUTO: 30.2 PG (ref 26.8–34.3)
MCHC RBC AUTO-ENTMCNC: 32.6 G/DL (ref 31.4–37.4)
MCV RBC AUTO: 93 FL (ref 82–98)
MONOCYTES # BLD AUTO: 0.66 THOUSAND/ÂΜL (ref 0.17–1.22)
MONOCYTES NFR BLD AUTO: 11 % (ref 4–12)
NEUTROPHILS # BLD AUTO: 3.87 THOUSANDS/ÂΜL (ref 1.85–7.62)
NEUTS SEG NFR BLD AUTO: 63 % (ref 43–75)
NITRITE UR QL STRIP: NEGATIVE
NRBC BLD AUTO-RTO: 0 /100 WBCS
PH UR STRIP.AUTO: 7 [PH]
PLATELET # BLD AUTO: 180 THOUSANDS/UL (ref 149–390)
PMV BLD AUTO: 12.8 FL (ref 8.9–12.7)
POTASSIUM SERPL-SCNC: 4 MMOL/L (ref 3.5–5.3)
PROT SERPL-MCNC: 6.8 G/DL (ref 6.4–8.4)
PROT UR STRIP-MCNC: NEGATIVE MG/DL
PROTHROMBIN TIME: 12.3 SECONDS (ref 11.6–14.5)
RBC # BLD AUTO: 4.3 MILLION/UL (ref 3.88–5.62)
SODIUM SERPL-SCNC: 140 MMOL/L (ref 135–147)
SP GR UR STRIP.AUTO: <1.005 (ref 1–1.03)
UROBILINOGEN UR STRIP-ACNC: <2 MG/DL
WBC # BLD AUTO: 6.12 THOUSAND/UL (ref 4.31–10.16)

## 2024-03-10 PROCEDURE — 85610 PROTHROMBIN TIME: CPT | Performed by: PHYSICIAN ASSISTANT

## 2024-03-10 PROCEDURE — 99223 1ST HOSP IP/OBS HIGH 75: CPT | Performed by: INTERNAL MEDICINE

## 2024-03-10 PROCEDURE — 81003 URINALYSIS AUTO W/O SCOPE: CPT | Performed by: PHYSICIAN ASSISTANT

## 2024-03-10 PROCEDURE — 99285 EMERGENCY DEPT VISIT HI MDM: CPT | Performed by: EMERGENCY MEDICINE

## 2024-03-10 PROCEDURE — 80053 COMPREHEN METABOLIC PANEL: CPT | Performed by: PHYSICIAN ASSISTANT

## 2024-03-10 PROCEDURE — 99284 EMERGENCY DEPT VISIT MOD MDM: CPT

## 2024-03-10 PROCEDURE — 85025 COMPLETE CBC W/AUTO DIFF WBC: CPT | Performed by: PHYSICIAN ASSISTANT

## 2024-03-10 PROCEDURE — 83036 HEMOGLOBIN GLYCOSYLATED A1C: CPT | Performed by: INTERNAL MEDICINE

## 2024-03-10 PROCEDURE — 36415 COLL VENOUS BLD VENIPUNCTURE: CPT | Performed by: PHYSICIAN ASSISTANT

## 2024-03-10 PROCEDURE — 80074 ACUTE HEPATITIS PANEL: CPT | Performed by: EMERGENCY MEDICINE

## 2024-03-10 PROCEDURE — 82248 BILIRUBIN DIRECT: CPT | Performed by: EMERGENCY MEDICINE

## 2024-03-10 PROCEDURE — 83690 ASSAY OF LIPASE: CPT | Performed by: PHYSICIAN ASSISTANT

## 2024-03-10 PROCEDURE — 85730 THROMBOPLASTIN TIME PARTIAL: CPT | Performed by: PHYSICIAN ASSISTANT

## 2024-03-10 PROCEDURE — 74177 CT ABD & PELVIS W/CONTRAST: CPT

## 2024-03-10 RX ORDER — ACETAMINOPHEN 325 MG/1
650 TABLET ORAL EVERY 6 HOURS PRN
Status: DISCONTINUED | OUTPATIENT
Start: 2024-03-10 | End: 2024-03-14 | Stop reason: HOSPADM

## 2024-03-10 RX ORDER — POLYETHYLENE GLYCOL 3350 17 G/17G
17 POWDER, FOR SOLUTION ORAL DAILY
Status: DISCONTINUED | OUTPATIENT
Start: 2024-03-11 | End: 2024-03-10

## 2024-03-10 RX ORDER — POLYETHYLENE GLYCOL 3350 17 G/17G
17 POWDER, FOR SOLUTION ORAL DAILY PRN
Status: DISCONTINUED | OUTPATIENT
Start: 2024-03-10 | End: 2024-03-11

## 2024-03-10 RX ORDER — FERROUS SULFATE 325(65) MG
325 TABLET ORAL
Status: DISCONTINUED | OUTPATIENT
Start: 2024-03-11 | End: 2024-03-10

## 2024-03-10 RX ORDER — AMOXICILLIN 250 MG
2 CAPSULE ORAL
Status: DISCONTINUED | OUTPATIENT
Start: 2024-03-11 | End: 2024-03-10

## 2024-03-10 RX ADMIN — IOHEXOL 100 ML: 350 INJECTION, SOLUTION INTRAVENOUS at 16:41

## 2024-03-10 NOTE — TELEPHONE ENCOUNTER
"Patient is calling due to urine very dark orange color. Patients wife also thinks his neck and part of his face looks jaundice, denies any changes to whites of his eyes. Has been dealing with GI issues constipation and alternating diarrhea with occasional mucous in stool. Was on iron supplement until it was d/c a week ago. Has been having abdominal cramping on his right side between rib and hip that occasionally radiates to his left. Pain is mild but worsening over time. No new medications, patient is hydrated. No other changes in urination. Increased hydration has not improved urine color.     Per on-call provider, please go to ER for evaluation, bloodwork, and imaging.    Reason for Disposition  • White of the eyes have turned yellow (i.e., jaundice)    Answer Assessment - Initial Assessment Questions  1. LOCATION: \"Where does it hurt?\"       Lower Right side of abdomen between rib and hip sometimes radiates to left. Sore muscle feeling.  2. RADIATION: \"Does the pain shoot anywhere else?\" (e.g., chest, back)      Occasionally radiates to left side  3. ONSET: \"When did the pain begin?\" (Minutes, hours or days ago)       Ongoing and worsening  4. SUDDEN: \"Gradual or sudden onset?\"      Gradual worsening over last 1-2 weeks.  5. PATTERN \"Does the pain come and go, or is it constant?\"     - If constant: \"Is it getting better, staying the same, or worsening?\"       (Note: Constant means the pain never goes away completely; most serious pain is constant and it progresses)      - If intermittent: \"How long does it last?\" \"Do you have pain now?\"      (Note: Intermittent means the pain goes away completely between bouts)      Pain intermittent  6. SEVERITY: \"How bad is the pain?\"  (e.g., Scale 1-10; mild, moderate, or severe)     - MILD (1-3): doesn't interfere with normal activities, abdomen soft and not tender to touch      - MODERATE (4-7): interferes with normal activities or awakens from sleep, tender to touch      - " "SEVERE (8-10): excruciating pain, doubled over, unable to do any normal activities        Mild pain   7. RECURRENT SYMPTOM: \"Have you ever had this type of stomach pain before?\" If Yes, ask: \"When was the last time?\" and \"What happened that time?\"       Yes over last 2 weeks  8. CAUSE: \"What do you think is causing the stomach pain?\"     Unknown  9. RELIEVING/AGGRAVATING FACTORS: \"What makes it better or worse?\" (e.g., movement, antacids, bowel movement)      Unknown  10. OTHER SYMPTOMS: \"Has there been any vomiting, diarrhea, constipation, or urine problems?\"       Constipation and diarrhea alternating, mucousy stool occasionally. . Urine dark orange color.     Iron stopped a week ago    Protocols used: Abdominal Pain - Male-ADULT-    "

## 2024-03-10 NOTE — ED PROVIDER NOTES
History  Chief Complaint   Patient presents with    Abdominal Pain     Pt presents to the ED with abdominal pain with known GI issues. Pt states pain is in 2 specific spots in upper L and R quad. States urine seems a little darker than usual and his wife felt that he was a little jaundiced looking.      Patient is a 63 year old male who presents with abdominal pain.  Reports that he has been struggling with abdominal pain for months and has been following with a gastroenterologist, but within the last 3 days the abdominal pain has changed and worsened.  He states that it is in the right upper abdomen radiating to the left upper and lower abdomen, constant with waxing and waning, associated with intermittent constipation and diarrhea but more so constipation with stools that are oily and float.  He states that additionally within the last 3 days his urine seems to have become incredibly dark and today his wife noticed that he looked yellow.            Prior to Admission Medications   Prescriptions Last Dose Informant Patient Reported? Taking?   Multiple Vitamins-Minerals (Multi For Him 50+) TABS   Yes No   Sig: Take by mouth   azelastine (ASTELIN) 0.1 % nasal spray  Self Yes No   Si sprays into each nostril in the morning Use in each nostril as directed    ONLY WHEN NEEDED   ferrous sulfate 325 (65 Fe) mg tablet  Self Yes No   Si (two) times a day with meals   rosuvastatin (Crestor) 5 mg tablet  Self Yes No   tadalafil (CIALIS) 10 MG tablet  Self No No   Sig: Take 1/2 or 1 tablet every 36 hours as needed for sexual activity      Facility-Administered Medications: None       Past Medical History:   Diagnosis Date    Cancer (HCC)     prostate    Colon polyp     HL (hearing loss)     Murmur, heart     Obesity     Patient denies medical problems        Past Surgical History:   Procedure Laterality Date    APPENDECTOMY      COLONOSCOPY      CYST REMOVAL Right     right index finger (knuckle)    HAND SURGERY       PROSTATECTOMY      TONSILLECTOMY         Family History   Problem Relation Age of Onset    Diabetes Mother     Arthritis Mother     Stroke Father     Diabetes Sister      I have reviewed and agree with the history as documented.    E-Cigarette/Vaping    E-Cigarette Use Never User      E-Cigarette/Vaping Substances     Social History     Tobacco Use    Smoking status: Never     Passive exposure: Never    Smokeless tobacco: Never   Vaping Use    Vaping status: Never Used   Substance Use Topics    Alcohol use: Not Currently     Comment: 1 beer per month    Drug use: Never       Review of Systems   Constitutional:  Negative for chills and fever.   Respiratory:  Negative for shortness of breath.    Cardiovascular:  Negative for chest pain.   Gastrointestinal:  Positive for abdominal pain, constipation, diarrhea and nausea. Negative for vomiting.   Genitourinary:  Negative for decreased urine volume, dysuria, flank pain, frequency and hematuria.   Musculoskeletal:  Negative for back pain.   Skin:  Positive for color change.       Physical Exam  Physical Exam  Vitals and nursing note reviewed.   Constitutional:       General: He is not in acute distress.     Appearance: Normal appearance. He is well-developed. He is not ill-appearing, toxic-appearing or diaphoretic.   HENT:      Head: Normocephalic and atraumatic.      Mouth/Throat:      Mouth: Mucous membranes are moist.   Eyes:      General: Scleral icterus present.      Conjunctiva/sclera: Conjunctivae normal.      Pupils: Pupils are equal, round, and reactive to light.   Cardiovascular:      Rate and Rhythm: Normal rate and regular rhythm.      Pulses: Normal pulses.      Heart sounds: Normal heart sounds. No murmur heard.  Pulmonary:      Effort: Pulmonary effort is normal. No respiratory distress.      Breath sounds: Normal breath sounds. No stridor. No wheezing, rhonchi or rales.   Chest:      Chest wall: No tenderness.   Abdominal:      General: Bowel sounds are  normal. There is no distension.      Palpations: Abdomen is soft.      Tenderness: There is abdominal tenderness in the right upper quadrant, epigastric area and left upper quadrant. There is no right CVA tenderness, left CVA tenderness, guarding or rebound. Negative signs include Moss's sign, Rovsing's sign, McBurney's sign and psoas sign.   Skin:     General: Skin is warm and dry.      Coloration: Skin is jaundiced.   Neurological:      General: No focal deficit present.      Mental Status: He is alert and oriented to person, place, and time. Mental status is at baseline.   Psychiatric:         Mood and Affect: Mood normal.         Behavior: Behavior normal.         Vital Signs  ED Triage Vitals [03/10/24 1510]   Temperature Pulse Respirations Blood Pressure SpO2   97.9 °F (36.6 °C) 72 18 153/89 98 %      Temp Source Heart Rate Source Patient Position - Orthostatic VS BP Location FiO2 (%)   Temporal Monitor Lying Right arm --      Pain Score       No Pain           Vitals:    03/10/24 1830 03/10/24 1900 03/10/24 2000 03/10/24 2030   BP: 116/69 137/77 161/83 148/84   Pulse: 62 71 76 73   Patient Position - Orthostatic VS:             Visual Acuity      ED Medications  Medications   iohexol (OMNIPAQUE) 350 MG/ML injection (MULTI-DOSE) 100 mL (100 mL Intravenous Given 3/10/24 1641)       Diagnostic Studies  Results Reviewed       Procedure Component Value Units Date/Time    Bilirubin, direct [[id number]]  (Abnormal) Collected: 03/10/24 1536    Lab Status: Final result Specimen: Blood from Arm, Left Updated: 03/10/24 1717     Bilirubin, Direct 4.02 mg/dL     UA w Reflex to Microscopic w Reflex to Culture [[id number]]  (Abnormal) Collected: 03/10/24 1616    Lab Status: Final result Specimen: Urine, Other Updated: 03/10/24 1707     Color, UA Yellow     Clarity, UA Clear     Specific Gravity, UA <1.005     pH, UA 7.0     Leukocytes, UA Negative     Nitrite, UA Negative     Protein, UA Negative mg/dl      Glucose, UA  Negative mg/dl      Ketones, UA Negative mg/dl      Urobilinogen, UA <2.0 mg/dl      Bilirubin, UA Small     Occult Blood, UA Negative    Hepatitis panel, acute [[id number]] Updated: 03/10/24 1659    Lab Status: In process Specimen: Blood     Comprehensive metabolic panel [[id number]]  (Abnormal) Collected: 03/10/24 1536    Lab Status: Final result Specimen: Blood from Arm, Left Updated: 03/10/24 1610     Sodium 140 mmol/L      Potassium 4.0 mmol/L      Chloride 103 mmol/L      CO2 31 mmol/L      ANION GAP 6 mmol/L      BUN 13 mg/dL      Creatinine 1.01 mg/dL      Glucose 131 mg/dL      Calcium 9.5 mg/dL       U/L      ALT 1,060 U/L      Alkaline Phosphatase 416 U/L      Total Protein 6.8 g/dL      Albumin 4.2 g/dL      Total Bilirubin 5.96 mg/dL      eGFR 79 ml/min/1.73sq m     Narrative:      National Kidney Disease Foundation guidelines for Chronic Kidney Disease (CKD):     Stage 1 with normal or high GFR (GFR > 90 mL/min/1.73 square meters)    Stage 2 Mild CKD (GFR = 60-89 mL/min/1.73 square meters)    Stage 3A Moderate CKD (GFR = 45-59 mL/min/1.73 square meters)    Stage 3B Moderate CKD (GFR = 30-44 mL/min/1.73 square meters)    Stage 4 Severe CKD (GFR = 15-29 mL/min/1.73 square meters)    Stage 5 End Stage CKD (GFR <15 mL/min/1.73 square meters)  Note: GFR calculation is accurate only with a steady state creatinine    Lipase [[id number]]  (Abnormal) Collected: 03/10/24 1536    Lab Status: Final result Specimen: Blood from Arm, Left Updated: 03/10/24 1610     Lipase 8 u/L     Protime-INR [[id number]]  (Normal) Collected: 03/10/24 1536    Lab Status: Final result Specimen: Blood from Arm, Left Updated: 03/10/24 1556     Protime 12.3 seconds      INR 0.88    APTT [[id number]]  (Normal) Collected: 03/10/24 1536    Lab Status: Final result Specimen: Blood from Arm, Left Updated: 03/10/24 1556     PTT 25 seconds     CBC and differential [[id number]]  (Abnormal) Collected: 03/10/24 1536    Lab Status: Final result  Specimen: Blood from Arm, Left Updated: 03/10/24 1542     WBC 6.12 Thousand/uL      RBC 4.30 Million/uL      Hemoglobin 13.0 g/dL      Hematocrit 39.9 %      MCV 93 fL      MCH 30.2 pg      MCHC 32.6 g/dL      RDW 15.0 %      MPV 12.8 fL      Platelets 180 Thousands/uL      nRBC 0 /100 WBCs      Neutrophils Relative 63 %      Immat GRANS % 0 %      Lymphocytes Relative 21 %      Monocytes Relative 11 %      Eosinophils Relative 4 %      Basophils Relative 1 %      Neutrophils Absolute 3.87 Thousands/µL      Immature Grans Absolute 0.02 Thousand/uL      Lymphocytes Absolute 1.27 Thousands/µL      Monocytes Absolute 0.66 Thousand/µL      Eosinophils Absolute 0.24 Thousand/µL      Basophils Absolute 0.06 Thousands/µL                    CT abdomen pelvis with contrast   Final Result by Hunter Echevarria MD (03/10 1842)      1.  Ill-defined 1.5 x 1.2 x 2.1 cm heterogeneously enhancing hypodense mass in the pancreatic head/uncinate process region noted with associated biliary and pancreatic ductal dilatation. This is highly concerning for adenocarcinoma of the pancreas. A few    nonspecific subcentimeter upper abdominal peripancreatic mesenteric lymph nodes are seen.   2.  Mild colonic constipation. No evidence for bowel obstruction, inflammation, obstructive uropathy, free air, or free fluid.         Workstation performed: [id number]                    Procedures  Procedures         ED Course  ED Course as of 03/10/24 2139   Sun Mar 10, 2024   1800 BILIRUBIN DIRECT(!): 4.02   1850 1.  Ill-defined 1.5 x 1.2 x 2.1 cm heterogeneously enhancing hypodense mass in the pancreatic head/uncinate process region noted with associated biliary and pancreatic ductal dilatation. This is highly concerning for adenocarcinoma of the pancreas. A few   nonspecific subcentimeter upper abdominal peripancreatic mesenteric lymph nodes are seen.  2.  Mild colonic constipation. No evidence for bowel obstruction, inflammation, obstructive uropathy,  free air, or free fluid.        1850 TT surgery Chapis DELANEY review case for dispo/ plan of care.   1932 Discussed case with Dr. Jason, surg-onc. Recommends EUS and stent and so transfer to [facility] medicine team with consults to GI and surg.onc   2026 Patient accepted to [facility] by Lincoln Linn   2136 Had a lengthy discussion with the patient and his wife via telephone updating on plan of care, reason for transfer and findings of the labs and imaging. All questions answered. Emotional support provided.                               SBIRT 22yo+      Flowsheet Row Most Recent Value   Initial Alcohol Screen: US AUDIT-C     1. How often do you have a drink containing alcohol? 0 Filed at: 03/10/2024 1509   2. How many drinks containing alcohol do you have on a typical day you are drinking?  0 Filed at: 03/10/2024 1509   3a. Male UNDER 65: How often do you have five or more drinks on one occasion? 0 Filed at: 03/10/2024 1509   3b. FEMALE Any Age, or MALE 65+: How often do you have 4 or more drinks on one occassion? 0 Filed at: 03/10/2024 1509   Audit-C Score 0 Filed at: 03/10/2024 1509   MINH: How many times in the past year have you...    Used an illegal drug or used a prescription medication for non-medical reasons? Never Filed at: 03/10/2024 1509                      Medical Decision Making  Assessment and Plan:  Differential includes cholecystitis versus cholangitis versus pancreatitis versus gastritis versus colitis.  Check labs to evaluate for leukocytosis, anemia, electrolyte abnormalities, kidney and liver function; urinalysis to assess for urinary tract infection; CT scan abdomen/pelvis for further evaluation of aforementioned differential.    Review of medical records, specifically from GI note from 2/27/2024 shows that the patient has a past medical history significant for intermittent constipation/diarrhea, anemia, hyperlipidemia.     Amount and/or Complexity of Data Reviewed  Labs: ordered. Decision-making details  documented in ED Course.  Radiology: ordered.    Risk  Prescription drug management.             Disposition  Final diagnoses:   Elevated LFTs   Jaundice   Abdominal pain   Mass of head of pancreas     Time reflects when diagnosis was documented in both MDM as applicable and the Disposition within this note       Time User Action Codes Description Comment    3/10/2024  4:36 PM Lauryn Fisher [R79.89] Elevated LFTs     3/10/2024  4:36 PM Lauryn Fisher [R17] Jaundice     3/10/2024  4:36 PM Lauryn Fisher Add [R10.9] Abdominal pain     3/10/2024  7:36 PM Lauryn Fisher [K86.89] Mass of head of pancreas           ED Disposition       ED Disposition   Transfer to Another Facility-In Network    Condition   --    Date/Time   Sun Mar 10, 2024 1936    Comment   Transfer to [facility]               MD Documentation      Flowsheet Row Most Recent Value   Patient Condition The patient has been stabilized such that within reasonable medical probability, no material deterioration of the patient condition or the condition of the unborn child(emily) is likely to result from the transfer   Reason for Transfer Level of Care needed not available at this facility   Benefits of Transfer Specialized equipment and/or services available at the receiving facility (Include comment)________________________   Risks of Transfer Potential for delay in receiving treatment   Accepting Physician Dr. Linn   Accepting Facility Name, City & State  [facility]    (Name & Tel number) [facility]   Provider Certification General risk, such as traffic hazards, adverse weather conditions, rough terrain or turbulence, possible failure of equipment (including vehicle or aircraft), or consequences of actions of persons outside the control of the transport personnel, Risk of worsening condition, Unanticipated needs of medical equipment and personnel during transport, The possibility of a transport vehicle  being unavailable          RN Documentation      Flowsheet Row Most Recent Value   Accepting Facility Name, City & State  [facility]    (Name & Tel number) [facility]          Follow-up Information    None         Patient's Medications   Discharge Prescriptions    No medications on file       No discharge procedures on file.    PDMP Review       None            ED Provider  Electronically Signed by             Lauryn Fisher DO  03/10/24 1553

## 2024-03-10 NOTE — TELEPHONE ENCOUNTER
"Regarding: Dark urine  ----- Message from Angle Hawkins sent at 3/10/2024  1:21 PM EDT -----  Pt stated, \" I have been dealing with alot of GI issues, I have also been seeing a GI doctor. Today my urine has gotten really dark, I have been keeping myself hydrated.  I have abdominal cramping, my wife said I am starting to look Jaundice, I also have a lot of gas for about 3-4 days.  I want to see if I should go to the er or urgent care. \"    "

## 2024-03-10 NOTE — Clinical Note
Case was discussed with hospitalist and the patient's admission status was agreed to be Admission Status: inpatient status to the service of Dr. Nash .

## 2024-03-10 NOTE — LETTER
[facility] [address]  [address]  [place] [address]  Dept: [phone]    March 14, 2024     Patient: Brenton Saez   YOB: 1960   Date of Visit: 3/10/2024       To Whom it May Concern:    Brenton Saez is under my professional care. He was seen in the hospital from 3/10/2024 to 03/14/24. He may return to work on 3/15/24 without limitations.    If you have any questions or concerns, please don't hesitate to call.         Sincerely,          Nahed Diggs PA-C

## 2024-03-11 ENCOUNTER — APPOINTMENT (INPATIENT)
Dept: RADIOLOGY | Facility: HOSPITAL | Age: 64
DRG: 722 | End: 2024-03-11
Payer: COMMERCIAL

## 2024-03-11 PROBLEM — R79.89 LFT ELEVATION: Status: ACTIVE | Noted: 2024-03-10

## 2024-03-11 LAB
AFP-TM SERPL-MCNC: 2.57 NG/ML (ref 0–9)
ALBUMIN SERPL BCG-MCNC: 3.6 G/DL (ref 3.5–5)
ALP SERPL-CCNC: 412 U/L (ref 34–104)
ALT SERPL W P-5'-P-CCNC: 981 U/L (ref 7–52)
AST SERPL W P-5'-P-CCNC: 503 U/L (ref 13–39)
BILIRUB DIRECT SERPL-MCNC: 3.6 MG/DL (ref 0–0.2)
BILIRUB SERPL-MCNC: 5.67 MG/DL (ref 0.2–1)
CEA SERPL-MCNC: 3.3 NG/ML (ref 0–3)
EST. AVERAGE GLUCOSE BLD GHB EST-MCNC: 131 MG/DL
HAV IGM SER QL: NORMAL
HBA1C MFR BLD: 6.2 %
HBV CORE IGM SER QL: NORMAL
HBV SURFACE AG SER QL: NORMAL
HCV AB SER QL: NORMAL
PROT SERPL-MCNC: 5.6 G/DL (ref 6.4–8.4)

## 2024-03-11 PROCEDURE — 99233 SBSQ HOSP IP/OBS HIGH 50: CPT | Performed by: INTERNAL MEDICINE

## 2024-03-11 PROCEDURE — 82105 ALPHA-FETOPROTEIN SERUM: CPT | Performed by: STUDENT IN AN ORGANIZED HEALTH CARE EDUCATION/TRAINING PROGRAM

## 2024-03-11 PROCEDURE — 80076 HEPATIC FUNCTION PANEL: CPT | Performed by: INTERNAL MEDICINE

## 2024-03-11 PROCEDURE — 99255 IP/OBS CONSLTJ NEW/EST HI 80: CPT | Performed by: SURGERY

## 2024-03-11 PROCEDURE — 71250 CT THORAX DX C-: CPT

## 2024-03-11 PROCEDURE — 82378 CARCINOEMBRYONIC ANTIGEN: CPT | Performed by: STUDENT IN AN ORGANIZED HEALTH CARE EDUCATION/TRAINING PROGRAM

## 2024-03-11 PROCEDURE — 99255 IP/OBS CONSLTJ NEW/EST HI 80: CPT | Performed by: INTERNAL MEDICINE

## 2024-03-11 PROCEDURE — 86301 IMMUNOASSAY TUMOR CA 19-9: CPT | Performed by: STUDENT IN AN ORGANIZED HEALTH CARE EDUCATION/TRAINING PROGRAM

## 2024-03-11 RX ORDER — POLYETHYLENE GLYCOL 3350 17 G/17G
17 POWDER, FOR SOLUTION ORAL DAILY
Status: DISCONTINUED | OUTPATIENT
Start: 2024-03-11 | End: 2024-03-14 | Stop reason: HOSPADM

## 2024-03-11 RX ADMIN — ACETAMINOPHEN 650 MG: 325 TABLET, FILM COATED ORAL at 10:37

## 2024-03-11 RX ADMIN — ACETAMINOPHEN 650 MG: 325 TABLET, FILM COATED ORAL at 21:51

## 2024-03-11 RX ADMIN — ACETAMINOPHEN 650 MG: 325 TABLET, FILM COATED ORAL at 00:44

## 2024-03-11 NOTE — EMTALA/ACUTE CARE TRANSFER
[facility] EMERGENCY DEPARTMENT  [address] [facility] [address]  Dept: [phone]      EMTALA TRANSFER CONSENT    NAME Brenton Saez                                         1960                              MRN [id number]    I have been informed of my rights regarding examination, treatment, and transfer   by Dr. Lauryn Fisher DO    Benefits: Specialized equipment and/or services available at the receiving facility (Include comment)________________________    Risks: Potential for delay in receiving treatment      Consent for Transfer:  I acknowledge that my medical condition has been evaluated and explained to me by the emergency department physician or other qualified medical person and/or my attending physician, who has recommended that I be transferred to the service of  Accepting Physician: Dr. Linn at Accepting Facility Name, City & State : [facility]. The above potential benefits of such transfer, the potential risks associated with such transfer, and the probable risks of not being transferred have been explained to me, and I fully understand them.  The doctor has explained that, in my case, the benefits of transfer outweigh the risks.  I agree to be transferred.    I authorize the performance of emergency medical procedures and treatments upon me in both transit and upon arrival at the receiving facility.  Additionally, I authorize the release of any and all medical records to the receiving facility and request they be transported with me, if possible.  I understand that the safest mode of transportation during a medical emergency is an ambulance and that the Hospital advocates the use of this mode of transport. Risks of traveling to the receiving facility by car, including absence of medical control, life sustaining equipment, such as oxygen, and medical personnel has been explained to me and I fully understand them.    (ABDIEL CORRECT BOX BELOW)  [  ]  I  consent to the stated transfer and to be transported by ambulance/helicopter.  [  ]  I consent to the stated transfer, but refuse transportation by ambulance and accept full responsibility for my transportation by car.  I understand the risks of non-ambulance transfers and I exonerate the Hospital and its staff from any deterioration in my condition that results from this refusal.    X___________________________________________    DATE  03/10/24  TIME________  Signature of patient or legally responsible individual signing on patient behalf           RELATIONSHIP TO PATIENT_________________________          Provider Certification    NAME Brenton Saez                                         1960                              MRN [id number]    A medical screening exam was performed on the above named patient.  Based on the examination:    Condition Necessitating Transfer The primary encounter diagnosis was Elevated LFTs. Diagnoses of Jaundice, Abdominal pain, and Mass of head of pancreas were also pertinent to this visit.    Patient Condition: The patient has been stabilized such that within reasonable medical probability, no material deterioration of the patient condition or the condition of the unborn child(emily) is likely to result from the transfer    Reason for Transfer: Level of Care needed not available at this facility    Transfer Requirements: Facility B   Space available and qualified personnel available for treatment as acknowledged by [facility]  Agreed to accept transfer and to provide appropriate medical treatment as acknowledged by       Dr. Linn  Appropriate medical records of the examination and treatment of the patient are provided at the time of transfer   STAFF INITIAL WHEN COMPLETED _______  Transfer will be performed by qualified personnel from    and appropriate transfer equipment as required, including the use of necessary and appropriate life support measures.    Provider  Certification: I have examined the patient and explained the following risks and benefits of being transferred/refusing transfer to the patient/family:  General risk, such as traffic hazards, adverse weather conditions, rough terrain or turbulence, possible failure of equipment (including vehicle or aircraft), or consequences of actions of persons outside the control of the transport personnel, Risk of worsening condition, Unanticipated needs of medical equipment and personnel during transport, The possibility of a transport vehicle being unavailable      Based on these reasonable risks and benefits to the patient and/or the unborn child(emily), and based upon the information available at the time of the patient’s examination, I certify that the medical benefits reasonably to be expected from the provision of appropriate medical treatments at another medical facility outweigh the increasing risks, if any, to the individual’s medical condition, and in the case of labor to the unborn child, from effecting the transfer.    X____________________________________________ DATE 03/10/24        TIME_______      ORIGINAL - SEND TO MEDICAL RECORDS   COPY - SEND WITH PATIENT DURING TRANSFER

## 2024-03-11 NOTE — ASSESSMENT & PLAN NOTE
"Presented with worsening abdominal pain with associated darker urine.  Noted to have elevated LFTs  CT abdomen noted for \"Ill-defined 1.5 x 1.2 x 2.1 cm heterogeneously enhancing hypodense mass in the pancreatic head/uncinate process region noted with associated biliary and pancreatic ductal dilatation with few nonspecific subcentimeter upper abdominal peripancreatic mesenteric lymph nodes are seen\"  Concerns for adenocarcinoma the pancreas.  Need ERCP/EUS for biopsy/stent. Consult GI  Will need eventual surgical oncology evaluation.   Pain management  "

## 2024-03-11 NOTE — CASE MANAGEMENT
Case Management Assessment & Discharge Planning Note    Patient name Brenton Saez  Location [facility] 927/[facility] 927-01 MRN [id number]  : 1960 Date 3/11/2024       Current Admission Date: 3/10/2024  Current Admission Diagnosis:Pancreatic mass concerning for pancreatic CA   Patient Active Problem List    Diagnosis Date Noted    LFT elevation 03/10/2024    Pancreatic mass concerning for pancreatic CA 03/10/2024    Constipation 03/10/2024    Wears hearing aid in left ear 2023    Benign paroxysmal positional vertigo 2023    Hyperlipidemia 2023    Hearing loss associated with syndrome of left ear 2023    Hyperglycemia 2023    Erectile dysfunction 2023    Heart valve disease 2021    Malignant neoplasm of prostate (HCC) 2021    Elevated PSA 2020    Nocturia 2020    Urinary urgency 2020      LOS (days): 1  Geometric Mean LOS (GMLOS) (days):   Days to GMLOS:     OBJECTIVE:    Risk of Unplanned Readmission Score: 6.95         Current admission status: Inpatient       Preferred Pharmacy:   [address] [address]  Phone: [phone] Fax: [phone]    Primary Care Provider: Aries Watres MD    Primary Insurance: [employer]  Secondary Insurance:     ASSESSMENT:  Active Health Care Proxies    There are no active Health Care Proxies on file.       Advance Directives  Does patient have a Health Care POA?: Yes  Does patient have Advance Directives?: Yes  Advance Directives: Living will         Readmission Root Cause  30 Day Readmission: No    Patient Information  Admitted from:: Home  Mental Status: Alert  During Assessment patient was accompanied by: Spouse, Son  Assessment information provided by:: Patient, Spouse  Primary Caregiver: Self  Support Systems: Family members, Spouse/significant other  What city do you live in?: [place]  Home entry access options. Select all that apply.:  Stairs  Number of steps to enter home.: 1  Do the steps have railings?: No  Type of Current Residence: 2 story home  Upon entering residence, is there a bedroom on the main floor (no further steps)?: No  A bedroom is located on the following floor levels of residence (select all that apply):: 2nd Floor  Upon entering residence, is there a bathroom on the main floor (no further steps)?: Yes (1/2 bath on first floor and full bath on 2nd)  Number of steps to 2nd floor from main floor: One Flight  Living Arrangements: Lives w/ Spouse/significant other    Activities of Daily Living Prior to Admission  Functional Status: Independent  Completes ADLs independently?: Yes  Ambulates independently?: Yes  Does patient use assisted devices?: No  Does patient have a history of Outpatient Therapy (PT/OT)?: Yes  Does the patient have a history of Short-Term Rehab?: No  Does patient have a history of HHC?: No  Does patient currently have HHC?: No         Patient Information Continued  Income Source: Employed  Does patient receive dialysis treatments?: No  Does patient have a history of substance abuse?: No  Does patient have a history of Mental Health Diagnosis?: No         Means of Transportation  Means of Transport to Appts:: Drives Self      Social Determinants of Health (SDOH)      Flowsheet Row Most Recent Value   Housing Stability    In the last 12 months, was there a time when you were not able to pay the mortgage or rent on time? N   In the last 12 months, how many places have you lived? 1   In the last 12 months, was there a time when you did not have a steady place to sleep or slept in a shelter (including now)? N   Transportation Needs    In the past 12 months, has lack of transportation kept you from medical appointments or from getting medications? no   In the past 12 months, has lack of transportation kept you from meetings, work, or from getting things needed for daily living? No   Food Insecurity    Within the past  12 months, you worried that your food would run out before you got the money to buy more. Never true   Within the past 12 months, the food you bought just didn't last and you didn't have money to get more. Never true   Utilities    In the past 12 months has the electric, gas, oil, or water company threatened to shut off services in your home? No            DISCHARGE DETAILS:    Discharge planning discussed with:: Patient, patient's spouse, Eneida, and patient's son, Dorian, at bedside  [place] of Choice: Yes     CM contacted family/caregiver?: Yes

## 2024-03-11 NOTE — PROGRESS NOTES
"[facility]  Progress Note  Name: Brenton Saez I  MRN: [id number]  Unit/Bed#: [facility] 927-01 I Date of Admission: 3/10/2024   Date of Service: 3/11/2024 I Hospital Day: 1    Assessment/Plan   LFT elevation  Assessment & Plan  Noted elevated ALT, AST, ALP and direct bilirubin in the settings of above.   Recent Labs     03/10/24  1536 03/11/24  0634   * 503*   ALT 1,060* 981*   ALKPHOS 416* 412*   TBILI 5.96* 5.67*   BILIDIR 4.02* 3.60*      Plan as above  Monitor LFTs  Hold statin, avoid hepatotoxic agents    Hyperlipidemia  Assessment & Plan  Hold statin.     * Pancreatic mass concerning for pancreatic CA  Assessment & Plan  Presented with worsening abdominal pain with associated darker urine.  Noted to have elevated LFTs  CT abdomen noted for \"Ill-defined 1.5 x 1.2 x 2.1 cm heterogeneously enhancing hypodense mass in the pancreatic head/uncinate process region noted with associated biliary and pancreatic ductal dilatation with few nonspecific subcentimeter upper abdominal peripancreatic mesenteric lymph nodes are seen\"    GI following, appreciate recommendations.  Pending ERCP and EUS  CT chest-without evidence of metastasis  Surgical oncology following, recommended repeating C-19-9 once jaundice has resolved  Pain management  Supportive care  Monitor hemodynamics               VTE Pharmacologic Prophylaxis: VTE Score: 3 Low Risk (Score 0-2) - Encourage Ambulation.    Mobility:   Basic Mobility Inpatient Raw Score: 24  JH-HLM Goal: 8: Walk 250 feet or more  JH-HLM Achieved: 8: Walk 250 feet ot more  HLM Goal achieved. Continue to encourage appropriate mobility.    Patient Centered Rounds: I performed bedside rounds with nursing staff today.   Discussions with Specialists or Other Care Team Provider: YES    Education and Discussions with Family / Patient: Updated  (wife and son) at bedside.    Total Time Spent on Date of Encounter in care of patient: 38 " mins. This time was spent on one or more of the following: performing physical exam; counseling and coordination of care; obtaining or reviewing history; documenting in the medical record; reviewing/ordering tests, medications or procedures; communicating with other healthcare professionals and discussing with patient's family/caregivers.    Current Length of Stay: 1 day(s)  Current Patient Status: Inpatient   Certification Statement: The patient will continue to require additional inpatient hospital stay due to PENDING EVAL   Discharge Plan: Anticipate discharge in 24-48 hrs to home.    Code Status: Level 1 - Full Code    Subjective:   Seen and examined at bedside  Awaiting ERCP/EUS  Wife and son updated at bedside  Denies any complaints    Objective:     Vitals:   Temp (24hrs), Av.1 °F (36.7 °C), Min:97.9 °F (36.6 °C), Max:98.5 °F (36.9 °C)    Temp:  [97.9 °F (36.6 °C)-98.5 °F (36.9 °C)] 98.5 °F (36.9 °C)  HR:  [55-76] 60  Resp:  [17-18] 17  BP: (115-161)/(59-89) 118/63  SpO2:  [95 %-100 %] 98 %  Body mass index is 29.34 kg/m².     Input and Output Summary (last 24 hours):     Intake/Output Summary (Last 24 hours) at 3/11/2024 1159  Last data filed at 3/11/2024 0129  Gross per 24 hour   Intake 480 ml   Output --   Net 480 ml       Physical Exam:   Physical Exam  Vitals reviewed.   Constitutional:       Appearance: Normal appearance.   HENT:      Head: Atraumatic.      Mouth/Throat:      Mouth: Mucous membranes are dry.   Eyes:      General: No scleral icterus.  Cardiovascular:      Rate and Rhythm: Normal rate.      Heart sounds: Normal heart sounds.   Pulmonary:      Effort: No respiratory distress.   Abdominal:      General: Bowel sounds are normal.      Palpations: There is no mass.      Tenderness: There is abdominal tenderness. There is no guarding.   Musculoskeletal:      Cervical back: Neck supple.      Right lower leg: No edema.      Left lower leg: No edema.   Skin:     General: Skin is dry.    Neurological:      Mental Status: He is alert and oriented to person, place, and time.   Psychiatric:         Mood and Affect: Mood normal.          Additional Data:     Labs:  Results from last 7 days   Lab Units 03/10/24  1536   WBC Thousand/uL 6.12   HEMOGLOBIN g/dL 13.0   HEMATOCRIT % 39.9   PLATELETS Thousands/uL 180   NEUTROS PCT % 63   LYMPHS PCT % 21   MONOS PCT % 11   EOS PCT % 4     Results from last 7 days   Lab Units 03/11/24  0634 03/10/24  1536   SODIUM mmol/L  --  140   POTASSIUM mmol/L  --  4.0   CHLORIDE mmol/L  --  103   CO2 mmol/L  --  31   BUN mg/dL  --  13   CREATININE mg/dL  --  1.01   ANION GAP mmol/L  --  6   CALCIUM mg/dL  --  9.5   ALBUMIN g/dL 3.6 4.2   TOTAL BILIRUBIN mg/dL 5.67* 5.96*   ALK PHOS U/L 412* 416*   ALT U/L 981* 1,060*   AST U/L 503* 491*   GLUCOSE RANDOM mg/dL  --  131     Results from last 7 days   Lab Units 03/10/24  1536   INR  0.88                   Lines/Drains:  Invasive Devices       Peripheral Intravenous Line  Duration             Peripheral IV 03/10/24 Left Antecubital <1 day                          Imaging: Reviewed radiology reports from this admission including: chest CT scan    Recent Cultures (last 7 days):         Last 24 Hours Medication List:   Current Facility-Administered Medications   Medication Dose Route Frequency Provider Last Rate    acetaminophen  650 mg Oral Q6H PRN Lincoln Linn MD      polyethylene glycol  17 g Oral Daily Orlando Jackson DO          Today, Patient Was Seen By: Carley Cheema MD    **Please Note: This note may have been constructed using a voice recognition system.**

## 2024-03-11 NOTE — ASSESSMENT & PLAN NOTE
Noted elevated ALT, AST, ALP and direct bilirubin in the settings of above.   Plan as above  Monitor LFTs

## 2024-03-11 NOTE — CONSULTS
Consultation -  [facility]  Brenton Saez 63 y.o. male MRN: [id number]  Unit/Bed#: [facility] 927-01 Encounter: [id number]            Inpatient consult to gastroenterology     Date/Time  3/11/2024 10:06 AM     Performed by  Orlando Jackson DO   Authorized by  Lincoln Linn MD             Reason for Consult / Principal Problem:     Pancreatic mass      ASSESSMENT AND PLAN:      63-year-old male with past medical history of constipation, prostate cancer status post prostatectomy who was admitted for abdominal pain, change in urine color.  GI is consulted due to concern for pancreatic mass on imaging.    Pancreatic mass  Biliary obstruction  Elevated LFTs  Abdominal pain  Bilirubin 5.67.  Mostly direct. On CT he has a 1.5 x 1.2 x 2.1 cm heterogenous mass in the pancreatic head with upstream PD and CBD dilation.  Concerning for malignancy.  Acute hepatitis panel negative.  Plan for EUS/ERCP.  Timing to be determined based on GI lab availability.  Resume diet, keep empirically n.p.o. at midnight.  MiraLAX as needed for constipation.  Pain management per primary team.  Agree with CT chest for staging.  Check CEA, AFP, CA 19-9 for baseline.  Monitor LFTs daily.    Rest of care per primary team.  Thank you for this consultation.   ______________________________________________________________________    HPI:  63-year-old male with past medical history of constipation, prostate cancer status post prostatectomy who was admitted for abdominal pain, change in urine color.  GI is consulted due to concern for pancreatic mass on imaging.  He has been doing with GI issues for months which include abdominal pain and constipation.  He presented to the ED due to change in urine color.  His abdominal pain is generalized.  Denies any nausea, vomiting.  Denies significant weight loss.  Denies any family history of pancreatic or other cancers.  Denies smoking history.  Drinks socially.      REVIEW OF SYSTEMS:    Review  of Systems   Constitutional:  Negative for chills and fever.   HENT:  Negative for congestion and sinus pressure.    Respiratory:  Negative for cough and shortness of breath.    Cardiovascular:  Negative for chest pain, palpitations and leg swelling.   Gastrointestinal:  Negative for abdominal pain, diarrhea, nausea and vomiting.   Genitourinary:  Negative for dysuria and hematuria.   Musculoskeletal:  Negative for arthralgias and back pain.   Skin:  Negative for color change and rash.   Neurological:  Negative for dizziness and headaches.   Psychiatric/Behavioral:  Negative for agitation and confusion.    All other systems reviewed and are negative.         Historical Information   Past Medical History:   Diagnosis Date    Cancer (HCC)     prostate    Colon polyp     HL (hearing loss)     Murmur, heart     Obesity     Patient denies medical problems      Past Surgical History:   Procedure Laterality Date    APPENDECTOMY      COLONOSCOPY      CYST REMOVAL Right 2018    right index finger (knuckle)    HAND SURGERY      PROSTATECTOMY      TONSILLECTOMY       Social History   Social History     Substance and Sexual Activity   Alcohol Use Not Currently    Comment: 1 beer per month     Social History     Substance and Sexual Activity   Drug Use Never     Social History     Tobacco Use   Smoking Status Never    Passive exposure: Never   Smokeless Tobacco Never     Family History   Problem Relation Age of Onset    Diabetes Mother     Arthritis Mother     Stroke Father     Diabetes Sister        Meds/Allergies     Medications Prior to Admission   Medication    ferrous sulfate 325 (65 Fe) mg tablet    Multiple Vitamins-Minerals (Multi For Him 50+) TABS    rosuvastatin (Crestor) 5 mg tablet    tadalafil (CIALIS) 10 MG tablet    azelastine (ASTELIN) 0.1 % nasal spray     Current Facility-Administered Medications   Medication Dose Route Frequency    acetaminophen (TYLENOL) tablet 650 mg  650 mg Oral Q6H PRN    polyethylene  "glycol (MIRALAX) packet 17 g  17 g Oral Daily PRN       Allergies   Allergen Reactions    Penicillins Other (See Comments)     Unsure as a child           Objective     Blood pressure 118/63, pulse 60, temperature 98.5 °F (36.9 °C), resp. rate 17, height 5' 9.49\" (1.765 m), weight 91.4 kg (201 lb 8 oz), SpO2 98%. Body mass index is 29.34 kg/m².      Intake/Output Summary (Last 24 hours) at 3/11/2024 0850  Last data filed at 3/11/2024 0129  Gross per 24 hour   Intake 480 ml   Output --   Net 480 ml         PHYSICAL EXAM:      Physical Exam  Vitals and nursing note reviewed.   Constitutional:       General: He is not in acute distress.     Appearance: Normal appearance. He is well-developed. He is not ill-appearing.   HENT:      Head: Normocephalic and atraumatic.      Mouth/Throat:      Mouth: Mucous membranes are moist.   Eyes:      General: Scleral icterus present.      Extraocular Movements: Extraocular movements intact.      Conjunctiva/sclera: Conjunctivae normal.   Cardiovascular:      Rate and Rhythm: Normal rate.      Pulses: Normal pulses.   Pulmonary:      Effort: Pulmonary effort is normal.   Abdominal:      General: Abdomen is flat. Bowel sounds are normal. There is no distension.      Palpations: Abdomen is soft.      Tenderness: There is no abdominal tenderness. There is no guarding.   Musculoskeletal:      Cervical back: Neck supple.      Right lower leg: No edema.      Left lower leg: No edema.   Skin:     General: Skin is warm and dry.      Coloration: Skin is not jaundiced.   Neurological:      General: No focal deficit present.      Mental Status: He is alert and oriented to person, place, and time.   Psychiatric:         Mood and Affect: Mood normal.         Behavior: Behavior normal.          Lab Results:   Admission on 03/10/2024   Component Date Value    Total Bilirubin 03/11/2024 5.67 (H)     Bilirubin, Direct 03/11/2024 3.60 (H)     Alkaline Phosphatase 03/11/2024 412 (H)     AST 03/11/2024 " 503 (H)     ALT 03/11/2024 981 (H)     Total Protein 03/11/2024 5.6 (L)     Albumin 03/11/2024 3.6        Imaging Studies: I have personally reviewed pertinent imaging studies.    Orlando Jackson D.O.  Gastroenterology Fellow  PGY-5  Available via [employer]  3/11/2024 8:50 AM

## 2024-03-11 NOTE — H&P
"[facility]  H&P  Name: Brenton Saez 63 y.o. male I MRN: [id number]  Unit/Bed#: [facility] 927-01 I Date of Admission: 3/10/2024   Date of Service: 3/10/2024 I Hospital Day: 0      Assessment/Plan   * Pancreatic mass  Assessment & Plan  Presented with worsening abdominal pain with associated darker urine.  Noted to have elevated LFTs  CT abdomen noted for \"Ill-defined 1.5 x 1.2 x 2.1 cm heterogeneously enhancing hypodense mass in the pancreatic head/uncinate process region noted with associated biliary and pancreatic ductal dilatation with few nonspecific subcentimeter upper abdominal peripancreatic mesenteric lymph nodes are seen\"  Concerns for adenocarcinoma the pancreas.  Need ERCP/EUS for biopsy/stent. Consult GI  Will need eventual surgical oncology evaluation.   Pain management    Transaminitis  Assessment & Plan  Noted elevated ALT, AST, ALP and direct bilirubin in the settings of above.   Plan as above  Monitor LFTs    Hyperlipidemia  Assessment & Plan  Hold statin.            VTE Pharmacologic Prophylaxis: VTE Score: 3  Holding off waiting for possible ERCP EUS tomorrow  Code Status: FC      Anticipated Length of Stay: Patient will be admitted on an inpatient basis with an anticipated length of stay of greater than 2 midnights secondary to transaminitis and pancreatic mass requiring further GI evaluation as inpatient with ERCP and EUS.    Total Time Spent on Date of Encounter in care of patient: 77 mins. This time was spent on one or more of the following: performing physical exam; counseling and coordination of care; obtaining or reviewing history; documenting in the medical record; reviewing/ordering tests, medications or procedures; communicating with other healthcare professionals and discussing with patient's family/caregivers.    Chief Complaint: Abdominal pain    History of Present Illness:  Brenton Saez is a 63 y.o. male with a PMH of prostate cancer s/p " radical prostatectomy and constipation,  who presents with worsening abdominal pain and dark urine. Reports his wife noticed that he is jaundiced as well. He was noted to have elevated LFTs and subsequently underwent CT abdomen which demonstrated pancreatic head mass causing biliary and pancreatic ductal dilation.  Transferred to [facility] for ERCP/EUS.    Review of Systems:  Review of Systems   Constitutional:  Negative for chills and fever.   HENT:  Negative for ear pain and sore throat.    Eyes:  Negative for pain and visual disturbance.   Respiratory:  Negative for cough and shortness of breath.    Cardiovascular:  Negative for chest pain and palpitations.   Gastrointestinal:  Positive for abdominal pain and constipation. Negative for vomiting.   Genitourinary:  Negative for dysuria and hematuria.   Musculoskeletal:  Negative for arthralgias and back pain.   Skin:  Negative for color change and rash.   Neurological:  Negative for seizures and syncope.   All other systems reviewed and are negative.      Past Medical and Surgical History:   Past Medical History:   Diagnosis Date    Cancer (HCC)     prostate    Colon polyp     HL (hearing loss)     Murmur, heart     Obesity     Patient denies medical problems        Past Surgical History:   Procedure Laterality Date    APPENDECTOMY      COLONOSCOPY      CYST REMOVAL Right 2018    right index finger (knuckle)    HAND SURGERY      PROSTATECTOMY      TONSILLECTOMY         Meds/Allergies:  Prior to Admission medications    Medication Sig Start Date End Date Taking? Authorizing Provider   azelastine (ASTELIN) 0.1 % nasal spray 2 sprays into each nostril in the morning Use in each nostril as directed    ONLY WHEN NEEDED    Historical Provider, MD   ferrous sulfate 325 (65 Fe) mg tablet 2 (two) times a day with meals 1/24/24   Historical Provider, MD   Multiple Vitamins-Minerals (Multi For Him 50+) TABS Take by mouth    Historical Provider, MD   rosuvastatin (Crestor) 5 mg  tablet  6/15/23   Historical Provider, MD   tadalafil (CIALIS) 10 MG tablet Take 1/2 or 1 tablet every 36 hours as needed for sexual activity 5/5/23   Aries Waters MD     I have reviewed home medications with patient personally.    Allergies:   Allergies   Allergen Reactions    Penicillins Other (See Comments)     Unsure as a child       Social History:  Marital Status: /Civil Union   Occupation:   Patient Pre-hospital Living Situation: Home  Patient Pre-hospital Level of Mobility: walks  Patient Pre-hospital Diet Restrictions:   Substance Use History:   Social History     Substance and Sexual Activity   Alcohol Use Not Currently    Comment: 1 beer per month     Social History     Tobacco Use   Smoking Status Never    Passive exposure: Never   Smokeless Tobacco Never     Social History     Substance and Sexual Activity   Drug Use Never       Family History:  Family History   Problem Relation Age of Onset    Diabetes Mother     Arthritis Mother     Stroke Father     Diabetes Sister        Physical Exam:     Vitals:        Physical Exam  Vitals and nursing note reviewed.   Constitutional:       General: He is not in acute distress.     Appearance: He is well-developed.   HENT:      Head: Normocephalic and atraumatic.   Eyes:      General: Scleral icterus present.   Cardiovascular:      Rate and Rhythm: Normal rate and regular rhythm.      Heart sounds: No murmur heard.  Pulmonary:      Effort: Pulmonary effort is normal. No respiratory distress.      Breath sounds: Normal breath sounds.   Abdominal:      Palpations: Abdomen is soft.      Tenderness: There is abdominal tenderness (Left upper-mid quadrant). There is no guarding or rebound.   Musculoskeletal:         General: No swelling.      Cervical back: Neck supple.   Skin:     General: Skin is warm and dry.      Capillary Refill: Capillary refill takes less than 2 seconds.      Coloration: Skin is jaundiced.   Neurological:      Mental Status: He is  alert.   Psychiatric:         Mood and Affect: Mood normal.          Additional Data:     Lab Results:  Results from last 7 days   Lab Units 03/10/24  1536   WBC Thousand/uL 6.12   HEMOGLOBIN g/dL 13.0   HEMATOCRIT % 39.9   PLATELETS Thousands/uL 180   NEUTROS PCT % 63   LYMPHS PCT % 21   MONOS PCT % 11   EOS PCT % 4     Results from last 7 days   Lab Units 03/10/24  1536   SODIUM mmol/L 140   POTASSIUM mmol/L 4.0   CHLORIDE mmol/L 103   CO2 mmol/L 31   BUN mg/dL 13   CREATININE mg/dL 1.01   ANION GAP mmol/L 6   CALCIUM mg/dL 9.5   ALBUMIN g/dL 4.2   TOTAL BILIRUBIN mg/dL 5.96*   ALK PHOS U/L 416*   ALT U/L 1,060*   AST U/L 491*   GLUCOSE RANDOM mg/dL 131     Results from last 7 days   Lab Units 03/10/24  1536   INR  0.88                   Lines/Drains:  Invasive Devices       Peripheral Intravenous Line  Duration             Peripheral IV 03/10/24 Left Antecubital <1 day                        Imaging: Reviewed radiology reports from this admission including: abdominal/pelvic CT  No orders to display       EKG and Other Studies Reviewed on Admission:   EKG: No EKG obtained.    ** Please Note: This note has been constructed using a voice recognition system. **

## 2024-03-11 NOTE — ASSESSMENT & PLAN NOTE
"Presented with worsening abdominal pain with associated darker urine.  Noted to have elevated LFTs  CT abdomen noted for \"Ill-defined 1.5 x 1.2 x 2.1 cm heterogeneously enhancing hypodense mass in the pancreatic head/uncinate process region noted with associated biliary and pancreatic ductal dilatation with few nonspecific subcentimeter upper abdominal peripancreatic mesenteric lymph nodes are seen\"    GI following, appreciate recommendations.  Pending ERCP and EUS  CT chest-without evidence of metastasis  Surgical oncology following, recommended repeating C-19-9 once jaundice has resolved  Pain management  Supportive care  Monitor hemodynamics  "

## 2024-03-11 NOTE — CONSULTS
Consultation - Surgical Oncology  Brenton Saez 63 y.o. male MRN: [id number]  Unit/Bed#: [facility] 927-01 Encounter: [id number]    Assessment/Plan     Assessment:  63-year-old Brenton Saez presents with 4 days of progressing abdominal pain and scleral icterus.  Patient has a past medical history of a radical prostatectomy.  Family history of pancreatic cancer.  Upon arrival to the ED patient mentioned rainer colored stool and dark urine.  Patient describes the abdominal pain as a crampy pain.  On physical exam patient was diffusely tender on the right side.  Patient did not have any gallbladder symptoms or appendix symptoms.  Patient has an elevated AST ALT alk phos.  Patient's T. bili was 5.67.  He has been chronically anemic.  CT of abdomen and pelvis was done showing normal liver size gallbladder was distended with 14 mm diameter diameter biliary tree.  Pancreas had an ill-defined 1.5 x 1.2 x 2.1 cm hypodense mass at the pancreatic head.    Plan:  Physical exam was notable for tenderness to palpation on the right side.  No masses or organomegaly were found.  -Plan to get biopsy of mass on pancreas.  -Order CT chest for mets.  -Follow-up outpatient with Dr. Jason for biopsy results of pancreas.    History of Present Illness     HPI:  Brenton Saez is a 63 y.o. male who presents with 4 days of progressing abdominal pain and scleral icterus.  Patient has a past medical history of radical prostatectomy done in 2020. Patient describes the abdominal pain as a dull pain that is increased with movement.  Patient denies any nausea vomiting fever headaches vision changes or heart palpitations.  Patient endorses increased loose stool and change of color to rainer colored. Patient endorses losing weight although trying to lose weight he went from 230 pounds to 205 pounds. Wife also noticed scleral yellowing which prompted to come to the ED.     Inpatient Consult to Surgical Oncology  Consult performed by: Ramón Ghotra  "PA-C  Consult ordered by: Lincoln Linn MD          Review of Systems    Historical Information   Past Medical History:   Diagnosis Date    Cancer (HCC)     prostate    Colon polyp     HL (hearing loss)     Murmur, heart     Obesity     Patient denies medical problems      Past Surgical History:   Procedure Laterality Date    APPENDECTOMY      COLONOSCOPY      CYST REMOVAL Right 2018    right index finger (knuckle)    HAND SURGERY      PROSTATECTOMY      TONSILLECTOMY       Social History   Social History     Substance and Sexual Activity   Alcohol Use Not Currently    Comment: 1 beer per month     Social History     Substance and Sexual Activity   Drug Use Never     E-Cigarette/Vaping    E-Cigarette Use Never User      E-Cigarette/Vaping Substances     Social History     Tobacco Use   Smoking Status Never    Passive exposure: Never   Smokeless Tobacco Never     Family History:   Family History   Problem Relation Age of Onset    Diabetes Mother     Arthritis Mother     Stroke Father     Diabetes Sister    Patient has an aunt who  of pancreatic cancer.    Meds/Allergies   current meds:   Current Facility-Administered Medications   Medication Dose Route Frequency    acetaminophen (TYLENOL) tablet 650 mg  650 mg Oral Q6H PRN    polyethylene glycol (MIRALAX) packet 17 g  17 g Oral Daily PRN     Allergies   Allergen Reactions    Penicillins Other (See Comments)     Unsure as a child       Objective   First Vitals:   Blood Pressure: 116/59 (24)  Pulse: 55 (24)  Temperature: 98 °F (36.7 °C) (24)  Temp Source: Oral (24)  Respirations: 18 (24)  Height: 5' 9.49\" (176.5 cm) (24)  Weight - Scale: 91.4 kg (201 lb 8 oz) (24)  SpO2: 95 % (24)    Current Vitals:   Blood Pressure: 118/63 (24)  Pulse: 60 (24)  Temperature: 98.5 °F (36.9 °C) (24)  Temp Source: Oral (24)  Respirations: 17 (24 " "0812)  Height: 5' 9.49\" (176.5 cm) (03/11/24 0730)  Weight - Scale: 91.4 kg (201 lb 8 oz) (03/11/24 0729)  SpO2: 98 % (03/11/24 0812)      Intake/Output Summary (Last 24 hours) at 3/11/2024 0907  Last data filed at 3/11/2024 0129  Gross per 24 hour   Intake 480 ml   Output --   Net 480 ml       Invasive Devices       Peripheral Intravenous Line  Duration             Peripheral IV 03/10/24 Left Antecubital <1 day                    Physical Exam  General - NAD, comfortable appearing  CV - RRR, no murmurs, heave or thrills, pulses normal in all limbs   Pulm - Clear to auscultation, no crackles wheezes or rhonchi  Abd - Normal bowel sounds. Tenderness to palpation diffusely on the right side. Negative murphys. Negative McBurney's. No masses or organomegaly were found.  Lab Results: CBC:   Lab Results   Component Value Date    WBC 6.12 03/10/2024    HGB 13.0 03/10/2024    HCT 39.9 03/10/2024    MCV 93 03/10/2024     03/10/2024    RBC 4.30 03/10/2024    MCH 30.2 03/10/2024    MCHC 32.6 03/10/2024    RDW 15.0 03/10/2024    MPV 12.8 (H) 03/10/2024    NRBC 0 03/10/2024   , CMP:   Lab Results   Component Value Date    SODIUM 140 03/10/2024    K 4.0 03/10/2024     03/10/2024    CO2 31 03/10/2024    BUN 13 03/10/2024    CREATININE 1.01 03/10/2024    CALCIUM 9.5 03/10/2024     (H) 03/11/2024     (H) 03/11/2024    ALKPHOS 412 (H) 03/11/2024    EGFR 79 03/10/2024   , Coagulation:   Lab Results   Component Value Date    INR 0.88 03/10/2024   , Urinalysis:   Lab Results   Component Value Date    COLORU Yellow 03/10/2024    CLARITYU Clear 03/10/2024    SPECGRAV <1.005 (L) 03/10/2024    PHUR 7.0 03/10/2024    LEUKOCYTESUR Negative 03/10/2024    NITRITE Negative 03/10/2024    GLUCOSEU Negative 03/10/2024    KETONESU Negative 03/10/2024    BILIRUBINUR Small (A) 03/10/2024    BLOODU Negative 03/10/2024   , Amylase: No results found for: \"AMYLASE\", Lipase:   Lab Results   Component Value Date    LIPASE 8 (L) " 03/10/2024     Imaging: I have personally reviewed pertinent reports.    EKG, Pathology, and Other Studies: I have personally reviewed pertinent reports.      Counseling / Coordination of Care  Total floor / unit time spent today 30 minutes.  Greater than 50% of total time was spent with the patient and / or family counseling and / or coordination of care.  A description of the counseling / coordination of care: HPI and physical exam.

## 2024-03-11 NOTE — ASSESSMENT & PLAN NOTE
Noted elevated ALT, AST, ALP and direct bilirubin in the settings of above.   Recent Labs     03/10/24  1536 03/11/24  0634   * 503*   ALT 1,060* 981*   ALKPHOS 416* 412*   TBILI 5.96* 5.67*   BILIDIR 4.02* 3.60*      Plan as above  Monitor LFTs  Hold statin, avoid hepatotoxic agents

## 2024-03-11 NOTE — PLAN OF CARE
Problem: PAIN - ADULT  Goal: Verbalizes/displays adequate comfort level or baseline comfort level  Description: Interventions:  - Encourage patient to monitor pain and request assistance  - Assess pain using appropriate pain scale  - Administer analgesics based on type and severity of pain and evaluate response  - Implement non-pharmacological measures as appropriate and evaluate response  - Consider cultural and social influences on pain and pain management  - Notify physician/advanced practitioner if interventions unsuccessful or patient reports new pain  Outcome: Progressing     Problem: SAFETY ADULT  Goal: Patient will remain free of falls  Description: INTERVENTIONS:  - Educate patient/family on patient safety including physical limitations  - Instruct patient to call for assistance with activity   - Consult OT/PT to assist with strengthening/mobility   - Keep Call bell within reach  - Keep bed low and locked with side rails adjusted as appropriate  - Keep care items and personal belongings within reach  - Initiate and maintain comfort rounds  - Make Fall Risk Sign visible to staff  - Offer Toileting every  Hours, in advance of need  - Initiate/Maintain alarm  - Obtain necessary fall risk management equipment:   - Apply yellow socks and bracelet for high fall risk patients  - Consider moving patient to room near nurses station  Outcome: Progressing     Problem: Knowledge Deficit  Goal: Patient/family/caregiver demonstrates understanding of disease process, treatment plan, medications, and discharge instructions  Description: Complete learning assessment and assess knowledge base.  Interventions:  - Provide teaching at level of understanding  - Provide teaching via preferred learning methods  Outcome: Progressing

## 2024-03-11 NOTE — UTILIZATION REVIEW
Initial Clinical Review    Admission: Date/Time/Statement:   Admission Orders (From admission, onward)       Ordered        03/10/24 2246  Inpatient Admission  Once                          Orders Placed This Encounter   Procedures    Inpatient Admission     Standing Status:   Standing     Number of Occurrences:   1     Order Specific Question:   Level of Care     Answer:   Med Surg [16]     Order Specific Question:   Estimated length of stay     Answer:   More than 2 Midnights     Order Specific Question:   Certification     Answer:   I certify that inpatient services are medically necessary for this patient for a duration of greater than two midnights. See H&P and MD Progress Notes for additional information about the patient's course of treatment.     Initial Presentation: 63 y.o. male with PMHx:  prostate cancer s/p radical prostatectomy and constipation, who presented initially to [facility] then transferred to [facility], admitted Inpatient status dt Pancreatic Mass.   Presented due to worsening abdominal pain and dark urine and jaundiced.He was noted to have elevated LFTs and subsequently underwent CT abdomen which demonstrated pancreatic head mass causing biliary and pancreatic ductal dilation.  Transferred to [facility] for ERCP/EUS.  Plan:  Admit to med surg, GI and Oncology consults, pain control, monitor LFTs, hold home statin.      Date: 3/11   Day 2:  Per GI: Plan for EUS/ERCP.  Timing to be determined based on GI lab availability.  Resume diet, keep empirically n.p.o. at midnight.  MiraLAX as needed for constipation.  Pain management per primary team.  Agree with CT chest for staging.  Check CEA, AFP, CA 19-9 for baseline.  Monitor LFTs daily.    3/11 Per Oncology: Patient describes the abdominal pain as a crampy pain.  On physical exam patient was diffusely tender on the right side.  Patient did not have any gallbladder symptoms or appendix symptoms.  Patient has an elevated AST ALT  alk phos.  Patient's T. bili was 5.67.  He has been chronically anemic.  CT of abdomen and pelvis was done showing normal liver size gallbladder was distended with 14 mm diameter diameter biliary tree.  Pancreas had an ill-defined 1.5 x 1.2 x 2.1 cm hypodense mass at the pancreatic head.  Plan:  Physical exam was notable for tenderness to palpation on the right side.  No masses or organomegaly were found.  -Plan to get biopsy of mass on pancreas.  -Order CT chest for mets.  -Follow-up outpatient with Dr. Jason for biopsy results of pancreas.     ED Triage Vitals   Temperature Pulse Respirations Blood Pressure SpO2   03/11/24 0200 03/11/24 0200 03/11/24 0200 03/11/24 0200 03/11/24 0200   98 °F (36.7 °C) 55 18 116/59 95 %      Temp Source Heart Rate Source Patient Position - Orthostatic VS BP Location FiO2 (%)   03/11/24 0729 03/11/24 0729 -- 03/11/24 0729 --   Oral Monitor  Right arm       Pain Score       03/10/24 2100       No Pain          Wt Readings from Last 1 Encounters:   03/11/24 91.4 kg (201 lb 8 oz)     Additional Vital Signs:   Date/Time Temp Pulse Resp BP MAP (mmHg) SpO2 O2 Device   03/11/24 08:12:29 98.5 °F (36.9 °C) 60 17 118/63 81 98 % --   03/11/24 0730 -- -- 18 -- -- -- --   03/11/24 02:00:18 98 °F (36.7 °C) 55 18 116/59 78 95 % --   03/11/24 0129 -- -- -- -- -- -- None (Room air)   03/10/24 2100 -- -- -- -- -- -- None (Room air)     Pertinent Labs/Diagnostic Test Results:   CT chest wo contrast   Final Result by Randall Crain DO (03/11 1109)      1. 1 mm left upper lobe nodule. Follow-up CT chest in 3 months recommended to assure stability and exclude developing early metastasis.      2. Persistent mild biliary and pancreatic duct dilatation due to suspected pancreatic head mass which is not well visualized on this study.      3. Mild coronary artery calcification in keeping with atherosclerotic heart disease.      The study was marked in EPIC for follow-up.      Workstation performed:  [id number]               Results from last 7 days   Lab Units 03/10/24  1536   WBC Thousand/uL 6.12   HEMOGLOBIN g/dL 13.0   HEMATOCRIT % 39.9   PLATELETS Thousands/uL 180   NEUTROS ABS Thousands/µL 3.87         Results from last 7 days   Lab Units 03/10/24  1536   SODIUM mmol/L 140   POTASSIUM mmol/L 4.0   CHLORIDE mmol/L 103   CO2 mmol/L 31   ANION GAP mmol/L 6   BUN mg/dL 13   CREATININE mg/dL 1.01   EGFR ml/min/1.73sq m 79   CALCIUM mg/dL 9.5     Results from last 7 days   Lab Units 03/11/24  0634 03/10/24  1536   AST U/L 503* 491*   ALT U/L 981* 1,060*   ALK PHOS U/L 412* 416*   TOTAL PROTEIN g/dL 5.6* 6.8   ALBUMIN g/dL 3.6 4.2   TOTAL BILIRUBIN mg/dL 5.67* 5.96*   BILIRUBIN DIRECT mg/dL 3.60* 4.02*         Results from last 7 days   Lab Units 03/10/24  1536   GLUCOSE RANDOM mg/dL 131     Results from last 7 days   Lab Units 03/10/24  1536   PROTIME seconds 12.3   INR  0.88   PTT seconds 25     Results from last 7 days   Lab Units 03/10/24  1649   HEP B S AG  Non-reactive   HEP C AB  Non-reactive   HEP B C IGM  Non-reactive     Results from last 7 days   Lab Units 03/10/24  1536   LIPASE u/L 8*     Results from last 7 days   Lab Units 03/10/24  1616   CLARITY UA  Clear   COLOR UA  Yellow   SPEC GRAV UA  <1.005*   PH UA  7.0   GLUCOSE UA mg/dl Negative   KETONES UA mg/dl Negative   BLOOD UA  Negative   PROTEIN UA mg/dl Negative   NITRITE UA  Negative   BILIRUBIN UA  Small*   UROBILINOGEN UA (BE) mg/dl <2.0   LEUKOCYTES UA  Negative       Past Medical History:   Diagnosis Date    Cancer (HCC)     prostate    Colon polyp     HL (hearing loss)     Murmur, heart     Obesity     Patient denies medical problems      Present on Admission:   Hyperlipidemia      Admitting Diagnosis: Elevated LFTs [R79.89]  Age/Sex: 63 y.o. male  Admission Orders:  Scheduled Medications:  polyethylene glycol, 17 g, Oral, Daily      Continuous IV Infusions: none     PRN Meds:  acetaminophen, 650 mg, Oral, Q6H PRN    scd    IP CONSULT TO  GASTROENTEROLOGY  IP CONSULT TO SURGICAL ONCOLOGY    Network Utilization Review Department  ATTENTION: Please call with any questions or concerns to [phone] and carefully listen to the prompts so that you are directed to the right person. All voicemails are confidential.   For Discharge needs, contact Care Management DC Support Team at [phone] opt. 2  Send all requests for admission clinical reviews, approved or denied determinations and any other requests to dedicated fax number below belonging to the campus where the patient is receiving treatment. List of dedicated fax numbers for the Facilities:  FACILITY NAME UR FAX NUMBER   ADMISSION DENIALS (Administrative/Medical Necessity) [phone]   DISCHARGE SUPPORT TEAM (NETWORK) [phone]   PARENT CHILD HEALTH (Maternity/NICU/Pediatrics) [phone]   [facility] [phone]   [facility] [phone]   [facility] [phone]   [facility] [phone]   [facility] [phone]   [facility] [phone]   [facility] [phone]   [facility] [phone]   [facility] [phone]   [facility] [phone]   [facility] [phone]   [facility] [phone]

## 2024-03-12 PROBLEM — K59.01 SLOW TRANSIT CONSTIPATION: Status: ACTIVE | Noted: 2024-03-10

## 2024-03-12 PROBLEM — R73.01 IFG (IMPAIRED FASTING GLUCOSE): Status: ACTIVE | Noted: 2024-03-12

## 2024-03-12 LAB
ALBUMIN SERPL BCG-MCNC: 3.9 G/DL (ref 3.5–5)
ALP SERPL-CCNC: 429 U/L (ref 34–104)
ALT SERPL W P-5'-P-CCNC: 1028 U/L (ref 7–52)
ANION GAP SERPL CALCULATED.3IONS-SCNC: 7 MMOL/L
AST SERPL W P-5'-P-CCNC: 463 U/L (ref 13–39)
BILIRUB SERPL-MCNC: 7.07 MG/DL (ref 0.2–1)
BUN SERPL-MCNC: 11 MG/DL (ref 5–25)
CALCIUM SERPL-MCNC: 9.2 MG/DL (ref 8.4–10.2)
CANCER AG19-9 SERPL-ACNC: 859 U/ML (ref 0–35)
CHLORIDE SERPL-SCNC: 106 MMOL/L (ref 96–108)
CO2 SERPL-SCNC: 26 MMOL/L (ref 21–32)
CREAT SERPL-MCNC: 0.9 MG/DL (ref 0.6–1.3)
ERYTHROCYTE [DISTWIDTH] IN BLOOD BY AUTOMATED COUNT: 15.6 % (ref 11.6–15.1)
GFR SERPL CREATININE-BSD FRML MDRD: 91 ML/MIN/1.73SQ M
GLUCOSE SERPL-MCNC: 105 MG/DL (ref 65–140)
HCT VFR BLD AUTO: 39.4 % (ref 36.5–49.3)
HGB BLD-MCNC: 12.8 G/DL (ref 12–17)
MCH RBC QN AUTO: 30.3 PG (ref 26.8–34.3)
MCHC RBC AUTO-ENTMCNC: 32.5 G/DL (ref 31.4–37.4)
MCV RBC AUTO: 93 FL (ref 82–98)
PLATELET # BLD AUTO: 148 THOUSANDS/UL (ref 149–390)
PMV BLD AUTO: 14.5 FL (ref 8.9–12.7)
POTASSIUM SERPL-SCNC: 4.3 MMOL/L (ref 3.5–5.3)
PROT SERPL-MCNC: 6.1 G/DL (ref 6.4–8.4)
RBC # BLD AUTO: 4.23 MILLION/UL (ref 3.88–5.62)
SODIUM SERPL-SCNC: 139 MMOL/L (ref 135–147)
WBC # BLD AUTO: 4.73 THOUSAND/UL (ref 4.31–10.16)

## 2024-03-12 PROCEDURE — 99232 SBSQ HOSP IP/OBS MODERATE 35: CPT | Performed by: SURGERY

## 2024-03-12 PROCEDURE — 99232 SBSQ HOSP IP/OBS MODERATE 35: CPT | Performed by: NURSE PRACTITIONER

## 2024-03-12 PROCEDURE — 85027 COMPLETE CBC AUTOMATED: CPT | Performed by: INTERNAL MEDICINE

## 2024-03-12 PROCEDURE — 80053 COMPREHEN METABOLIC PANEL: CPT | Performed by: INTERNAL MEDICINE

## 2024-03-12 RX ORDER — SENNOSIDES 8.6 MG
2 TABLET ORAL DAILY PRN
Status: DISCONTINUED | OUTPATIENT
Start: 2024-03-12 | End: 2024-03-14 | Stop reason: HOSPADM

## 2024-03-12 RX ADMIN — ACETAMINOPHEN 650 MG: 325 TABLET, FILM COATED ORAL at 21:34

## 2024-03-12 RX ADMIN — POLYETHYLENE GLYCOL 3350 17 G: 17 POWDER, FOR SOLUTION ORAL at 08:00

## 2024-03-12 NOTE — ASSESSMENT & PLAN NOTE
Noted on CT   Pt reports he was following outpatient with GI for constipation  C/w bowel regimen- reports now typically having three BMs a day

## 2024-03-12 NOTE — PLAN OF CARE
Problem: PAIN - ADULT  Goal: Verbalizes/displays adequate comfort level or baseline comfort level  Description: Interventions:  - Encourage patient to monitor pain and request assistance  - Assess pain using appropriate pain scale  - Administer analgesics based on type and severity of pain and evaluate response  - Implement non-pharmacological measures as appropriate and evaluate response  - Consider cultural and social influences on pain and pain management  - Notify physician/advanced practitioner if interventions unsuccessful or patient reports new pain  Outcome: Progressing     Problem: INFECTION - ADULT  Goal: Absence or prevention of progression during hospitalization  Description: INTERVENTIONS:  - Assess and monitor for signs and symptoms of infection  - Monitor lab/diagnostic results  - Monitor all insertion sites, i.e. indwelling lines, tubes, and drains  - Monitor endotracheal if appropriate and nasal secretions for changes in amount and color  - [place] appropriate cooling/warming therapies per order  - Administer medications as ordered  - Instruct and encourage patient and family to use good hand hygiene technique  - Identify and instruct in appropriate isolation precautions for identified infection/condition  Outcome: Progressing  Goal: Absence of fever/infection during neutropenic period  Description: INTERVENTIONS:  - Monitor WBC    Outcome: Progressing     Problem: SAFETY ADULT  Goal: Patient will remain free of falls  Description: INTERVENTIONS:  - Educate patient/family on patient safety including physical limitations  - Instruct patient to call for assistance with activity   - Consult OT/PT to assist with strengthening/mobility   - Keep Call bell within reach  - Keep bed low and locked with side rails adjusted as appropriate  - Keep care items and personal belongings within reach  - Initiate and maintain comfort rounds  - Apply yellow socks and bracelet for high fall risk patients  - Consider  moving patient to room near nurses station  Outcome: Progressing  Goal: Maintain or return to baseline ADL function  Description: INTERVENTIONS:  -  Assess patient's ability to carry out ADLs; assess patient's baseline for ADL function and identify physical deficits which impact ability to perform ADLs (bathing, care of mouth/teeth, toileting, grooming, dressing, etc.)  - Assess/evaluate cause of self-care deficits   - Assess range of motion  - Assess patient's mobility; develop plan if impaired  - Assess patient's need for assistive devices and provide as appropriate  - Encourage maximum independence but intervene and supervise when necessary  - Involve family in performance of ADLs  - Assess for home care needs following discharge   - Consider OT consult to assist with ADL evaluation and planning for discharge  - Provide patient education as appropriate  Outcome: Progressing  Goal: Maintains/Returns to pre admission functional level  Description: INTERVENTIONS:  - Perform AM-PAC 6 Click Basic Mobility/ Daily Activity assessment daily.  - Set and communicate daily mobility goal to care team and patient/family/caregiver.   - Collaborate with rehabilitation services on mobility goals if consulted  - Out of bed for toileting  - Record patient progress and toleration of activity level   Outcome: Progressing     Problem: DISCHARGE PLANNING  Goal: Discharge to home or other facility with appropriate resources  Description: INTERVENTIONS:  - Identify barriers to discharge w/patient and caregiver  - Arrange for needed discharge resources and transportation as appropriate  - Identify discharge learning needs (meds, wound care, etc.)  - Arrange for interpretive services to assist at discharge as needed  - Refer to Case Management Department for coordinating discharge planning if the patient needs post-hospital services based on physician/advanced practitioner order or complex needs related to functional status, cognitive  ability, or social support system  Outcome: Progressing     Problem: Knowledge Deficit  Goal: Patient/family/caregiver demonstrates understanding of disease process, treatment plan, medications, and discharge instructions  Description: Complete learning assessment and assess knowledge base.  Interventions:  - Provide teaching at level of understanding  - Provide teaching via preferred learning methods  Outcome: Progressing     Problem: GASTROINTESTINAL - ADULT  Goal: Minimal or absence of nausea and/or vomiting  Description: INTERVENTIONS:  - Administer IV fluids if ordered to ensure adequate hydration  - Maintain NPO status until nausea and vomiting are resolved  - Nasogastric tube if ordered  - Administer ordered antiemetic medications as needed  - Provide nonpharmacologic comfort measures as appropriate  - Advance diet as tolerated, if ordered  - Consider nutrition services referral to assist patient with adequate nutrition and appropriate food choices  Outcome: Progressing  Goal: Maintains or returns to baseline bowel function  Description: INTERVENTIONS:  - Assess bowel function  - Encourage oral fluids to ensure adequate hydration  - Administer IV fluids if ordered to ensure adequate hydration  - Administer ordered medications as needed  - Encourage mobilization and activity  - Consider nutritional services referral to assist patient with adequate nutrition and appropriate food choices  Outcome: Progressing  Goal: Maintains adequate nutritional intake  Description: INTERVENTIONS:  - Monitor percentage of each meal consumed  - Identify factors contributing to decreased intake, treat as appropriate  - Assist with meals as needed  - Monitor I&O, weight, and lab values if indicated  - Obtain nutrition services referral as needed  Outcome: Progressing  Goal: Establish and maintain optimal ostomy function  Description: INTERVENTIONS:  - Assess bowel function  - Encourage oral fluids to ensure adequate hydration  -  Administer IV fluids if ordered to ensure adequate hydration   - Administer ordered medications as needed  - Encourage mobilization and activity  - Nutrition services referral to assist patient with appropriate food choices  - Assess stoma site  - Consider wound care consult   Outcome: Progressing  Goal: Oral mucous membranes remain intact  Description: INTERVENTIONS  - Assess oral mucosa and hygiene practices  - Implement preventative oral hygiene regimen  - Implement oral medicated treatments as ordered  - Initiate Nutrition services referral as needed  Outcome: Progressing

## 2024-03-12 NOTE — ASSESSMENT & PLAN NOTE
"Presented with worsening abdominal pain and darker urine.  Noted to have elevated LFTs  CT abdomen noted for \"Ill-defined 1.5 x 1.2 x 2.1 cm heterogeneously enhancing hypodense mass in the pancreatic head/uncinate process region noted with associated biliary and pancreatic ductal dilatation with few nonspecific subcentimeter upper abdominal peripancreatic mesenteric lymph nodes are seen\"  GI following, appreciate recommendations.    Pending ERCP and EUS 3/13  CT chest- noting small 1mm Left upper lobe nodule- Follow-up CT chest in 3 months recommended to assure stability and exclude developing early metastasis.   Surgical oncology following, recommended repeating C-19-9 once jaundice has resolved  CA 19-9: 859; CEA 3.3; AFP 2.57   Pain management  Supportive care  Monitor hemodynamics  "

## 2024-03-12 NOTE — ASSESSMENT & PLAN NOTE
S/p radical prostatectomy by Dr. Martinez in July of 2021 with Ale 7A pathology.   Last seen at [facility] 10/23  PSA is unchanged at 0.011   Recommended by urology to have a PSA in 6 months outpatient

## 2024-03-12 NOTE — ASSESSMENT & PLAN NOTE
Noted elevated ALT, AST, ALP and direct bilirubin in the settings of above.   Recent Labs     03/10/24  1536 03/11/24  0634 03/12/24  0450   * 503* 463*   ALT 1,060* 981* 1,028*   ALKPHOS 416* 412* 429*   TBILI 5.96* 5.67* 7.07*   BILIDIR 4.02* 3.60*  --    Plan as above  Monitor LFTs  Hold statin, avoid hepatotoxic agents

## 2024-03-12 NOTE — PROGRESS NOTES
"[facility]  Progress Note  Name: Brenton Saez I  MRN: [id number]  Unit/Bed#: [facility] 927-01 I Date of Admission: 3/10/2024   Date of Service: 3/12/2024 I Hospital Day: 2    Assessment/Plan   * Pancreatic mass concerning for pancreatic CA  Assessment & Plan  Presented with worsening abdominal pain and darker urine.  Noted to have elevated LFTs  CT abdomen noted for \"Ill-defined 1.5 x 1.2 x 2.1 cm heterogeneously enhancing hypodense mass in the pancreatic head/uncinate process region noted with associated biliary and pancreatic ductal dilatation with few nonspecific subcentimeter upper abdominal peripancreatic mesenteric lymph nodes are seen\"  GI following, appreciate recommendations.    Pending ERCP and EUS 3/13  CT chest- noting small 1mm Left upper lobe nodule- Follow-up CT chest in 3 months recommended to assure stability and exclude developing early metastasis.   Surgical oncology following, recommended repeating C-19-9 once jaundice has resolved  CA 19-9: 859; CEA 3.3; AFP 2.57   Pain management  Supportive care  Monitor hemodynamics    LFT elevation  Assessment & Plan  Noted elevated ALT, AST, ALP and direct bilirubin in the settings of above.   Recent Labs     03/10/24  1536 03/11/24  0634 03/12/24  0450   * 503* 463*   ALT 1,060* 981* 1,028*   ALKPHOS 416* 412* 429*   TBILI 5.96* 5.67* 7.07*   BILIDIR 4.02* 3.60*  --    Plan as above  Monitor LFTs  Hold statin, avoid hepatotoxic agents    IFG (impaired fasting glucose)  Assessment & Plan  A1c 6.2  Pt reports exercising at home and was attempting to lose weight over the past 6 months. He has lost 30lbs overall     Malignant neoplasm of prostate (HCC)  Assessment & Plan  S/p radical prostatectomy by Dr. Martinez in July of 2021 with [place] 7A pathology.   Last seen at [facility] 10/23  PSA is unchanged at 0.011   Recommended by urology to have a PSA in 6 months outpatient    Slow transit " constipation  Assessment & Plan  Noted on CT   Pt reports he was following outpatient with GI for constipation  C/w bowel regimen- reports now typically having three BMs a day     Hyperlipidemia  Assessment & Plan  Hold statin.     Heart valve disease  Assessment & Plan  Noted on ECHO in 2021   MV, TV & PV trace regurg; MV mild calcification. Mild AV sclerosis   Recommend f/u outpatient with updated echo              VTE Pharmacologic Prophylaxis: VTE Score: 3 Moderate Risk (Score 3-4) - Pharmacological DVT Prophylaxis Contraindicated. Sequential Compression Devices Ordered.    Mobility:   Basic Mobility Inpatient Raw Score: 24  JH-HLM Goal: 8: Walk 250 feet or more  JH-HLM Achieved: 8: Walk 250 feet ot more  HLM Goal achieved. Continue to encourage appropriate mobility.    Patient Centered Rounds: I performed bedside rounds with nursing staff today.   Discussions with Specialists or Other Care Team Provider: d/w RN     Education and Discussions with Family / Patient: Patient declined call to .     Total Time Spent on Date of Encounter in care of patient: 35 mins. This time was spent on one or more of the following: performing physical exam; counseling and coordination of care; obtaining or reviewing history; documenting in the medical record; reviewing/ordering tests, medications or procedures; communicating with other healthcare professionals and discussing with patient's family/caregivers.    Current Length of Stay: 2 day(s)  Current Patient Status: Inpatient   Certification Statement: The patient will continue to require additional inpatient hospital stay due to procedures in am   Discharge Plan: Anticipate discharge in 24-48 hrs to home.    Code Status: Level 1 - Full Code    Subjective:   Patient sitting in the chair.  Denies any current complaints reports that overall he has been feeling well denies having some mild abdominal discomfort in the right upper quadrant.    Objective:     Vitals:    Temp (24hrs), Av.3 °F (36.8 °C), Min:98.1 °F (36.7 °C), Max:98.4 °F (36.9 °C)    Temp:  [98.1 °F (36.7 °C)-98.4 °F (36.9 °C)] 98.4 °F (36.9 °C)  HR:  [55-64] 64  Resp:  [15-18] 18  BP: (114-122)/(62-73) 118/73  SpO2:  [96 %-97 %] 97 %  Body mass index is 29.34 kg/m².     Input and Output Summary (last 24 hours):   No intake or output data in the 24 hours ending 24 1140    Physical Exam:   Physical Exam  Constitutional:       General: He is not in acute distress.     Appearance: He is not ill-appearing.   Eyes:      General: Scleral icterus present.   Cardiovascular:      Rate and Rhythm: Normal rate and regular rhythm.      Pulses: Normal pulses.      Heart sounds: Murmur heard.   Pulmonary:      Effort: No respiratory distress.      Breath sounds: Normal breath sounds. No wheezing or rales.   Abdominal:      General: Bowel sounds are normal. There is no distension.      Palpations: Abdomen is soft.      Tenderness: There is no abdominal tenderness.   Musculoskeletal:         General: No swelling or tenderness.   Skin:     General: Skin is warm and dry.      Coloration: Skin is jaundiced.      Findings: No erythema or rash.   Neurological:      General: No focal deficit present.      Mental Status: He is alert. Mental status is at baseline.   Psychiatric:         Attention and Perception: Attention normal.         Mood and Affect: Mood normal.          Additional Data:     Labs:  Results from last 7 days   Lab Units 24  0450 03/10/24  1536   WBC Thousand/uL 4.73 6.12   HEMOGLOBIN g/dL 12.8 13.0   HEMATOCRIT % 39.4 39.9   PLATELETS Thousands/uL 148* 180   NEUTROS PCT %  --  63   LYMPHS PCT %  --  21   MONOS PCT %  --  11   EOS PCT %  --  4     Results from last 7 days   Lab Units 24  0450   SODIUM mmol/L 139   POTASSIUM mmol/L 4.3   CHLORIDE mmol/L 106   CO2 mmol/L 26   BUN mg/dL 11   CREATININE mg/dL 0.90   ANION GAP mmol/L 7   CALCIUM mg/dL 9.2   ALBUMIN g/dL 3.9   TOTAL BILIRUBIN mg/dL  7.07*   ALK PHOS U/L 429*   ALT U/L 1,028*   AST U/L 463*   GLUCOSE RANDOM mg/dL 105     Results from last 7 days   Lab Units 03/10/24  1536   INR  0.88         Results from last 7 days   Lab Units 03/10/24  1536   HEMOGLOBIN A1C % 6.2*           Lines/Drains:  Invasive Devices       Peripheral Intravenous Line  Duration             Peripheral IV 03/10/24 Left Antecubital 1 day                          Imaging: Reviewed radiology reports from this admission including: chest CT scan and abdominal/pelvic CT    Recent Cultures (last 7 days):         Last 24 Hours Medication List:   Current Facility-Administered Medications   Medication Dose Route Frequency Provider Last Rate    acetaminophen  650 mg Oral Q6H PRN Lincoln Linn MD      polyethylene glycol  17 g Oral Daily Orlando Jakcson DO          Today, Patient Was Seen By: CARLOS De León    **Please Note: This note may have been constructed using a voice recognition system.**

## 2024-03-12 NOTE — ASSESSMENT & PLAN NOTE
Noted on ECHO in 2021   MV, TV & PV trace regurg; MV mild calcification. Mild AV sclerosis   Recommend f/u outpatient with updated echo

## 2024-03-12 NOTE — PLAN OF CARE
Problem: PAIN - ADULT  Goal: Verbalizes/displays adequate comfort level or baseline comfort level  Description: Interventions:  - Encourage patient to monitor pain and request assistance  - Assess pain using appropriate pain scale  - Administer analgesics based on type and severity of pain and evaluate response  - Implement non-pharmacological measures as appropriate and evaluate response  - Consider cultural and social influences on pain and pain management  - Notify physician/advanced practitioner if interventions unsuccessful or patient reports new pain  Outcome: Progressing     Problem: GASTROINTESTINAL - ADULT  Goal: Minimal or absence of nausea and/or vomiting  Description: INTERVENTIONS:  - Administer IV fluids if ordered to ensure adequate hydration  - Maintain NPO status until nausea and vomiting are resolved  - Nasogastric tube if ordered  - Administer ordered antiemetic medications as needed  - Provide nonpharmacologic comfort measures as appropriate  - Advance diet as tolerated, if ordered  - Consider nutrition services referral to assist patient with adequate nutrition and appropriate food choices  Outcome: Progressing     Problem: GASTROINTESTINAL - ADULT  Goal: Maintains or returns to baseline bowel function  Description: INTERVENTIONS:  - Assess bowel function  - Encourage oral fluids to ensure adequate hydration  - Administer IV fluids if ordered to ensure adequate hydration  - Administer ordered medications as needed  - Encourage mobilization and activity  - Consider nutritional services referral to assist patient with adequate nutrition and appropriate food choices  Outcome: Progressing     Problem: GASTROINTESTINAL - ADULT  Goal: Maintains adequate nutritional intake  Description: INTERVENTIONS:  - Monitor percentage of each meal consumed  - Identify factors contributing to decreased intake, treat as appropriate  - Assist with meals as needed  - Monitor I&O, weight, and lab values if indicated  -  Obtain nutrition services referral as needed  Outcome: Progressing

## 2024-03-12 NOTE — ASSESSMENT & PLAN NOTE
A1c 6.2  Pt reports exercising at home and was attempting to lose weight over the past 6 months. He has lost 30lbs overall

## 2024-03-12 NOTE — PROGRESS NOTES
"Progress Note - Oncologic Surgery   Brenton Saez 63 y.o. male MRN: [id number]  Unit/Bed#: [facility] 927-01 Encounter: [id number]    Assessment:  63-year-old male with 4 days of progressive abdominal pain and scleral icterus, found to have elevated LFT, hyperbilirubinemia chronic anemia and noted to have an ill-defined mass of the pancreatic head    Plan:  -Regular diet, n.p.o. at midnight  -Follow-up for EGD/EUS biopsy planned for 3/13  -Follow-up tumor markers once hyperbilirubinemia has resolved  -F/U CA 19-9  -Encourage out of bed and ambulation  -Analgesia and antiemetics as needed  -Encourage IS use      Subjective/Objective     Subjective: Patient denies any nausea or vomiting.  Reports bowel function.  Tolerating diet.  Reports minor epigastric dull aches.    Objective: AVSS on room air    Blood pressure 122/71, pulse 55, temperature 98.4 °F (36.9 °C), resp. rate 18, height 5' 9.49\" (1.765 m), weight 91.4 kg (201 lb 8 oz), SpO2 96%.,Body mass index is 29.34 kg/m².    UOP: X 1 unmeasured      Invasive Devices       Peripheral Intravenous Line  Duration             Peripheral IV 03/10/24 Left Antecubital 1 day                    Physical Exam:  General: No acute distress, alert and oriented  CV: RRR  Lungs: Normal work of breathing   Abdomen: Soft, nondistended, nontender  Skin: Warm, dry    Lab, Imaging and other studies:  Recent Labs     03/10/24  1536 03/11/24  0634   WBC 6.12  --    HGB 13.0  --      --    SODIUM 140  --    K 4.0  --      --    CO2 31  --    BUN 13  --    CREATININE 1.01  --    GLUC 131  --    CALCIUM 9.5  --    * 503*   ALT 1,060* 981*   ALKPHOS 416* 412*   TBILI 5.96* 5.67*       "

## 2024-03-13 ENCOUNTER — ANESTHESIA (INPATIENT)
Dept: GASTROENTEROLOGY | Facility: HOSPITAL | Age: 64
DRG: 722 | End: 2024-03-13
Payer: COMMERCIAL

## 2024-03-13 ENCOUNTER — APPOINTMENT (INPATIENT)
Dept: GASTROENTEROLOGY | Facility: HOSPITAL | Age: 64
DRG: 722 | End: 2024-03-13
Payer: COMMERCIAL

## 2024-03-13 ENCOUNTER — APPOINTMENT (OUTPATIENT)
Dept: RADIOLOGY | Facility: HOSPITAL | Age: 64
DRG: 722 | End: 2024-03-13
Payer: COMMERCIAL

## 2024-03-13 ENCOUNTER — ANESTHESIA EVENT (INPATIENT)
Dept: GASTROENTEROLOGY | Facility: HOSPITAL | Age: 64
DRG: 722 | End: 2024-03-13
Payer: COMMERCIAL

## 2024-03-13 LAB
ALBUMIN SERPL BCG-MCNC: 4.1 G/DL (ref 3.5–5)
ALP SERPL-CCNC: 453 U/L (ref 34–104)
ALT SERPL W P-5'-P-CCNC: 1013 U/L (ref 7–52)
ANION GAP SERPL CALCULATED.3IONS-SCNC: 9 MMOL/L (ref 4–13)
AST SERPL W P-5'-P-CCNC: 524 U/L (ref 13–39)
BILIRUB DIRECT SERPL-MCNC: 4.8 MG/DL (ref 0–0.2)
BILIRUB SERPL-MCNC: 8.09 MG/DL (ref 0.2–1)
BUN SERPL-MCNC: 12 MG/DL (ref 5–25)
CALCIUM SERPL-MCNC: 9.3 MG/DL (ref 8.4–10.2)
CHLORIDE SERPL-SCNC: 105 MMOL/L (ref 96–108)
CO2 SERPL-SCNC: 26 MMOL/L (ref 21–32)
CREAT SERPL-MCNC: 0.93 MG/DL (ref 0.6–1.3)
ERYTHROCYTE [DISTWIDTH] IN BLOOD BY AUTOMATED COUNT: 15.6 % (ref 11.6–15.1)
GFR SERPL CREATININE-BSD FRML MDRD: 87 ML/MIN/1.73SQ M
GLUCOSE SERPL-MCNC: 112 MG/DL (ref 65–140)
HCT VFR BLD AUTO: 38.7 % (ref 36.5–49.3)
HGB BLD-MCNC: 13.1 G/DL (ref 12–17)
MCH RBC QN AUTO: 30.5 PG (ref 26.8–34.3)
MCHC RBC AUTO-ENTMCNC: 33.9 G/DL (ref 31.4–37.4)
MCV RBC AUTO: 90 FL (ref 82–98)
PLATELET # BLD AUTO: 179 THOUSANDS/UL (ref 149–390)
PMV BLD AUTO: 13.5 FL (ref 8.9–12.7)
POTASSIUM SERPL-SCNC: 4.4 MMOL/L (ref 3.5–5.3)
PROT SERPL-MCNC: 6.4 G/DL (ref 6.4–8.4)
RBC # BLD AUTO: 4.29 MILLION/UL (ref 3.88–5.62)
SODIUM SERPL-SCNC: 140 MMOL/L (ref 135–147)
WBC # BLD AUTO: 4.75 THOUSAND/UL (ref 4.31–10.16)

## 2024-03-13 PROCEDURE — 0FBG8ZX EXCISION OF PANCREAS, VIA NATURAL OR ARTIFICIAL OPENING ENDOSCOPIC, DIAGNOSTIC: ICD-10-PCS | Performed by: INTERNAL MEDICINE

## 2024-03-13 PROCEDURE — 0DJ08ZZ INSPECTION OF UPPER INTESTINAL TRACT, VIA NATURAL OR ARTIFICIAL OPENING ENDOSCOPIC: ICD-10-PCS | Performed by: INTERNAL MEDICINE

## 2024-03-13 PROCEDURE — 43274 ERCP DUCT STENT PLACEMENT: CPT | Performed by: INTERNAL MEDICINE

## 2024-03-13 PROCEDURE — C1769 GUIDE WIRE: HCPCS

## 2024-03-13 PROCEDURE — 0F798DZ DILATION OF COMMON BILE DUCT WITH INTRALUMINAL DEVICE, VIA NATURAL OR ARTIFICIAL OPENING ENDOSCOPIC: ICD-10-PCS | Performed by: INTERNAL MEDICINE

## 2024-03-13 PROCEDURE — 99232 SBSQ HOSP IP/OBS MODERATE 35: CPT | Performed by: SURGERY

## 2024-03-13 PROCEDURE — 88172 CYTP DX EVAL FNA 1ST EA SITE: CPT | Performed by: PATHOLOGY

## 2024-03-13 PROCEDURE — C2617 STENT, NON-COR, TEM W/O DEL: HCPCS

## 2024-03-13 PROCEDURE — 43264 ERCP REMOVE DUCT CALCULI: CPT | Performed by: INTERNAL MEDICINE

## 2024-03-13 PROCEDURE — 80048 BASIC METABOLIC PNL TOTAL CA: CPT | Performed by: NURSE PRACTITIONER

## 2024-03-13 PROCEDURE — 99232 SBSQ HOSP IP/OBS MODERATE 35: CPT | Performed by: PHYSICIAN ASSISTANT

## 2024-03-13 PROCEDURE — 74330 X-RAY BILE/PANC ENDOSCOPY: CPT

## 2024-03-13 PROCEDURE — 88305 TISSUE EXAM BY PATHOLOGIST: CPT | Performed by: PATHOLOGY

## 2024-03-13 PROCEDURE — C1874 STENT, COATED/COV W/DEL SYS: HCPCS

## 2024-03-13 PROCEDURE — 80076 HEPATIC FUNCTION PANEL: CPT | Performed by: NURSE PRACTITIONER

## 2024-03-13 PROCEDURE — C1889 IMPLANT/INSERT DEVICE, NOC: HCPCS

## 2024-03-13 PROCEDURE — 43242 EGD US FINE NEEDLE BX/ASPIR: CPT | Performed by: INTERNAL MEDICINE

## 2024-03-13 PROCEDURE — 88160 CYTOPATH SMEAR OTHER SOURCE: CPT | Performed by: PATHOLOGY

## 2024-03-13 PROCEDURE — 85027 COMPLETE CBC AUTOMATED: CPT | Performed by: NURSE PRACTITIONER

## 2024-03-13 RX ORDER — SODIUM CHLORIDE 9 MG/ML
INJECTION, SOLUTION INTRAVENOUS CONTINUOUS PRN
Status: DISCONTINUED | OUTPATIENT
Start: 2024-03-13 | End: 2024-03-13

## 2024-03-13 RX ORDER — ONDANSETRON 2 MG/ML
INJECTION INTRAMUSCULAR; INTRAVENOUS AS NEEDED
Status: DISCONTINUED | OUTPATIENT
Start: 2024-03-13 | End: 2024-03-13

## 2024-03-13 RX ORDER — LIDOCAINE HYDROCHLORIDE 20 MG/ML
INJECTION, SOLUTION EPIDURAL; INFILTRATION; INTRACAUDAL; PERINEURAL AS NEEDED
Status: DISCONTINUED | OUTPATIENT
Start: 2024-03-13 | End: 2024-03-13

## 2024-03-13 RX ORDER — CEFAZOLIN SODIUM 1 G/3ML
INJECTION, POWDER, FOR SOLUTION INTRAMUSCULAR; INTRAVENOUS AS NEEDED
Status: DISCONTINUED | OUTPATIENT
Start: 2024-03-13 | End: 2024-03-13

## 2024-03-13 RX ORDER — SODIUM CHLORIDE, SODIUM LACTATE, POTASSIUM CHLORIDE, CALCIUM CHLORIDE 600; 310; 30; 20 MG/100ML; MG/100ML; MG/100ML; MG/100ML
INJECTION, SOLUTION INTRAVENOUS CONTINUOUS PRN
Status: DISCONTINUED | OUTPATIENT
Start: 2024-03-13 | End: 2024-03-13

## 2024-03-13 RX ORDER — SUCCINYLCHOLINE/SOD CL,ISO/PF 100 MG/5ML
SYRINGE (ML) INTRAVENOUS AS NEEDED
Status: DISCONTINUED | OUTPATIENT
Start: 2024-03-13 | End: 2024-03-13

## 2024-03-13 RX ORDER — GLYCOPYRROLATE 0.2 MG/ML
INJECTION INTRAMUSCULAR; INTRAVENOUS AS NEEDED
Status: DISCONTINUED | OUTPATIENT
Start: 2024-03-13 | End: 2024-03-13

## 2024-03-13 RX ORDER — FENTANYL CITRATE 50 UG/ML
INJECTION, SOLUTION INTRAMUSCULAR; INTRAVENOUS AS NEEDED
Status: DISCONTINUED | OUTPATIENT
Start: 2024-03-13 | End: 2024-03-13

## 2024-03-13 RX ORDER — INDOMETHACIN 50 MG/1
SUPPOSITORY RECTAL AS NEEDED
Status: COMPLETED | OUTPATIENT
Start: 2024-03-13 | End: 2024-03-13

## 2024-03-13 RX ORDER — EPHEDRINE SULFATE 50 MG/ML
INJECTION INTRAVENOUS AS NEEDED
Status: DISCONTINUED | OUTPATIENT
Start: 2024-03-13 | End: 2024-03-13

## 2024-03-13 RX ORDER — DEXAMETHASONE SODIUM PHOSPHATE 10 MG/ML
INJECTION, SOLUTION INTRAMUSCULAR; INTRAVENOUS AS NEEDED
Status: DISCONTINUED | OUTPATIENT
Start: 2024-03-13 | End: 2024-03-13

## 2024-03-13 RX ORDER — PROPOFOL 10 MG/ML
INJECTION, EMULSION INTRAVENOUS AS NEEDED
Status: DISCONTINUED | OUTPATIENT
Start: 2024-03-13 | End: 2024-03-13

## 2024-03-13 RX ADMIN — SODIUM CHLORIDE: 0.9 INJECTION, SOLUTION INTRAVENOUS at 16:29

## 2024-03-13 RX ADMIN — EPHEDRINE SULFATE 10 MG: 50 INJECTION, SOLUTION INTRAVENOUS at 15:48

## 2024-03-13 RX ADMIN — Medication 100 MG: at 15:33

## 2024-03-13 RX ADMIN — GLYCOPYRROLATE 0.1 MG: 0.2 INJECTION, SOLUTION INTRAMUSCULAR; INTRAVENOUS at 15:45

## 2024-03-13 RX ADMIN — PHENYLEPHRINE HYDROCHLORIDE 100 MCG: 10 INJECTION INTRAVENOUS at 15:58

## 2024-03-13 RX ADMIN — SODIUM CHLORIDE, SODIUM LACTATE, POTASSIUM CHLORIDE, AND CALCIUM CHLORIDE: .6; .31; .03; .02 INJECTION, SOLUTION INTRAVENOUS at 15:23

## 2024-03-13 RX ADMIN — PROPOFOL 200 MG: 10 INJECTION, EMULSION INTRAVENOUS at 15:33

## 2024-03-13 RX ADMIN — PROPOFOL 50 MG: 10 INJECTION, EMULSION INTRAVENOUS at 16:14

## 2024-03-13 RX ADMIN — CEFAZOLIN 2000 MG: 1 INJECTION, POWDER, FOR SOLUTION INTRAMUSCULAR; INTRAVENOUS at 16:26

## 2024-03-13 RX ADMIN — ACETAMINOPHEN 650 MG: 325 TABLET, FILM COATED ORAL at 20:01

## 2024-03-13 RX ADMIN — FENTANYL CITRATE 50 MCG: 50 INJECTION INTRAMUSCULAR; INTRAVENOUS at 15:44

## 2024-03-13 RX ADMIN — IOHEXOL 16 ML: 240 INJECTION, SOLUTION INTRATHECAL; INTRAVASCULAR; INTRAVENOUS; ORAL at 16:40

## 2024-03-13 RX ADMIN — GLYCOPYRROLATE 0.1 MG: 0.2 INJECTION, SOLUTION INTRAMUSCULAR; INTRAVENOUS at 15:46

## 2024-03-13 RX ADMIN — DEXAMETHASONE SODIUM PHOSPHATE 10 MG: 10 INJECTION, SOLUTION INTRAMUSCULAR; INTRAVENOUS at 15:33

## 2024-03-13 RX ADMIN — INDOMETHACIN 100 MG: 50 SUPPOSITORY RECTAL at 16:12

## 2024-03-13 RX ADMIN — ONDANSETRON 4 MG: 2 INJECTION INTRAMUSCULAR; INTRAVENOUS at 16:18

## 2024-03-13 RX ADMIN — LIDOCAINE HYDROCHLORIDE 100 MG: 20 INJECTION, SOLUTION EPIDURAL; INFILTRATION; INTRACAUDAL; PERINEURAL at 15:33

## 2024-03-13 RX ADMIN — PROPOFOL 50 MG: 10 INJECTION, EMULSION INTRAVENOUS at 15:39

## 2024-03-13 RX ADMIN — FENTANYL CITRATE 50 MCG: 50 INJECTION INTRAMUSCULAR; INTRAVENOUS at 15:32

## 2024-03-13 NOTE — PLAN OF CARE
Problem: PAIN - ADULT  Goal: Verbalizes/displays adequate comfort level or baseline comfort level  Description: Interventions:  - Encourage patient to monitor pain and request assistance  - Assess pain using appropriate pain scale  - Administer analgesics based on type and severity of pain and evaluate response  - Implement non-pharmacological measures as appropriate and evaluate response  - Consider cultural and social influences on pain and pain management  - Notify physician/advanced practitioner if interventions unsuccessful or patient reports new pain  Outcome: Progressing     Problem: INFECTION - ADULT  Goal: Absence or prevention of progression during hospitalization  Description: INTERVENTIONS:  - Assess and monitor for signs and symptoms of infection  - Monitor lab/diagnostic results  - Monitor all insertion sites, i.e. indwelling lines, tubes, and drains  - Monitor endotracheal if appropriate and nasal secretions for changes in amount and color  - [place] appropriate cooling/warming therapies per order  - Administer medications as ordered  - Instruct and encourage patient and family to use good hand hygiene technique  - Identify and instruct in appropriate isolation precautions for identified infection/condition  Outcome: Progressing  Goal: Absence of fever/infection during neutropenic period  Description: INTERVENTIONS:  - Monitor WBC    Outcome: Progressing     Problem: SAFETY ADULT  Goal: Patient will remain free of falls  Description: INTERVENTIONS:  - Educate patient/family on patient safety including physical limitations  - Instruct patient to call for assistance with activity   - Consult OT/PT to assist with strengthening/mobility   - Keep Call bell within reach  - Keep bed low and locked with side rails adjusted as appropriate  - Keep care items and personal belongings within reach  - Initiate and maintain comfort rounds  - Make Fall Risk Sign visible to staff  - Apply yellow socks and bracelet  for high fall risk patients  - Consider moving patient to room near nurses station  Outcome: Progressing  Goal: Maintain or return to baseline ADL function  Description: INTERVENTIONS:  -  Assess patient's ability to carry out ADLs; assess patient's baseline for ADL function and identify physical deficits which impact ability to perform ADLs (bathing, care of mouth/teeth, toileting, grooming, dressing, etc.)  - Assess/evaluate cause of self-care deficits   - Assess range of motion  - Assess patient's mobility; develop plan if impaired  - Assess patient's need for assistive devices and provide as appropriate  - Encourage maximum independence but intervene and supervise when necessary  - Involve family in performance of ADLs  - Assess for home care needs following discharge   - Consider OT consult to assist with ADL evaluation and planning for discharge  - Provide patient education as appropriate  Outcome: Progressing  Goal: Maintains/Returns to pre admission functional level  Description: INTERVENTIONS:  - Perform AM-PAC 6 Click Basic Mobility/ Daily Activity assessment daily.  - Set and communicate daily mobility goal to care team and patient/family/caregiver.   - Collaborate with rehabilitation services on mobility goals if consulted  - Out of bed for toileting  - Record patient progress and toleration of activity level   Outcome: Progressing     Problem: DISCHARGE PLANNING  Goal: Discharge to home or other facility with appropriate resources  Description: INTERVENTIONS:  - Identify barriers to discharge w/patient and caregiver  - Arrange for needed discharge resources and transportation as appropriate  - Identify discharge learning needs (meds, wound care, etc.)  - Arrange for interpretive services to assist at discharge as needed  - Refer to Case Management Department for coordinating discharge planning if the patient needs post-hospital services based on physician/advanced practitioner order or complex needs  related to functional status, cognitive ability, or social support system  Outcome: Progressing     Problem: Knowledge Deficit  Goal: Patient/family/caregiver demonstrates understanding of disease process, treatment plan, medications, and discharge instructions  Description: Complete learning assessment and assess knowledge base.  Interventions:  - Provide teaching at level of understanding  - Provide teaching via preferred learning methods  Outcome: Progressing     Problem: GASTROINTESTINAL - ADULT  Goal: Minimal or absence of nausea and/or vomiting  Description: INTERVENTIONS:  - Administer IV fluids if ordered to ensure adequate hydration  - Maintain NPO status until nausea and vomiting are resolved  - Nasogastric tube if ordered  - Administer ordered antiemetic medications as needed  - Provide nonpharmacologic comfort measures as appropriate  - Advance diet as tolerated, if ordered  - Consider nutrition services referral to assist patient with adequate nutrition and appropriate food choices  Outcome: Progressing  Goal: Maintains or returns to baseline bowel function  Description: INTERVENTIONS:  - Assess bowel function  - Encourage oral fluids to ensure adequate hydration  - Administer IV fluids if ordered to ensure adequate hydration  - Administer ordered medications as needed  - Encourage mobilization and activity  - Consider nutritional services referral to assist patient with adequate nutrition and appropriate food choices  Outcome: Progressing  Goal: Maintains adequate nutritional intake  Description: INTERVENTIONS:  - Monitor percentage of each meal consumed  - Identify factors contributing to decreased intake, treat as appropriate  - Assist with meals as needed  - Monitor I&O, weight, and lab values if indicated  - Obtain nutrition services referral as needed  Outcome: Progressing  Goal: Establish and maintain optimal ostomy function  Description: INTERVENTIONS:  - Assess bowel function  - Encourage oral  fluids to ensure adequate hydration  - Administer IV fluids if ordered to ensure adequate hydration   - Administer ordered medications as needed  - Encourage mobilization and activity  - Nutrition services referral to assist patient with appropriate food choices  - Assess stoma site  - Consider wound care consult   Outcome: Progressing  Goal: Oral mucous membranes remain intact  Description: INTERVENTIONS  - Assess oral mucosa and hygiene practices  - Implement preventative oral hygiene regimen  - Implement oral medicated treatments as ordered  - Initiate Nutrition services referral as needed  Outcome: Progressing

## 2024-03-13 NOTE — PROGRESS NOTES
"[facility]  Progress Note  Name: Brenton Saez I  MRN: [id number]  Unit/Bed#: [facility] 927-01 I Date of Admission: 3/10/2024   Date of Service: 3/13/2024 I Hospital Day: 3    Assessment/Plan   * Pancreatic mass concerning for pancreatic CA  Assessment & Plan  Presented with worsening abdominal pain and darker urine.  Noted to have elevated LFTs  CT abdomen noted for \"Ill-defined 1.5 x 1.2 x 2.1 cm heterogeneously enhancing hypodense mass in the pancreatic head/uncinate process region noted with associated biliary and pancreatic ductal dilatation with few nonspecific subcentimeter upper abdominal peripancreatic mesenteric lymph nodes are seen\"  GI following, appreciate recommendations.    Pending ERCP and EUS 3/13  CT chest- noting small 1mm Left upper lobe nodule- Follow-up CT chest in 3 months recommended to assure stability and exclude developing early metastasis.   Surgical oncology following, recommended repeating C-19-9 once jaundice has resolved  CA 19-9: 859; CEA 3.3; AFP 2.57   Pain management  Supportive care  Monitor hemodynamics    IFG (impaired fasting glucose)  Assessment & Plan  A1c 6.2  Pt reports exercising at home and was attempting to lose weight over the past 6 months. He has lost 30lbs overall     Slow transit constipation  Assessment & Plan  Noted on CT   Pt reports he was following outpatient with GI for constipation  C/w bowel regimen- reported to previous provider now typically having three BMs a day     LFT elevation  Assessment & Plan  Noted elevated ALT, AST, ALP and direct bilirubin in the settings of above.   Recent Labs     03/10/24  1536 03/11/24  0634 03/12/24  0450 03/13/24  0550   * 503* 463* 524*   ALT 1,060* 981* 1,028* 1,013*   ALKPHOS 416* 412* 429* 453*   TBILI 5.96* 5.67* 7.07* 8.09*   BILIDIR 4.02* 3.60*  --  4.80*     Plan as above  Monitor LFTs  Hold statin, avoid hepatotoxic agents    Hyperlipidemia  Assessment & Plan  Hold " "statin with abnormal LFTs     Heart valve disease  Assessment & Plan  Noted on ECHO in    MV, TV & PV trace regurg; MV mild calcification. Mild AV sclerosis   Recommend f/u outpatient with updated echo     Malignant neoplasm of prostate (HCC)  Assessment & Plan  S/p radical prostatectomy by Dr. Martinez in 2021 with [place] 7A pathology.   Last seen at [facility] 10/23  PSA is unchanged at 0.011   Recommended by urology to have a PSA in 6 months outpatient         VTE Pharmacologic Prophylaxis: VTE Score: 3 Moderate Risk (Score 3-4) - Pharmacological DVT Prophylaxis Contraindicated. Sequential Compression Devices Ordered.    Mobility:   Basic Mobility Inpatient Raw Score: 24  JH-HLM Goal: 8: Walk 250 feet or more  JH-HLM Achieved: 8: Walk 250 feet ot more  HLM Goal achieved. Continue to encourage appropriate mobility.    Patient Centered Rounds: I performed bedside rounds with nursing staff today.   Discussions with Specialists or Other Care Team Provider: will d/w cm    Education and Discussions with Family / Patient: Patient declined call to .     Total Time Spent on Date of Encounter in care of patient: 20 mins. This time was spent on one or more of the following: performing physical exam; counseling and coordination of care; obtaining or reviewing history; documenting in the medical record; reviewing/ordering tests, medications or procedures; communicating with other healthcare professionals and discussing with patient's family/caregivers.    Current Length of Stay: 3 day(s)  Current Patient Status: Inpatient   Certification Statement: The patient will continue to require additional inpatient hospital stay due to EUS/ERCP today   Discharge Plan: Anticipate discharge tomorrow to home.    Code Status: Level 1 - Full Code    Subjective:   No acute complaints.  Having some \"side sticker\" type pain but reports this is controlled.      Objective:     Vitals:   Temp (24hrs), Av.4 " °F (36.9 °C), Min:98.1 °F (36.7 °C), Max:98.9 °F (37.2 °C)    Temp:  [98.1 °F (36.7 °C)-98.9 °F (37.2 °C)] 98.1 °F (36.7 °C)  HR:  [56-59] 59  Resp:  [16-20] 20  BP: (115-132)/(71-74) 132/74  SpO2:  [97 %-100 %] 100 %  Body mass index is 29.34 kg/m².     Input and Output Summary (last 24 hours):     Intake/Output Summary (Last 24 hours) at 3/13/2024 1440  Last data filed at 3/13/2024 0900  Gross per 24 hour   Intake 0 ml   Output --   Net 0 ml       Physical Exam:   Physical Exam  Vitals reviewed.   Constitutional:       General: He is not in acute distress.     Appearance: He is not toxic-appearing.   HENT:      Head: Normocephalic and atraumatic.   Eyes:      Extraocular Movements: Extraocular movements intact.   Pulmonary:      Effort: Pulmonary effort is normal. No respiratory distress.   Musculoskeletal:         General: Normal range of motion.   Neurological:      General: No focal deficit present.      Mental Status: He is alert and oriented to person, place, and time.   Psychiatric:         Mood and Affect: Mood normal.         Behavior: Behavior normal.         Thought Content: Thought content normal.          Additional Data:     Labs:  Results from last 7 days   Lab Units 03/13/24  0550 03/12/24  0450 03/10/24  1536   WBC Thousand/uL 4.75   < > 6.12   HEMOGLOBIN g/dL 13.1   < > 13.0   HEMATOCRIT % 38.7   < > 39.9   PLATELETS Thousands/uL 179   < > 180   NEUTROS PCT %  --   --  63   LYMPHS PCT %  --   --  21   MONOS PCT %  --   --  11   EOS PCT %  --   --  4    < > = values in this interval not displayed.     Results from last 7 days   Lab Units 03/13/24  0550   SODIUM mmol/L 140   POTASSIUM mmol/L 4.4   CHLORIDE mmol/L 105   CO2 mmol/L 26   BUN mg/dL 12   CREATININE mg/dL 0.93   ANION GAP mmol/L 9   CALCIUM mg/dL 9.3   ALBUMIN g/dL 4.1   TOTAL BILIRUBIN mg/dL 8.09*   ALK PHOS U/L 453*   ALT U/L 1,013*   AST U/L 524*   GLUCOSE RANDOM mg/dL 112     Results from last 7 days   Lab Units 03/10/24  1536   INR   0.88         Results from last 7 days   Lab Units 03/10/24  1536   HEMOGLOBIN A1C % 6.2*           Lines/Drains:  Invasive Devices       Peripheral Intravenous Line  Duration             Peripheral IV 03/10/24 Left Antecubital 2 days                          Imaging: No pertinent imaging reviewed.    Recent Cultures (last 7 days):         Last 24 Hours Medication List:   Current Facility-Administered Medications   Medication Dose Route Frequency Provider Last Rate    acetaminophen  650 mg Oral Q6H PRN Lincoln Linn MD      polyethylene glycol  17 g Oral Daily Orlando G Manuel,       senna  2 tablet Oral Daily PRN CARLOS De León          Today, Patient Was Seen By: Nahed Diggs PA-C    **Please Note: This note may have been constructed using a voice recognition system.**

## 2024-03-13 NOTE — PROGRESS NOTES
"Progress Note - Oncologic Surgery   Brenton Saez 63 y.o. male MRN: [id number]  Unit/Bed#: [facility] 927-01 Encounter: [id number]    Assessment:  63-year-old male with 4 days of progressive abdominal pain and scleral icterus, found to have elevated LFT, hyperbilirubinemia chronic anemia and noted to have an ill-defined mass of the pancreatic head    Plan:  -N.p.o. since midnight  -Follow-up for EGD/EUS biopsy  -Encourage out of bed and ambulation  -Analgesia and antiemetics as needed  -Encourage IS use  -Rest of care per primary      Subjective/Objective     Subjective: Patient denies any nausea vomiting had good appetite has just some vague abdominal discomfort.    Objective: AVSS on room air    Blood pressure 119/72, pulse 58, temperature 98.9 °F (37.2 °C), temperature source Oral, resp. rate 17, height 5' 9.49\" (1.765 m), weight 91.4 kg (201 lb 8 oz), SpO2 97%.,Body mass index is 29.34 kg/m².    UOP: unmeasured      Invasive Devices       Peripheral Intravenous Line  Duration             Peripheral IV 03/10/24 Left Antecubital 2 days                    Physical Exam:  General: No acute distress, alert and oriented  CV: RRR  Lungs: Normal work of breathing   Abdomen: Soft, nondistended, nontender  Skin: Warm, dry, jaundice    Lab, Imaging and other studies:  Recent Labs     03/10/24  1536 03/11/24  0634 03/12/24  0450   WBC 6.12  --  4.73   HGB 13.0  --  12.8     --  148*   SODIUM 140  --  139   K 4.0  --  4.3     --  106   CO2 31  --  26   BUN 13  --  11   CREATININE 1.01  --  0.90   GLUC 131  --  105   CALCIUM 9.5  --  9.2   * 503* 463*   ALT 1,060* 981* 1,028*   ALKPHOS 416* 412* 429*   TBILI 5.96* 5.67* 7.07*       "

## 2024-03-13 NOTE — PLAN OF CARE
Problem: INFECTION - ADULT  Goal: Absence or prevention of progression during hospitalization  Description: INTERVENTIONS:  - Assess and monitor for signs and symptoms of infection  - Monitor lab/diagnostic results  - Monitor all insertion sites, i.e. indwelling lines, tubes, and drains  - Monitor endotracheal if appropriate and nasal secretions for changes in amount and color  - [place] appropriate cooling/warming therapies per order  - Administer medications as ordered  - Instruct and encourage patient and family to use good hand hygiene technique  - Identify and instruct in appropriate isolation precautions for identified infection/condition  Outcome: Progressing  Goal: Absence of fever/infection during neutropenic period  Description: INTERVENTIONS:  - Monitor WBC    Outcome: Progressing     Problem: GASTROINTESTINAL - ADULT  Goal: Minimal or absence of nausea and/or vomiting  Description: INTERVENTIONS:  - Administer IV fluids if ordered to ensure adequate hydration  - Maintain NPO status until nausea and vomiting are resolved  - Nasogastric tube if ordered  - Administer ordered antiemetic medications as needed  - Provide nonpharmacologic comfort measures as appropriate  - Advance diet as tolerated, if ordered  - Consider nutrition services referral to assist patient with adequate nutrition and appropriate food choices  Outcome: Progressing     Problem: GASTROINTESTINAL - ADULT  Goal: Maintains or returns to baseline bowel function  Description: INTERVENTIONS:  - Assess bowel function  - Encourage oral fluids to ensure adequate hydration  - Administer IV fluids if ordered to ensure adequate hydration  - Administer ordered medications as needed  - Encourage mobilization and activity  - Consider nutritional services referral to assist patient with adequate nutrition and appropriate food choices  Outcome: Progressing     Problem: GASTROINTESTINAL - ADULT  Goal: Maintains adequate nutritional intake  Description:  INTERVENTIONS:  - Monitor percentage of each meal consumed  - Identify factors contributing to decreased intake, treat as appropriate  - Assist with meals as needed  - Monitor I&O, weight, and lab values if indicated  - Obtain nutrition services referral as needed  Outcome: Progressing

## 2024-03-13 NOTE — UTILIZATION REVIEW
"Continued Stay Review    Date: 03/13/2024                          Current Patient Class: Inpatient  Current Level of Care: Med/Surg    HPI:63 y.o. male initially admitted on 03/10/2024     Assessment/Plan: Reports vague abdominal discomfort.  Presented with abdominal pain and scleral icterus, found to have elevated LFT, hyperbilirubinemia chronic anemia and noted to have an ill-defined mass of the pancreatic head.  NPO since MN.  Follow up EGD/EUS bx.  Encourage OOB & Ambulation.  Analgesia & Antiemetic control PRN.  Encourage use of IS.  Room Air.      Vital Signs: /72   Pulse 56   Temp 98.3 °F (36.8 °C)   Resp 18   Ht 5' 9.49\" (1.765 m)   Wt 91.4 kg (201 lb 8 oz)   SpO2 97%   BMI 29.34 kg/m²     Pertinent Labs/Diagnostic Results:     Results from last 7 days   Lab Units 03/13/24  0550 03/12/24  0450 03/10/24  1536   WBC Thousand/uL 4.75 4.73 6.12   HEMOGLOBIN g/dL 13.1 12.8 13.0   HEMATOCRIT % 38.7 39.4 39.9   PLATELETS Thousands/uL 179 148* 180   NEUTROS ABS Thousands/µL  --   --  3.87     Results from last 7 days   Lab Units 03/13/24  0550 03/12/24  0450 03/10/24  1536   SODIUM mmol/L 140 139 140   POTASSIUM mmol/L 4.4 4.3 4.0   CHLORIDE mmol/L 105 106 103   CO2 mmol/L 26 26 31   ANION GAP mmol/L 9 7 6   BUN mg/dL 12 11 13   CREATININE mg/dL 0.93 0.90 1.01   EGFR ml/min/1.73sq m 87 91 79   CALCIUM mg/dL 9.3 9.2 9.5     Results from last 7 days   Lab Units 03/13/24  0550 03/12/24  0450 03/11/24  0634 03/10/24  1536   AST U/L 524* 463* 503* 491*   ALT U/L 1,013* 1,028* 981* 1,060*   ALK PHOS U/L 453* 429* 412* 416*   TOTAL PROTEIN g/dL 6.4 6.1* 5.6* 6.8   ALBUMIN g/dL 4.1 3.9 3.6 4.2   TOTAL BILIRUBIN mg/dL 8.09* 7.07* 5.67* 5.96*   BILIRUBIN DIRECT mg/dL 4.80*  --  3.60* 4.02*       Results from last 7 days   Lab Units 03/13/24  0550 03/12/24  0450 03/10/24  1536   GLUCOSE RANDOM mg/dL 112 105 131     Results from last 7 days   Lab Units 03/10/24  1536   HEMOGLOBIN A1C % 6.2*   EAG mg/dl 131     "   Results from last 7 days   Lab Units 03/10/24  1536   PROTIME seconds 12.3   INR  0.88   PTT seconds 25     Results from last 7 days   Lab Units 03/10/24  1649   HEP B S AG  Non-reactive   HEP C AB  Non-reactive   HEP B C IGM  Non-reactive     Results from last 7 days   Lab Units 03/10/24  1536   LIPASE u/L 8*     Results from last 7 days   Lab Units 03/11/24  1139   CEA ng/mL 3.3*     Results from last 7 days   Lab Units 03/10/24  1616   CLARITY UA  Clear   COLOR UA  Yellow   SPEC GRAV UA  <1.005*   PH UA  7.0   GLUCOSE UA mg/dl Negative   KETONES UA mg/dl Negative   BLOOD UA  Negative   PROTEIN UA mg/dl Negative   NITRITE UA  Negative   BILIRUBIN UA  Small*   UROBILINOGEN UA (BE) mg/dl <2.0   LEUKOCYTES UA  Negative     Medications:   Scheduled Medications:  polyethylene glycol, 17 g, Oral, Daily      Continuous IV Infusions:     PRN Meds:  acetaminophen, 650 mg, Oral, Q6H PRN   x 1 dose 3/12  senna, 2 tablet, Oral, Daily PRN        Discharge Plan: TBD    Network Utilization Review Department  ATTENTION: Please call with any questions or concerns to [phone] and carefully listen to the prompts so that you are directed to the right person. All voicemails are confidential.   For Discharge needs, contact Care Management DC Support Team at [phone] opt. [phone]  Send all requests for admission clinical reviews, approved or denied determinations and any other requests to dedicated fax number below belonging to the campus where the patient is receiving treatment. List of dedicated fax numbers for the Facilities:  FACILITY NAME UR FAX NUMBER   ADMISSION DENIALS (Administrative/Medical Necessity) [phone]   DISCHARGE SUPPORT TEAM (NETWORK) [phone]   PARENT CHILD HEALTH (Maternity/NICU/Pediatrics) [phone]   [facility] [phone]   [facility] [phone]   [facility] [phone]   [facility]  [phone]   [facility] [phone]   [facility] [phone]   [facility] [phone]   [facility] [phone]   [facility] [phone]   [facility] [phone]   [facility] [phone]   [facility] [phone]

## 2024-03-13 NOTE — ASSESSMENT & PLAN NOTE
Noted on CT   Pt reports he was following outpatient with GI for constipation  C/w bowel regimen- reported to previous provider now typically having three BMs a day

## 2024-03-13 NOTE — ANESTHESIA PREPROCEDURE EVALUATION
Procedure:  ENDOSCOPIC ULTRASOUND (UPPER)  ERCP    Relevant Problems   CARDIO   (+) Heart valve disease   (+) Hyperlipidemia      /RENAL   (+) Malignant neoplasm of prostate (HCC)      Other   (+) LFT elevation   (+) Pancreatic mass concerning for pancreatic CA      ECHO June 2023: EF 65%.Trace aortic regurg, no stenosis.  Physical Exam    Airway    Mallampati score: II  TM Distance: >3 FB  Neck ROM: full     Dental   No notable dental hx     Cardiovascular      Pulmonary      Other Findings        Anesthesia Plan  ASA Score- 2     Anesthesia Type- general with ASA Monitors.         Additional Monitors:     Airway Plan: ETT.           Plan Factors-Exercise tolerance (METS): >4 METS.    Chart reviewed.    Patient summary reviewed.                  Induction- intravenous.    Postoperative Plan-     Informed Consent- Anesthetic plan and risks discussed with patient.  I personally reviewed this patient with the CRNA. Discussed and agreed on the Anesthesia Plan with the CRNA..

## 2024-03-13 NOTE — ASSESSMENT & PLAN NOTE
Noted elevated ALT, AST, ALP and direct bilirubin in the settings of above.   Recent Labs     03/10/24  1536 03/11/24  0634 03/12/24  0450 03/13/24  0550   * 503* 463* 524*   ALT 1,060* 981* 1,028* 1,013*   ALKPHOS 416* 412* 429* 453*   TBILI 5.96* 5.67* 7.07* 8.09*   BILIDIR 4.02* 3.60*  --  4.80*     Plan as above  Monitor LFTs  Hold statin, avoid hepatotoxic agents

## 2024-03-13 NOTE — ANESTHESIA POSTPROCEDURE EVALUATION
Post-Op Assessment Note    CV Status:  Stable    Pain management: satisfactory to patient       Mental Status:  Sleepy   Hydration Status:  Euvolemic   PONV Controlled:  Controlled   Airway Patency:  Patent     Post Op Vitals Reviewed: Yes    No anethesia notable event occurred.    Staff: AnesthesiologistGLORY           /82 (03/13/24 1652)    Temp (!) 96.7 °F (35.9 °C) (03/13/24 1652)    Pulse 78 (03/13/24 1652)   Resp 16 (03/13/24 1652)    SpO2 100 % (03/13/24 1652)

## 2024-03-13 NOTE — ASSESSMENT & PLAN NOTE
S/p radical prostatectomy by Dr. Martinez in July of 2021 with Ale 7A pathology.   Last seen at [facility] 10/23  PSA is unchanged at 0.011   Recommended by urology to have a PSA in 6 months outpatient

## 2024-03-14 ENCOUNTER — TELEPHONE (OUTPATIENT)
Dept: FAMILY MEDICINE CLINIC | Facility: HOSPITAL | Age: 64
End: 2024-03-14

## 2024-03-14 ENCOUNTER — TRANSITIONAL CARE MANAGEMENT (OUTPATIENT)
Dept: FAMILY MEDICINE CLINIC | Facility: HOSPITAL | Age: 64
End: 2024-03-14

## 2024-03-14 VITALS
RESPIRATION RATE: 15 BRPM | OXYGEN SATURATION: 99 % | TEMPERATURE: 98.1 F | DIASTOLIC BLOOD PRESSURE: 77 MMHG | HEART RATE: 61 BPM | SYSTOLIC BLOOD PRESSURE: 146 MMHG | HEIGHT: 69 IN | WEIGHT: 201.5 LBS | BODY MASS INDEX: 29.84 KG/M2

## 2024-03-14 LAB
ALBUMIN SERPL BCG-MCNC: 4.3 G/DL (ref 3.5–5)
ALP SERPL-CCNC: 456 U/L (ref 34–104)
ALT SERPL W P-5'-P-CCNC: 960 U/L (ref 7–52)
ANION GAP SERPL CALCULATED.3IONS-SCNC: 10 MMOL/L (ref 4–13)
AST SERPL W P-5'-P-CCNC: 424 U/L (ref 13–39)
BILIRUB DIRECT SERPL-MCNC: 1.91 MG/DL (ref 0–0.2)
BILIRUB SERPL-MCNC: 3.98 MG/DL (ref 0.2–1)
BUN SERPL-MCNC: 10 MG/DL (ref 5–25)
CALCIUM SERPL-MCNC: 9.3 MG/DL (ref 8.4–10.2)
CHLORIDE SERPL-SCNC: 105 MMOL/L (ref 96–108)
CO2 SERPL-SCNC: 29 MMOL/L (ref 21–32)
CREAT SERPL-MCNC: 0.87 MG/DL (ref 0.6–1.3)
GFR SERPL CREATININE-BSD FRML MDRD: 92 ML/MIN/1.73SQ M
GLUCOSE SERPL-MCNC: 120 MG/DL (ref 65–140)
POTASSIUM SERPL-SCNC: 4.3 MMOL/L (ref 3.5–5.3)
PROT SERPL-MCNC: 6.8 G/DL (ref 6.4–8.4)
SODIUM SERPL-SCNC: 144 MMOL/L (ref 135–147)

## 2024-03-14 PROCEDURE — 99232 SBSQ HOSP IP/OBS MODERATE 35: CPT | Performed by: SURGERY

## 2024-03-14 PROCEDURE — 99232 SBSQ HOSP IP/OBS MODERATE 35: CPT | Performed by: INTERNAL MEDICINE

## 2024-03-14 PROCEDURE — 99239 HOSP IP/OBS DSCHRG MGMT >30: CPT | Performed by: PHYSICIAN ASSISTANT

## 2024-03-14 PROCEDURE — 80048 BASIC METABOLIC PNL TOTAL CA: CPT | Performed by: PHYSICIAN ASSISTANT

## 2024-03-14 PROCEDURE — 80076 HEPATIC FUNCTION PANEL: CPT | Performed by: PHYSICIAN ASSISTANT

## 2024-03-14 RX ADMIN — POLYETHYLENE GLYCOL 3350 17 G: 17 POWDER, FOR SOLUTION ORAL at 08:07

## 2024-03-14 NOTE — ASSESSMENT & PLAN NOTE
"Presented with worsening abdominal pain and darker urine.  Noted to have elevated LFTs  CT abdomen noted for \"Ill-defined 1.5 x 1.2 x 2.1 cm heterogeneously enhancing hypodense mass in the pancreatic head/uncinate process region noted with associated biliary and pancreatic ductal dilatation with few nonspecific subcentimeter upper abdominal peripancreatic mesenteric lymph nodes are seen\"  CT chest- noting small 1mm Left upper lobe nodule- Follow-up CT chest in 3 months recommended to assure stability and exclude developing early metastasis.   GI and surgical oncology following  CA 19-9: 859; CEA 3.3; AFP 2.57   S/p EUS/ERCP on 3/13 with GI s/p sphincterotomy, stent to CBD and pancreatic duct and biopsy of pancreatic mass.  Will need to f/u these results at discharge.  Will need KUB in 7-10 days to evaluate if pancreatic duct stent has passed   Reports to some discomfort but no overt pain.  Tolerating diet.  LFTs are improving.  Cleared for d/c with outpt f/u   "

## 2024-03-14 NOTE — ASSESSMENT & PLAN NOTE
S/p radical prostatectomy by Dr. Martinez in July of 2021 with Ale 7A pathology.   Last seen at [facility] 10/23  PSA is unchanged at 0.011   Recommended by urology to have a PSA in 6 months outpatient

## 2024-03-14 NOTE — ASSESSMENT & PLAN NOTE
Noted elevated ALT, AST, ALP and direct bilirubin in the settings of above.   Recent Labs     03/12/24  0450 03/13/24  0550 03/14/24  0650   * 524* 424*   ALT 1,028* 1,013* 960*   ALKPHOS 429* 453* 456*   TBILI 7.07* 8.09* 3.98*   BILIDIR  --  4.80* 1.91*     Plan as above  Monitor LFTs -- improving following ERCP/stent placement

## 2024-03-14 NOTE — TELEPHONE ENCOUNTER
Mary, this is Brenton ARGUETA. Date of birth March 22nd, 1960. My phone number [phone]. I was just someone had just called and left a message 1. Reaching out asking to set up a follow up appointment visit with Doctor Alfredo after my hospitalization and I was just calling to try to understand what the what the purpose of that is and then what days times are available. So if you would please give me a call back again Brenton Gutierrez at [phone]. Thank you.

## 2024-03-14 NOTE — PLAN OF CARE
Problem: PAIN - ADULT  Goal: Verbalizes/displays adequate comfort level or baseline comfort level  Description: Interventions:  - Encourage patient to monitor pain and request assistance  - Assess pain using appropriate pain scale  - Administer analgesics based on type and severity of pain and evaluate response  - Implement non-pharmacological measures as appropriate and evaluate response  - Consider cultural and social influences on pain and pain management  - Notify physician/advanced practitioner if interventions unsuccessful or patient reports new pain  Outcome: Progressing     Problem: INFECTION - ADULT  Goal: Absence or prevention of progression during hospitalization  Description: INTERVENTIONS:  - Assess and monitor for signs and symptoms of infection  - Monitor lab/diagnostic results  - Monitor all insertion sites, i.e. indwelling lines, tubes, and drains  - Monitor endotracheal if appropriate and nasal secretions for changes in amount and color  - [place] appropriate cooling/warming therapies per order  - Administer medications as ordered  - Instruct and encourage patient and family to use good hand hygiene technique  - Identify and instruct in appropriate isolation precautions for identified infection/condition  Outcome: Progressing  Goal: Absence of fever/infection during neutropenic period  Description: INTERVENTIONS:  - Monitor WBC    Outcome: Progressing     Problem: SAFETY ADULT  Goal: Patient will remain free of falls  Description: INTERVENTIONS:  - Educate patient/family on patient safety including physical limitations  - Instruct patient to call for assistance with activity   - Consult OT/PT to assist with strengthening/mobility   - Keep Call bell within reach  - Keep bed low and locked with side rails adjusted as appropriate  - Keep care items and personal belongings within reach  - Initiate and maintain comfort rounds  - Make Fall Risk Sign visible to staff  - Apply yellow socks and bracelet  for high fall risk patients  - Consider moving patient to room near nurses station  Outcome: Progressing  Goal: Maintain or return to baseline ADL function  Description: INTERVENTIONS:  -  Assess patient's ability to carry out ADLs; assess patient's baseline for ADL function and identify physical deficits which impact ability to perform ADLs (bathing, care of mouth/teeth, toileting, grooming, dressing, etc.)  - Assess/evaluate cause of self-care deficits   - Assess range of motion  - Assess patient's mobility; develop plan if impaired  - Assess patient's need for assistive devices and provide as appropriate  - Encourage maximum independence but intervene and supervise when necessary  - Involve family in performance of ADLs  - Assess for home care needs following discharge   - Consider OT consult to assist with ADL evaluation and planning for discharge  - Provide patient education as appropriate  Outcome: Progressing  Goal: Maintains/Returns to pre admission functional level  Description: INTERVENTIONS:  - Perform AM-PAC 6 Click Basic Mobility/ Daily Activity assessment daily.  - Set and communicate daily mobility goal to care team and patient/family/caregiver.   - Collaborate with rehabilitation services on mobility goals if consulted  - Record patient progress and toleration of activity level   Outcome: Progressing

## 2024-03-14 NOTE — PROGRESS NOTES
Progress Note -  [facility]  Brenton Saez 63 y.o. male MRN: [id number]  Unit/Bed#: [facility] 927-01 Encounter: [id number]      ASSESSMENT AND PLAN:      63-year-old male with past medical history of constipation, prostate cancer status post prostatectomy who was admitted for abdominal pain, change in urine color.  GI is consulted due to concern for pancreatic mass on imaging.     Pancreatic mass  Biliary obstruction  Elevated LFTs  Abdominal pain  Bilirubin 5.67.  Mostly direct. On CT he has a 1.5 x 1.2 x 2.1 cm heterogenous mass in the pancreatic head with upstream PD and CBD dilation.  Concerning for malignancy.  Acute hepatitis panel negative.  CA 19-9 is 800s.  EUS showed 3 x 3 cm mass status post FNA.  Preliminary is malignant.  ERCP completed where sphincterotomy was performed, debris was removed, 10 x 6 fully covered metal stent was placed in CBD as well as 5 x 4 cm plastic pancreatic duct stent was placed.  Bilirubin improved from 8.07 to 3.9.  Rest of LFTs resolving.  Follow-up biopsy results.  Diet as tolerated.  MiraLAX as needed for constipation.  Pain management per primary team.  Follow-up with medical/surgical oncology.  Monitor LFTs daily.    Gastroenterology to sign off at this time for please call with any questions.  Rest of care per primary team.    ______________________________________________________________________    Subjective: Seen and examined.  Had some generalized abdominal discomfort but denies any nausea, vomiting.  Tolerating diet.  Rest ROS was negative.    REVIEW OF SYSTEMS:    Review of Systems   Constitutional:  Negative for chills and fever.   HENT:  Negative for congestion and sinus pressure.    Respiratory:  Negative for cough and shortness of breath.    Cardiovascular:  Negative for chest pain, palpitations and leg swelling.   Gastrointestinal:  Negative for abdominal pain, diarrhea, nausea and vomiting.   Genitourinary:  Negative for dysuria and hematuria.  "  Musculoskeletal:  Negative for arthralgias and back pain.   Skin:  Negative for color change and rash.   Neurological:  Negative for dizziness and headaches.   Psychiatric/Behavioral:  Negative for agitation and confusion.    All other systems reviewed and are negative.         Historical Information   Past Medical History:   Diagnosis Date    Cancer (HCC)     prostate    Colon polyp     HL (hearing loss)     Murmur, heart     Obesity     Patient denies medical problems      Past Surgical History:   Procedure Laterality Date    APPENDECTOMY      COLONOSCOPY      CYST REMOVAL Right 2018    right index finger (knuckle)    HAND SURGERY      PROSTATECTOMY      TONSILLECTOMY       Social History   Social History     Substance and Sexual Activity   Alcohol Use Not Currently    Comment: 1 beer per month     Social History     Substance and Sexual Activity   Drug Use Never     Social History     Tobacco Use   Smoking Status Never    Passive exposure: Never   Smokeless Tobacco Never     Family History   Problem Relation Age of Onset    Diabetes Mother     Arthritis Mother     Stroke Father     Diabetes Sister     Pancreatic cancer Paternal Aunt        Meds/Allergies     Medications Prior to Admission   Medication    ferrous sulfate 325 (65 Fe) mg tablet    Multiple Vitamins-Minerals (Multi For Him 50+) TABS    rosuvastatin (Crestor) 5 mg tablet    tadalafil (CIALIS) 10 MG tablet    azelastine (ASTELIN) 0.1 % nasal spray     Current Facility-Administered Medications   Medication Dose Route Frequency    acetaminophen (TYLENOL) tablet 650 mg  650 mg Oral Q6H PRN    polyethylene glycol (MIRALAX) packet 17 g  17 g Oral Daily    senna (SENOKOT) tablet 17.2 mg  2 tablet Oral Daily PRN       Allergies   Allergen Reactions    Penicillins Other (See Comments)     Unsure as a child           Objective     Blood pressure 150/78, pulse 74, temperature 97.9 °F (36.6 °C), resp. rate 16, height 5' 9.49\" (1.765 m), weight 91.4 kg (201 lb 8 " oz), SpO2 97%. Body mass index is 29.34 kg/m².      Intake/Output Summary (Last 24 hours) at 3/14/2024 0653  Last data filed at 3/14/2024 0601  Gross per 24 hour   Intake 1510.5 ml   Output --   Net 1510.5 ml         PHYSICAL EXAM:      Physical Exam  Vitals and nursing note reviewed.   Constitutional:       General: He is not in acute distress.     Appearance: Normal appearance. He is well-developed. He is not ill-appearing.   HENT:      Head: Normocephalic and atraumatic.      Mouth/Throat:      Mouth: Mucous membranes are moist.   Eyes:      Extraocular Movements: Extraocular movements intact.      Conjunctiva/sclera: Conjunctivae normal.   Cardiovascular:      Rate and Rhythm: Normal rate.      Pulses: Normal pulses.   Pulmonary:      Effort: Pulmonary effort is normal.   Abdominal:      General: Abdomen is flat. Bowel sounds are normal. There is no distension.      Palpations: Abdomen is soft.      Tenderness: There is no abdominal tenderness. There is no guarding.   Musculoskeletal:      Cervical back: Neck supple.      Right lower leg: No edema.      Left lower leg: No edema.   Skin:     General: Skin is warm and dry.   Neurological:      General: No focal deficit present.      Mental Status: He is alert and oriented to person, place, and time.   Psychiatric:         Mood and Affect: Mood normal.         Behavior: Behavior normal.          Lab Results:   Admission on 03/10/2024   Component Date Value    Total Bilirubin 03/11/2024 5.67 (H)     Bilirubin, Direct 03/11/2024 3.60 (H)     Alkaline Phosphatase 03/11/2024 412 (H)     AST 03/11/2024 503 (H)     ALT 03/11/2024 981 (H)     Total Protein 03/11/2024 5.6 (L)     Albumin 03/11/2024 3.6     CA 19-9 03/11/2024 859 (H)     CEA 03/11/2024 3.3 (H)     AFP TUMOR MARKER 03/11/2024 2.57     Hemoglobin A1C 03/10/2024 6.2 (H)     EAG 03/10/2024 131     WBC 03/12/2024 4.73     RBC 03/12/2024 4.23     Hemoglobin 03/12/2024 12.8     Hematocrit 03/12/2024 39.4     MCV  03/12/2024 93     MCH 03/12/2024 30.3     MCHC 03/12/2024 32.5     RDW 03/12/2024 15.6 (H)     Platelets 03/12/2024 148 (L)     MPV 03/12/2024 14.5 (H)     Sodium 03/12/2024 139     Potassium 03/12/2024 4.3     Chloride 03/12/2024 106     CO2 03/12/2024 26     ANION GAP 03/12/2024 7     BUN 03/12/2024 11     Creatinine 03/12/2024 0.90     Glucose 03/12/2024 105     Calcium 03/12/2024 9.2     AST 03/12/2024 463 (H)     ALT 03/12/2024 1,028 (H)     Alkaline Phosphatase 03/12/2024 429 (H)     Total Protein 03/12/2024 6.1 (L)     Albumin 03/12/2024 3.9     Total Bilirubin 03/12/2024 7.07 (H)     eGFR 03/12/2024 91     Sodium 03/13/2024 140     Potassium 03/13/2024 4.4     Chloride 03/13/2024 105     CO2 03/13/2024 26     ANION GAP 03/13/2024 9     BUN 03/13/2024 12     Creatinine 03/13/2024 0.93     Glucose 03/13/2024 112     Calcium 03/13/2024 9.3     eGFR 03/13/2024 87     Total Bilirubin 03/13/2024 8.09 (H)     Bilirubin, Direct 03/13/2024 4.80 (H)     Alkaline Phosphatase 03/13/2024 453 (H)     AST 03/13/2024 524 (H)     ALT 03/13/2024 1,013 (H)     Total Protein 03/13/2024 6.4     Albumin 03/13/2024 4.1     WBC 03/13/2024 4.75     RBC 03/13/2024 4.29     Hemoglobin 03/13/2024 13.1     Hematocrit 03/13/2024 38.7     MCV 03/13/2024 90     MCH 03/13/2024 30.5     MCHC 03/13/2024 33.9     RDW 03/13/2024 15.6 (H)     Platelets 03/13/2024 179     MPV 03/13/2024 13.5 (H)        Imaging Studies: I have personally reviewed pertinent imaging studies.    Orlando Jackson D.O.  Gastroenterology Fellow  PGY-5  Available via [employer]  3/14/2024 6:53 AM

## 2024-03-14 NOTE — PROGRESS NOTES
"Progress Note - Oncologic Surgery   Brenton Saez 63 y.o. male MRN: [id number]  Unit/Bed#: [facility] 927-01 Encounter: [id number]    Assessment:  63-year-old male with 4 days of progressive abdominal pain and scleral icterus, found to have elevated LFT, hyperbilirubinemia chronic anemia and noted to have an ill-defined mass of the pancreatic head    Plan:  -Advance diet as tolerated  -Follow-up biopsy results  -Encourage out of bed and ambulation  -Analgesia and antiemetics as needed  -Encourage IS use  -Rest of care per primary      Subjective/Objective     Subjective: Patient resting comfortably this morning.  Still reports some vague mid epigastric/right upper quadrant abdominal pain but is otherwise without any complaints.  He is hungry.  Denies any nausea or vomiting having normal bowel function voiding freely    Objective: AVSS on room air    Blood pressure 150/78, pulse 74, temperature 97.9 °F (36.6 °C), resp. rate 16, height 5' 9.49\" (1.765 m), weight 91.4 kg (201 lb 8 oz), SpO2 97%.,Body mass index is 29.34 kg/m².    UOP: unmeasured      Invasive Devices       Peripheral Intravenous Line  Duration             Peripheral IV 03/13/24 Right;Ventral (anterior) Forearm <1 day                    Physical Exam:  General: No acute distress, alert and oriented  CV: RRR  Lungs: Normal work of breathing   Abdomen: Soft, nondistended, nontender  Skin: Warm, dry    Lab, Imaging and other studies:  Recent Labs     03/12/24  0450 03/13/24  0550   WBC 4.73 4.75   HGB 12.8 13.1   * 179   SODIUM 139 140   K 4.3 4.4    105   CO2 26 26   BUN 11 12   CREATININE 0.90 0.93   GLUC 105 112   CALCIUM 9.2 9.3   * 524*   ALT 1,028* 1,013*   ALKPHOS 429* 453*   TBILI 7.07* 8.09*       "

## 2024-03-14 NOTE — DISCHARGE SUMMARY
"[facility]  Discharge- Brenton Saez 1960, 63 y.o. male MRN: [id number]  Unit/Bed#: [facility] 927-01 Encounter: [id number]  Primary Care Provider: Aries Waters MD   Date and time admitted to hospital: 3/10/2024 10:45 PM    * Pancreatic mass concerning for pancreatic CA  Assessment & Plan  Presented with worsening abdominal pain and darker urine.  Noted to have elevated LFTs  CT abdomen noted for \"Ill-defined 1.5 x 1.2 x 2.1 cm heterogeneously enhancing hypodense mass in the pancreatic head/uncinate process region noted with associated biliary and pancreatic ductal dilatation with few nonspecific subcentimeter upper abdominal peripancreatic mesenteric lymph nodes are seen\"  CT chest- noting small 1mm Left upper lobe nodule- Follow-up CT chest in 3 months recommended to assure stability and exclude developing early metastasis.   GI and surgical oncology following  CA 19-9: 859; CEA 3.3; AFP 2.57   S/p EUS/ERCP on 3/13 with GI s/p sphincterotomy, stent to CBD and pancreatic duct and biopsy of pancreatic mass.  Will need to f/u these results at discharge.  Will need KUB in 7-10 days to evaluate if pancreatic duct stent has passed   Reports to some discomfort but no overt pain.  Tolerating diet.  LFTs are improving.  Cleared for d/c with outpt f/u     IFG (impaired fasting glucose)  Assessment & Plan  A1c 6.2  Pt reports exercising at home and was attempting to lose weight over the past 6 months. He has lost 30lbs overall     Slow transit constipation  Assessment & Plan  Noted on CT   Pt reports he was following outpatient with GI for constipation    LFT elevation  Assessment & Plan  Noted elevated ALT, AST, ALP and direct bilirubin in the settings of above.   Recent Labs     03/12/24  0450 03/13/24  0550 03/14/24  0650   * 524* 424*   ALT 1,028* 1,013* 960*   ALKPHOS 429* 453* 456*   TBILI 7.07* 8.09* 3.98*   BILIDIR  --  4.80* 1.91*     Plan as above  Monitor LFTs -- " improving following ERCP/stent placement     Hyperlipidemia  Assessment & Plan  Statin was held with abnormal LFTs, according to his d/w GI can resume at d/c     Heart valve disease  Assessment & Plan  Noted on ECHO in 2021   MV, TV & PV trace regurg; MV mild calcification. Mild AV sclerosis   Recommend f/u outpatient with updated echo     Malignant neoplasm of prostate (HCC)  Assessment & Plan  S/p radical prostatectomy by Dr. Martinez in July of 2021 with Ale 7A pathology.   Last seen at [facility] 10/23  PSA is unchanged at 0.011   Recommended by urology to have a PSA in 6 months outpatient        Medical Problems       Resolved Problems  Date Reviewed: 3/14/2024   None       Discharging Physician / Practitioner: Nahed Diggs PA-C  PCP: Aries Waters MD  Admission Date:   Admission Orders (From admission, onward)       Ordered        03/10/24 2246  Inpatient Admission  Once                          Discharge Date: 03/14/24    Consultations During Hospital Stay:  None    Procedures Performed:   EUS/ERCP     Significant Findings / Test Results:   Pancreatic mass  Abnormal LFTs    Incidental Findings:   None     Test Results Pending at Discharge (will require follow up):   Biopsy      Outpatient Tests Requested:  KUB in 7-10 days     Complications:  none    Reason for Admission: jaundice, abd pain     Hospital Course:   Brenton Saez is a 63 y.o. male patient who originally presented to the hospital on 3/10/2024 due to jaundice and progressive abdominal pain.  CT imaging demonstrated pancreatic mass.  He was tx to B for GI and surg/onc evals.  On 3/13 he had EUS which showed 3 x 3 cm mass status post FNA, ERCP where sphincterotomy was performed, debris was removed, 10 x 6 fully covered metal stent was placed in CBD as well as 5 x 4 cm plastic pancreatic duct stent was placed. He is doing well today, tolerating a diet. He notes some mild abd discomfort but denies overt pain.  LFTs are improving  "today.  He is cleared for discharge with outpt f/u with GI, surg/onc, and eventual med/onc to discuss pathology and determine treatment plan.  He will need abd XR in about a week to determine if pancreatic stent has passed.      Please see above list of diagnoses and related plan for additional information.     Condition at Discharge: good    Discharge Day Visit / Exam:   Subjective:  No acute complaints, doing ok.  Denies abd pain just notes some discomfort.  Tolerating diet.    Vitals: Blood Pressure: 146/77 (03/14/24 0750)  Pulse: 61 (03/14/24 0750)  Temperature: 98.1 °F (36.7 °C) (03/14/24 0750)  Temp Source: Tympanic (03/13/24 1652)  Respirations: 15 (03/14/24 0750)  Height: 5' 9.49\" (176.5 cm) (03/11/24 0730)  Weight - Scale: 91.4 kg (201 lb 8 oz) (03/11/24 0729)  SpO2: 99 % (03/14/24 0750)  Exam:   Physical Exam  Vitals reviewed.   Constitutional:       General: He is not in acute distress.     Appearance: He is not toxic-appearing.   HENT:      Head: Normocephalic and atraumatic.   Eyes:      Extraocular Movements: Extraocular movements intact.   Pulmonary:      Effort: Pulmonary effort is normal. No respiratory distress.   Musculoskeletal:         General: Normal range of motion.   Neurological:      General: No focal deficit present.      Mental Status: He is alert and oriented to person, place, and time.   Psychiatric:         Mood and Affect: Mood normal.         Behavior: Behavior normal.         Thought Content: Thought content normal.          Discussion with Family: Patient declined call to .     Discharge instructions/Information to patient and family:   See after visit summary for information provided to patient and family.      Provisions for Follow-Up Care:  See after visit summary for information related to follow-up care and any pertinent home health orders.      Mobility at time of Discharge:   Basic Mobility Inpatient Raw Score: 24  JH-HLM Goal: 8: Walk 250 feet or more  JH-HLM " Achieved: 8: Walk 250 feet ot more  HLM Goal achieved. Continue to encourage appropriate mobility.     Disposition:   Home    Planned Readmission: no     Discharge Statement:  I spent 38 minutes discharging the patient. This time was spent on the day of discharge. I had direct contact with the patient on the day of discharge. Greater than 50% of the total time was spent examining patient, answering all patient questions, arranging and discussing plan of care with patient as well as directly providing post-discharge instructions.  Additional time then spent on discharge activities.    Discharge Medications:  See after visit summary for reconciled discharge medications provided to patient and/or family.      **Please Note: This note may have been constructed using a voice recognition system**

## 2024-03-14 NOTE — DISCHARGE INSTR - AVS FIRST PAGE
If you experience severe abdominal pain, bloody stools, nausea/vomiting, fevers, return to ER for evaluation    Follow up with primary care, GI, surgical oncology, medical oncology on discharge.    You will need an abdominal x-ray in 7-10 days to determine if stent has passed on its own.

## 2024-03-14 NOTE — CASE MANAGEMENT
Case Management Discharge Planning Note    Patient name Brenton Saez  Location [facility] 927/[facility] 927-01 MRN [id number]  : 1960 Date 3/14/2024       Current Admission Date: 3/10/2024  Current Admission Diagnosis:Pancreatic mass concerning for pancreatic CA   Patient Active Problem List    Diagnosis Date Noted    IFG (impaired fasting glucose) 2024    LFT elevation 03/10/2024    Pancreatic mass concerning for pancreatic CA 03/10/2024    Slow transit constipation 03/10/2024    Wears hearing aid in left ear 2023    Benign paroxysmal positional vertigo 2023    Hyperlipidemia 2023    Hearing loss associated with syndrome of left ear 2023    Hyperglycemia 2023    Erectile dysfunction 2023    Heart valve disease 2021    Malignant neoplasm of prostate (HCC) 2021    Elevated PSA 2020    Nocturia 2020    Urinary urgency 2020      LOS (days): 4  Geometric Mean LOS (GMLOS) (days):   Days to GMLOS:     OBJECTIVE:  Risk of Unplanned Readmission Score: 5.35         Current admission status: Inpatient   Preferred Pharmacy:   [address] [address]  Phone: [phone] Fax: [phone]    Primary Care Provider: Aries Waters MD    Primary Insurance: [employer]ET[employer]  Secondary Insurance:     DISCHARGE DETAILS:        Additional Comments: DC order noted.  Pt dc home - no CM needs.

## 2024-03-15 PROBLEM — C25.0 ADENOCARCINOMA OF HEAD OF PANCREAS (HCC): Status: ACTIVE | Noted: 2024-03-15

## 2024-03-15 NOTE — RESULT ENCOUNTER NOTE
Called and notified patient regarding results. Oncology nurse navigation was notified. He has appointment with Dr. Jason on 3/19/24.

## 2024-03-15 NOTE — UTILIZATION REVIEW
NOTIFICATION OF ADMISSION DISCHARGE   This is a Notification of Discharge from [facility]. Please be advised that this patient has been discharge from our facility. Below you will find the admission and discharge date and time including the patient’s disposition.   UTILIZATION REVIEW CONTACT:  Nick Sandoval  Utilization   Network Utilization Review Department  Phone: [phone] x carefully listen to the prompts. All voicemails are confidential.  Email: [email or web address]     ADMISSION INFORMATION  PRESENTATION DATE: 3/10/2024 10:45 PM  OBERVATION ADMISSION DATE:   INPATIENT ADMISSION DATE: 3/10/24 10:45 PM   DISCHARGE DATE: 3/14/2024  1:32 PM   DISPOSITION:Home/Self Care    Network Utilization Review Department  ATTENTION: Please call with any questions or concerns to [phone] and carefully listen to the prompts so that you are directed to the right person. All voicemails are confidential.   For Discharge needs, contact Care Management DC Support Team at [phone] opt. 2  Send all requests for admission clinical reviews, approved or denied determinations and any other requests to dedicated fax number below belonging to the campus where the patient is receiving treatment. List of dedicated fax numbers for the Facilities:  FACILITY NAME UR FAX NUMBER   ADMISSION DENIALS (Administrative/Medical Necessity) [phone]   DISCHARGE SUPPORT TEAM ([facility]) [phone]   PARENT CHILD HEALTH (Maternity/NICU/Pediatrics) [phone]   [facility] [phone]   [facility] [phone]   [facility] [phone]   [facility] [phone]   [facility] [phone]   [facility] [phone]   [facility] [phone]   [facility] [phone]   [facility]  [facility] [phone]   [facility] [phone]   [facility] [phone]   [facility] [phone]

## 2024-03-18 ENCOUNTER — DOCUMENTATION (OUTPATIENT)
Dept: HEMATOLOGY ONCOLOGY | Facility: CLINIC | Age: 64
End: 2024-03-18

## 2024-03-18 ENCOUNTER — PATIENT OUTREACH (OUTPATIENT)
Dept: HEMATOLOGY ONCOLOGY | Facility: CLINIC | Age: 64
End: 2024-03-18

## 2024-03-18 DIAGNOSIS — C25.0 ADENOCARCINOMA OF HEAD OF PANCREAS (HCC): Primary | ICD-10-CM

## 2024-03-18 NOTE — PROGRESS NOTES
Chart rvw'd on 3/18/24    Referring provider-  Dr. Cantrell      EUS and ERCP date-  3/13/24      EUS Impression-  Irregular and hypoechoic mass measuring 34 mm x 31 mm with poorly defined margins was visualized in the pancreas; 3 fine needle biopsy passes were taken. An adequate sample was obtained. Cytology analysis was performed. Onsite cytologist was present and cytology results were preliminarily malignant. No vessel involvement identified.       ERCP Impression- The common bile duct and pancreatic duct were deeply cannulated by employing a double guidewire technique.  Major papilla sphincterotomy was performed.  Multiple sweeps were performed in the proximal common bile duct. Debris was removed.  10 mm x 6 cm fully covered stent was placed in the common bile duct  5 Fr x 4 cm straight single pigtail stent was placed in the pancreatic duct       Pathology-    Final Diagnosis   A.B.Pancreas, Head, (Cell Block and smear preparations):  Malignant  Adenocarcinoma.     Satisfactory for evaluation.     Note: As reported in the “WHO Reporting System for Pancreaticobiliary Cytopathology *” the diagnostic category of “malignant” carries a % risk of malignancy being found in subsequent FNAB or resection.  The usual management following an initial diagnosis of “malignant” is per clinical stage. Ultimately clinical and radiologic correlation is needed for this patient in arriving at the actual management plan.      *Stephan M, Field A, Ashley I (Eds). (2022). WHO Reporting System for Pancreaticobiliary Cytopathology. IARC.  FNAB - fine needle aspiration/biopsy     Intradepartmental consultation is in agreement.     Dr. Apple Cantrell MD is notified of the diagnosis in [facility] via [employer] on 3/15/2024 .   Electronically signed by Toya Chogn MD on 3/15/2024 at 10:15 AM         Labs-  3/13/24 - CBC and CMP    Component  Ref Range & Units 3/11/24 11:39 AM   AFP TUMOR MARKER  0.00 - 9.00 ng/mL 2.57     Component  Ref Range &  Units 3/11/24 11:39 AM   CEA  0.0 - 3.0 ng/mL 3.3 High      Component  Ref Range & Units 3/11/24 11:39 AM   CA 19-9  0 - 35 U/mL 859 High        Imaging-  3/11/24 - CT CHEST WITHOUT IV CONTRAST     IMPRESSION:     1. 1 mm left upper lobe nodule. Follow-up CT chest in 3 months recommended to assure stability and exclude developing early metastasis.     2. Persistent mild biliary and pancreatic duct dilatation due to suspected pancreatic head mass which is not well visualized on this study.     3. Mild coronary artery calcification in keeping with atherosclerotic heart disease.      3/10/24-  CT ABDOMEN AND PELVIS WITH IV CONTRAST    IMPRESSION:     1.  Ill-defined 1.5 x 1.2 x 2.1 cm heterogeneously enhancing hypodense mass in the pancreatic head/uncinate process region noted with associated biliary and pancreatic ductal dilatation. This is highly concerning for adenocarcinoma of the pancreas. A few   nonspecific subcentimeter upper abdominal peripancreatic mesenteric lymph nodes are seen.  2.  Mild colonic constipation. No evidence for bowel obstruction, inflammation, obstructive uropathy, free air, or free fluid.          Scheduled appointments-    3/19/24 - Dr. Jason  3/25/24 - Dr. Busby

## 2024-03-18 NOTE — PROGRESS NOTES
"Phone outreach to the patient, I introduced myself and explained my service to him. I went over his upcoming appointments with Dr. Jason for 3/19 and Dr. Busby for 3/25.  The patient reports understanding his diagnosis and was \"calm\" when speaking with him.  Pt reports persistent 1/10 generalized dull abdominal discomfort described as a bloating feeling.  He denies N/V at present.  He reports having a good appetite and is eating meal without difficulty.  He reports is pursuing a second opinion and does not want me to place for additional resources until he makes a decision.       Pt reports living at home with his wfie and is a good support for him.  He denies any barriers getting to or from any of his appointments and states he will drive himself or his wife can drive him if needed.  He denies any physical barriers at present.  Referrals for Palliative Care, Social Work, and Nutrition were not placed during this encounter and Pt will follow up with me when he makes a decision.  I discussed with him and explained in detail about a possible port placement for Chemotherapy if this is planned for his treatment and he confirmed understanding.  We completed a General Assessment together, I provided my contact information and Andie Xiong and instructed him to please call if he has any questions or concerns.  I thanked him for his time.    "

## 2024-03-19 ENCOUNTER — OFFICE VISIT (OUTPATIENT)
Dept: SURGICAL ONCOLOGY | Facility: CLINIC | Age: 64
End: 2024-03-19
Payer: COMMERCIAL

## 2024-03-19 VITALS
HEIGHT: 69 IN | WEIGHT: 202 LBS | HEART RATE: 60 BPM | TEMPERATURE: 95.4 F | RESPIRATION RATE: 15 BRPM | SYSTOLIC BLOOD PRESSURE: 118 MMHG | OXYGEN SATURATION: 99 % | BODY MASS INDEX: 29.92 KG/M2 | DIASTOLIC BLOOD PRESSURE: 78 MMHG

## 2024-03-19 DIAGNOSIS — C25.0 ADENOCARCINOMA OF HEAD OF PANCREAS (HCC): Primary | ICD-10-CM

## 2024-03-19 PROCEDURE — 99215 OFFICE O/P EST HI 40 MIN: CPT | Performed by: SURGERY

## 2024-03-19 NOTE — LETTER
March 19, 2024     Aries Waters MD  [facility] [address] [address]    Patient: Brenton Saez   YOB: 1960   Date of Visit: 3/19/2024       Dear Dr. Waters:    Thank you for referring Brenton Saez to me for evaluation. Below are my notes for this consultation.    If you have questions, please do not hesitate to call me. I look forward to following your patient along with you.         Sincerely,        Marshal Jason MD        CC: MD Marshal Dalal MD  3/19/2024  1:44 PM  Sign when Signing Visit               Surgical Oncology Follow Up       [facility] [place]  [address] [place] [id number]  [phone]    Brenton Saez  1960  [id number]  [facility]  [address] [address]  [phone]    Diagnoses and all orders for this visit:    Adenocarcinoma of head of pancreas (HCC)  -     Cancer antigen 19-9; Future  -     pancrelipase, Lip-Prot-Amyl, (CREON) 12,000 units capsule; Take 2 tablets with meals and 1 with snacks  -     Cancer antigen 19-9        Chief Complaint   Patient presents with   • Follow-up       No follow-ups on file.      Oncology History   Adenocarcinoma of head of pancreas (HCC)   3/13/2024 Biopsy    EUS/ERCP:  Pancreas, Head, (Cell Block and smear preparations):  Malignant  Adenocarcinoma.     3/15/2024 Initial Diagnosis    Adenocarcinoma of head of pancreas (HCC)         Staging: [id number] pancreatic adenocarcinoma, March 2024  Treatment history:    Current treatment:    Disease status:      History of Present Illness: Patient returns after his hospitalization when he presented with obstructive jaundice.  EUS revealed a 3 cm mass.  This was stented.  Pathology revealed adenocarcinoma.  He comes in now to discuss further therapy.  He is feeling well except for some diarrhea.   He does have bloating and flatulence.  He has lost weight since his hospitalization.    Review of Systems  Complete ROS Surg Onc:   Complete ROS Surg Onc:   Constitutional: The patient has weight loss  Eyes: No complaints of visual problems, no scleral icterus.   ENT: no complaints of ear pain, no hoarseness, no difficulty swallowing,  no tinnitus and no new masses in head, oral cavity, or neck.   Cardiovascular: No complaints of chest pain, no palpitations, no ankle edema.   Respiratory: No complaints of shortness of breath, no cough.   Gastrointestinal: No further jaundice or rainer stool.  Genitourinary: No complaints of dysuria, no hematuria, no nocturia, no frequent urination, no urethral discharge.   Musculoskeletal: No complaints of weakness, paralysis, joint stiffness or arthralgias.  Integumentary: No complaints of rash, no new lesions.   Neurological: No complaints of convulsions, no seizures, no dizziness.   Hematologic/Lymphatic: No complaints of easy bruising.   Endocrine:  No hot or cold intolerance.  No polydipsia, polyphagia, or polyuria.  Allergy/immunology:  No environmental allergies.  No food allergies.  Not immunocompromised.  Skin:  No pallor or rash.  No wound.        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   • Hyperglycemia   • Wears hearing aid in left ear   • LFT elevation   • Pancreatic mass concerning for pancreatic CA   • Slow transit constipation   • IFG (impaired fasting glucose)   • Adenocarcinoma of head of pancreas (HCC)     Past Medical History:   Diagnosis Date   • Cancer (HCC)     prostate   • Colon polyp    • HL (hearing loss)    • Murmur, heart    • Obesity    • Patient denies medical problems      Past Surgical History:    Procedure Laterality Date   • APPENDECTOMY     • COLONOSCOPY     • CYST REMOVAL Right 2018    right index finger (knuckle)   • HAND SURGERY     • PROSTATECTOMY     • TONSILLECTOMY       Family History   Problem Relation Age of Onset   • Diabetes Mother    • Arthritis Mother    • Stroke Father    • Diabetes Sister    • Pancreatic cancer Paternal Aunt      Social History     Socioeconomic History   • Marital status: /Civil Union     Spouse name: Not on file   • Number of children: Not on file   • Years of education: Not on file   • Highest education level: Not on file   Occupational History   • Not on file   Tobacco Use   • Smoking status: Never     Passive exposure: Never   • Smokeless tobacco: Never   Vaping Use   • Vaping status: Never Used   Substance and Sexual Activity   • Alcohol use: Not Currently     Comment: 1 beer per month   • Drug use: Never   • Sexual activity: Yes     Partners: Female     Birth control/protection: Male Sterilization, None   Other Topics Concern   • Not on file   Social History Narrative   • Not on file     Social Determinants of Health     Financial Resource Strain: Not on file   Food Insecurity: No Food Insecurity (3/11/2024)    Hunger Vital Sign    • Worried About Running Out of Food in the Last Year: Never true    • Ran Out of Food in the Last Year: Never true   Transportation Needs: No Transportation Needs (3/11/2024)    PRAPARE - Transportation    • Lack of Transportation (Medical): No    • Lack of Transportation (Non-Medical): No   Physical Activity: Not on file   Stress: Not on file   Social Connections: Not on file   Intimate Partner Violence: Not on file   Housing Stability: Low Risk  (3/11/2024)    Housing Stability Vital Sign    • Unable to Pay for Housing in the Last Year: No    • Number of Places Lived in the Last Year: 1    • Unstable Housing in the Last Year: No       Current Outpatient Medications:   •  Multiple Vitamins-Minerals (Multi For Him 50+) TABS, Take  by mouth, Disp: , Rfl:   •  pancrelipase, Lip-Prot-Amyl, (CREON) 12,000 units capsule, Take 2 tablets with meals and 1 with snacks, Disp: 90 capsule, Rfl: 3  •  rosuvastatin (Crestor) 5 mg tablet, , Disp: , Rfl:   •  tadalafil (CIALIS) 10 MG tablet, Take 1/2 or 1 tablet every 36 hours as needed for sexual activity, Disp: 10 tablet, Rfl: 3  •  azelastine (ASTELIN) 0.1 % nasal spray, 2 sprays into each nostril in the morning Use in each nostril as directed  ONLY WHEN NEEDED (Patient not taking: Reported on 3/19/2024), Disp: , Rfl:   •  ferrous sulfate 325 (65 Fe) mg tablet, 2 (two) times a day with meals (Patient not taking: Reported on 3/19/2024), Disp: , Rfl:   Allergies   Allergen Reactions   • Penicillins Other (See Comments)     Unsure as a child     Vitals:    03/19/24 1302   BP: 118/78   Pulse: 60   Resp: 15   Temp: (!) 95.4 °F (35.2 °C)   SpO2: 99%       Physical Exam  Constitutional: General appearance: The Patient is well-developed and well-nourished who appears the stated age in no acute distress. Patient is pleasant and talkative.     HEENT:  Normocephalic.  Sclerae are anicteric. Mucous membranes are moist. Neck is supple without adenopathy. No JVD.     Chest: The lungs are clear to auscultation.     Cardiac: Heart is regular rate.     Abdomen: Abdomen is soft, non-tender, non-distended and without masses.     Extremities: There is no clubbing or cyanosis. There is no edema.  Symmetric.  Neuro: Grossly nonfocal. Gait is normal.     Lymphatic: No evidence of cervical adenopathy bilaterally.   No evidence of axillary adenopathy bilaterally. No evidence of inguinal adenopathy bilaterally.     Skin: Warm, anicteric.    Psych:  Patient is pleasant and talkative.  Breasts:        Pathology:  Final Diagnosis   A.B.Pancreas, Head, (Cell Block and smear preparations):  Malignant  Adenocarcinoma.     Satisfactory for evaluation.     Note: As reported in the “WHO Reporting System for Pancreaticobiliary Cytopathology  *” the diagnostic category of “malignant” carries a % risk of malignancy being found in subsequent FNAB or resection.  The usual management following an initial diagnosis of “malignant” is per clinical stage. Ultimately clinical and radiologic correlation is needed for this patient in arriving at the actual management plan.      *Stephan BRAGA, Field MOODY, Ashley I (Eds). (2022). WHO Reporting System for Pancreaticobiliary Cytopathology. [facility].  FNAB - fine needle aspiration/biopsy     Intradepartmental consultation is in agreement.     Dr. Apple Cantrell MD is notified of the diagnosis in [employer] via [email or web address] on 3/15/2024 .   Electronically signed by Toya Chong MD on 3/15/2024 at 10:15 AM       Labs:      Component  Ref Range & Units 3/11/24 11:39 AM   CA 19-9  0 - 35 U/mL 859 High           Imaging  FL ERCP biliary and pancreatic    Result Date: 3/18/2024  Narrative: ERCP INDICATION: K86.89: Other specified diseases of pancreas. COMPARISON: March 10, 2024 IMAGES:  5 FLUOROSCOPY TIME:   3.21 minutes CONTRAST: 16 mL of iohexol (OMNIPAQUE) FINDINGS: Fluoroscopic guidance was provided for performance of ERCP. BILIARY: Limited contrast opacified static submitted images were obtained as part of fluoroscopic guidance. Limited images depict intrahepatic biliary dilatation and narrowing in the common duct with final image demonstrating metallic biliary stent. Metallic biliary stent is narrowed at its mid to distal portion. PANCREATIC: A wire is present within the pancreatic duct but no significant contrast opacification of pancreatic duct is demonstrated.     Impression: Fluoroscopic guidance for ERCP.  Please see procedure report for full details. Workstation performed: [id number]     XA REFERENCE IMPORT    Result Date: 3/14/2024  Narrative: This study is being used for reference films only. There is no [facility] interpretation for this study.    CT chest wo contrast    Result Date: 3/14/2024  Narrative: This study is being used for  reference films only. There is no [facility] interpretation for this study.    CTA abdomen pelvis w wo contrast    Result Date: 3/14/2024  Narrative: This study is being used for reference films only. There is no [facility] interpretation for this study.    ERCP Fluoro    Addendum Date: 3/13/2024 Addendum:   [facility] [address] [facility] [address] [phone] DATE OF SERVICE: 3/13/24 PHYSICIAN(S): Attending: Apple Cantrell MD Fellow: No Staff Documented INDICATION: Pancreatic mass POST-OP DIAGNOSIS: See the impression below. PREPROCEDURE: Informed consent was obtained for the procedure, including sedation.  Risks of perforation, hemorrhage, adverse drug reaction and aspiration were discussed. The patient was placed in the left lateral decubitus position. Patient was explained about the risks and benefits of the procedure. Risks including but not limited to bleeding, infection, and perforation were explained in detail. Also explained about less than 100% sensitivity with the exam and other alternatives. PROCEDURE: ERCP DETAILS OF PROCEDURE: Patient was taken to the procedure room where a time out was performed to confirm correct patient and correct procedure. The patient underwent general anesthesia, which was administered by an anesthesia professional. The patient's blood pressure, heart rate, level of consciousness, respirations, oxygen, ECG and ETCO2 were monitored throughout the procedure. The scope was introduced through the mouth. Clinical intention was achieved. The patient experienced no blood loss. The procedure was not difficult. The patient tolerated the procedure well. There were no apparent adverse events. ANESTHESIA INFORMATION: ASA: II Anesthesia Type: General MEDICATIONS: indomethacin (INDOCIN) rectal suppository 100 mg (Totals for administrations occurring from 1530 to 1650 on 03/13/24) FINDINGS: The common bile duct and pancreatic duct were deeply cannulated using a traction  "sphincterotome with 260 cm x 0.035\" straight guidewire by employing a double guidewire technique. Cannulation was difficult and no bleeding was observed. . Initial attempt at cannulation using wire guided technique resulted in what appeared to be the trajectory of the common bile duct. Contrast was injected. Bile duct was not opacified and appeared extraluminal space was opacifying.  As much contrast as possible was aspirated and re-attempt to cannulate resulted in pancreatic duct cannulation.  The bile duct was then instantly cannulated I personally reviewed and interpreted the fluoroscopy images. The common bile duct and hepatic duct was dilated up to 10 mm.  There was s 3-4 cm stricture in the distal common bile duct. Medium-sized major papilla sphincterotomy was performed using a sphincterotome. No bleeding was noted at the procedure site. Multiple sweeps were performed in the proximal common bile duct using a 12 mm balloon. Debris was removed, achieving complete clearance. 10 mm x 6 cm fully covered metal stent was placed successfully in the common bile duct 5 Fr x 4 cm single pigtail plastic stent was placed in the pancreatic duct SPECIMENS: ID Type Source Tests Collected by Time Destination 1 : pancreatic head mass, FNA FNA FNA FINE NEEDLE ASPIRATION/BIOPSY Apple Cantrell MD 3/13/2024  3:52 PM   IMPRESSION: The common bile duct and pancreatic duct were deeply cannulated by employing a double guidewire technique. Major papilla sphincterotomy was performed. Multiple sweeps were performed in the proximal common bile duct. Debris was removed. 10 mm x 6 cm fully covered stent was placed in the common bile duct 5 Fr x 4 cm straight single pigtail stent was placed in the pancreatic duct RECOMMENDATION: Follow up LFTs Abdominal x-ray/KUB in 7-10 days to determine if pancreatic duct stent has spontaneously passed.  If this remains will need upper endoscopy to remove the stent. Apple Cantrell MD Suggested CPT codes: " "87540, 43274x2    Result Date: 3/13/2024  Narrative: Table formatting from the original result was not included. [facility] End[facility] [address] [facility] [address] [phone] DATE OF SERVICE: 3/13/24 PHYSICIAN(S): Attending: Apple Cantrell MD Fellow: No Staff Documented INDICATION: Pancreatic mass POST-OP DIAGNOSIS: See the impression below. PREPROCEDURE: Informed consent was obtained for the procedure, including sedation.  Risks of perforation, hemorrhage, adverse drug reaction and aspiration were discussed. The patient was placed in the left lateral decubitus position. Patient was explained about the risks and benefits of the procedure. Risks including but not limited to bleeding, infection, and perforation were explained in detail. Also explained about less than 100% sensitivity with the exam and other alternatives. PROCEDURE: ERCP DETAILS OF PROCEDURE: Patient was taken to the procedure room where a time out was performed to confirm correct patient and correct procedure. The patient underwent general anesthesia, which was administered by an anesthesia professional. The patient's blood pressure, heart rate, level of consciousness, respirations, oxygen, ECG and ETCO2 were monitored throughout the procedure. The scope was introduced through the mouth. Clinical intention was achieved. The patient experienced no blood loss. The procedure was not difficult. The patient tolerated the procedure well. There were no apparent adverse events. ANESTHESIA INFORMATION: ASA: II Anesthesia Type: General MEDICATIONS: indomethacin (INDOCIN) rectal suppository 100 mg (Totals for administrations occurring from 1530 to 1650 on 03/13/24) FINDINGS: The common bile duct and pancreatic duct were deeply cannulated using a traction sphincterotome with 260 cm x 0.035\" straight guidewire by employing a double guidewire technique. Cannulation was difficult and no bleeding was observed. . Initial attempt at " cannulation using wire guided technique resulted in what appeared to be the trajectory of the common bile duct. Contrast was injected. Bile duct was not opacified and appeared extraluminal space was opacifying.  As much contrast as possible was aspirated and re-attempt to cannulate resulted in pancreatic duct cannulation.  The bile duct was then instantly cannulated I personally reviewed and interpreted the fluoroscopy images. The common bile duct and hepatic duct was dilated up to 10 mm.  There was s 3-4 cm stricture in the distal common bile duct. Medium-sized major papilla sphincterotomy was performed using a sphincterotome. No bleeding was noted at the procedure site. Multiple sweeps were performed in the proximal common bile duct using a 12 mm balloon. Debris was removed, achieving complete clearance. 10 mm x 6 cm fully covered metal stent was placed successfully in the common bile duct 5 Fr x 4 cm single pigtail plastic stent was placed in the pancreatic duct SPECIMENS: ID Type Source Tests Collected by Time Destination 1 : pancreatic head mass, FNA FNA FNA FINE NEEDLE ASPIRATION/BIOPSY Apple Cantrell MD 3/13/2024  3:52 PM       Impression: The common bile duct and pancreatic duct were deeply cannulated by employing a double guidewire technique. Major papilla sphincterotomy was performed. Multiple sweeps were performed in the proximal common bile duct. Debris was removed. 10 mm x 6 cm fully covered stent was placed in the common bile duct 5 Fr x 4 cm straight single pigtail stent was placed in the pancreatic duct RECOMMENDATION: Follow up LFTs Abdominal x-ray/KUB in 7-10 days to determine if pancreatic duct stent has spontaneously passed.  If this remains will need upper endoscopy to remove the stent. Apple Cantrell MD     Endoscopic ultrasonography, GI (Upper)    Result Date: 3/13/2024  Narrative: Table formatting from the original result was not included. [facility] [address]  [facility] [address] [phone] DATE OF SERVICE: 3/13/24 PHYSICIAN(S): Attending: Apple Cantrell MD Fellow: No Staff Documented INDICATION: Pancreatic mass POST-OP DIAGNOSIS: See the impression below. PREPROCEDURE: Informed consent was obtained for the procedure, including sedation.  Risks of perforation, hemorrhage, adverse drug reaction and aspiration were discussed. The patient was placed in the left lateral decubitus position. Patient was explained about the risks and benefits of the procedure. Risks including but not limited to bleeding, infection, and perforation were explained in detail. Also explained about less than 100% sensitivity with the exam and other alternatives. PROCEDURE: EUS UPPER DETAILS OF PROCEDURE: Patient was taken to the procedure room where a time out was performed to confirm correct patient and correct procedure. The patient underwent monitored anesthesia care, which was administered by an anesthesia professional. The patient's blood pressure, heart rate, oxygen, respirations, level of consciousness, ECG and ETCO2 were monitored throughout the procedure. The endoscope and linear scope were introduced through the mouth and advanced to the duodenum. The patient experienced no blood loss. The procedure was not difficult. The patient tolerated the procedure well. There were no apparent adverse events. ANESTHESIA INFORMATION: ASA: II Anesthesia Type: IV Sedation with Anesthesia MEDICATIONS: No administrations occurring from 1530 to 1602 on 03/13/24 FINDINGS: Normal celiac takeoff.  The left kidney, left adrenal, and spleen appeared normal. The pancreatic duct measured 3 mm at the body of the pancreas, and 3 mm at the tail of the pancreas.  There was abrupt cutoff of the bile duct at the head of the pancreas with upstream dilation. The bile duct measured 9 mm. Irregular and hypoechoic mass measuring 34 mm x 31 mm with poorly defined margins was visualized in the pancreas; 3 successful fine  needle biopsy passes were taken with a 22 gauge [employer] needle using a transduodenal approach guided by Doppler. An adequate sample was obtained. Cytology analysis was performed. Onsite cytologist was present and cytology results were preliminarily malignant SPECIMENS: ID Type Source Tests Collected by Time Destination 1 : pancreatic head mass, FNA FNA FNA FINE NEEDLE ASPIRATION/BIOPSY Apple Cantrell MD 3/13/2024  3:52 PM       Impression: Irregular and hypoechoic mass measuring 34 mm x 31 mm with poorly defined margins was visualized in the pancreas; 3 fine needle biopsy passes were taken. An adequate sample was obtained. Cytology analysis was performed. Onsite cytologist was present and cytology results were preliminarily malignant. No vessel involvement identified. RECOMMENDATION: Follow up biopsy results Proceed with ERCP  pAple Cantrell MD     CT chest wo contrast    Result Date: 3/11/2024  Narrative: CT CHEST WITHOUT IV CONTRAST INDICATION: Pancreatic mass, rule out mets/staging. COMPARISON: No prior chest study, CT abdomen and pelvis 3/10/2024 TECHNIQUE: CT examination of the chest was performed without intravenous contrast. Multiplanar 2D reformatted images were created from the source data. Coronal MIP images of the chest were also provided. This examination, like all CT scans performed in the [facility], was performed utilizing techniques to minimize radiation dose exposure, including the use of iterative reconstruction and automated exposure control. Radiation dose length product (DLP) for this visit: 431.93 mGy-cm FINDINGS: LUNGS: Minor hypoventilatory changes. No consolidation. 1 mm left upper lobe nodule (303/61). 1 mm calcified right lower lobe granuloma suspected (303/153). AIRWAYS: No significant filling defect. PLEURA: Unremarkable. HEART/GREAT VESSELS: Heart is unremarkable for patient's age. Mild coronary artery calcifications. No thoracic aortic aneurysm. MEDIASTINUM  AND KOKO: Unremarkable. CHEST WALL AND LOWER NECK: Unremarkable. VISUALIZED STRUCTURES IN THE UPPER ABDOMEN: Faint wedge-shaped hypodensity in the posterior upper pole right kidney, nonspecific but could represent hemorrhagic/proteinaceous cyst. Previously described pancreatic head mass is not well visualized on this study although there is persistent mild biliary and pancreatic duct dilatation evident. OSSEOUS STRUCTURES: No acute fracture or destructive osseous lesion. Degenerative changes of the spine.     Impression: 1. 1 mm left upper lobe nodule. Follow-up CT chest in 3 months recommended to assure stability and exclude developing early metastasis. 2. Persistent mild biliary and pancreatic duct dilatation due to suspected pancreatic head mass which is not well visualized on this study. 3. Mild coronary artery calcification in keeping with atherosclerotic heart disease. The study was marked in EPIC for follow-up. Workstation performed: [id number]     CT abdomen pelvis with contrast    Result Date: 3/10/2024  Narrative: CT ABDOMEN AND PELVIS WITH IV CONTRAST INDICATION: abdominal pain. COMPARISON: None. TECHNIQUE: CT examination of the abdomen and pelvis was performed. Multiplanar 2D reformatted images were created from the source data. This examination, like all CT scans performed in the [facility], was performed utilizing techniques to minimize radiation dose exposure, including the use of iterative reconstruction and automated exposure control. Radiation dose length product (DLP) for this visit: 507 mGy-cm IV Contrast: 100 mL of iohexol (OMNIPAQUE) Enteric Contrast: Not administered. FINDINGS: ABDOMEN LOWER CHEST: No clinically significant abnormality in the visualized lower chest. LIVER/BILIARY TREE: Liver is normal in size and contour without enhancing mass. There is mild intrahepatic biliary ductal dilatation. CBD is dilated measuring up to 14 mm in diameter. No calcified  choledocholithiasis. GALLBLADDER: Distended gallbladder without abnormal wall thickening or calcified gallstones. No pericholecystic inflammatory change. SPLEEN: Unremarkable. PANCREAS: Diffuse parenchymal atrophy of the pancreas noted with dilated main pancreatic duct measuring up to 7 mm in diameter. There is an ill-defined 1.5 x 1.2 x 2.1 cm heterogeneously enhancing hypodense mass in the pancreatic head/uncinate process region noted, likely the cause of biliary and pancreatic ductal dilatation. No peripancreatic inflammatory stranding or fluid collections. There are a few nonspecific peripancreatic subcentimeter mesenteric lymph nodes. No pathological adenopathy by size  criteria. ADRENAL GLANDS: Unremarkable. KIDNEYS/URETERS: Unremarkable. No hydronephrosis. STOMACH AND BOWEL: Stomach is moderately distended with air and heterogeneous density debris. The small and large bowel loops are normal in course and caliber without obstruction or inflammation. Terminal ileum is grossly unremarkable. Appendix is not definitively visualized but no pericecal inflammatory changes noted. Increased air and stool in the colon suggesting mild constipation. APPENDIX: No findings to suggest appendicitis. ABDOMINOPELVIC CAVITY: No ascites. No pneumoperitoneum. No lymphadenopathy. VESSELS: Atherosclerosis without abdominal aortic aneurysm. PELVIS REPRODUCTIVE ORGANS: Unremarkable for patient's age. URINARY BLADDER: Unremarkable. ABDOMINAL WALL/INGUINAL REGIONS: Nonspecific subcentimeter inguinal lymph nodes are seen bilaterally. No subcutaneous mass or fluid collections.. BONES: No acute fracture or suspicious osseous lesion. Mild multilevel degenerative changes of the thoracolumbar spine worse at the L4-5 level.     Impression: 1.  Ill-defined 1.5 x 1.2 x 2.1 cm heterogeneously enhancing hypodense mass in the pancreatic head/uncinate process region noted with associated biliary and pancreatic ductal dilatation. This is highly  concerning for adenocarcinoma of the pancreas. A few  nonspecific subcentimeter upper abdominal peripancreatic mesenteric lymph nodes are seen. 2.  Mild colonic constipation. No evidence for bowel obstruction, inflammation, obstructive uropathy, free air, or free fluid. Workstation performed: [id number]     I personally reviewed and interpreted the above laboratory and imaging data.    Discussion/Summary: 63-year-old male with a clinical T2 N0 M0 pancreas cancer.  His staging workup is negative for metastasis.  The lung nodule is likely benign and will need to be followed with a repeat 3-month chest CT.  I will plan on repeating the CA 19-9 now since this was obtained when he was hospitalized and jaundiced.  We discussed that he would likely require combination of surgery and chemotherapy.  We discussed the reasoning and rationale behind neoadjuvant chemotherapy.  We discussed that if there is progression through chemotherapy, operative intervention will not be beneficial.  I would recommend at least 3 months of neoadjuvant chemotherapy.  We also discussed

## 2024-03-19 NOTE — PROGRESS NOTES
Surgical Oncology Follow Up       [facility] [place]  [address] [facility] [address]  [phone]    Brenton Saez  1960  [id number]  [facility] [place]  [address] [facility] [address]  [phone]    Diagnoses and all orders for this visit:    Adenocarcinoma of head of pancreas (HCC)  -     Cancer antigen 19-9; Future  -     pancrelipase, Lip-Prot-Amyl, (CREON) 12,000 units capsule; Take 2 tablets with meals and 1 with snacks  -     Cancer antigen 19-9        Chief Complaint   Patient presents with    Follow-up       No follow-ups on file.      Oncology History   Adenocarcinoma of head of pancreas (HCC)   3/13/2024 Biopsy    EUS/ERCP:  Pancreas, Head, (Cell Block and smear preparations):  Malignant  Adenocarcinoma.     3/15/2024 Initial Diagnosis    Adenocarcinoma of head of pancreas (HCC)         Staging: [id number] pancreatic adenocarcinoma, March 2024  Treatment history:    Current treatment:    Disease status:      History of Present Illness: Patient returns after his hospitalization when he presented with obstructive jaundice.  EUS revealed a 3 cm mass.  This was stented.  Pathology revealed adenocarcinoma.  He comes in now to discuss further therapy.  He is feeling well except for some diarrhea.  He does have bloating and flatulence.  He has lost weight since his hospitalization.    Review of Systems  Complete ROS Surg Onc:   Complete ROS Surg Onc:   Constitutional: The patient has weight loss  Eyes: No complaints of visual problems, no scleral icterus.   ENT: no complaints of ear pain, no hoarseness, no difficulty swallowing,  no tinnitus and no new masses in head, oral cavity, or neck.   Cardiovascular: No complaints of chest pain, no palpitations, no ankle edema.   Respiratory: No complaints of shortness of breath, no cough.    Gastrointestinal: No further jaundice or rainer stool.  Genitourinary: No complaints of dysuria, no hematuria, no nocturia, no frequent urination, no urethral discharge.   Musculoskeletal: No complaints of weakness, paralysis, joint stiffness or arthralgias.  Integumentary: No complaints of rash, no new lesions.   Neurological: No complaints of convulsions, no seizures, no dizziness.   Hematologic/Lymphatic: No complaints of easy bruising.   Endocrine:  No hot or cold intolerance.  No polydipsia, polyphagia, or polyuria.  Allergy/immunology:  No environmental allergies.  No food allergies.  Not immunocompromised.  Skin:  No pallor or rash.  No wound.        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    Hyperglycemia    Wears hearing aid in left ear    LFT elevation    Pancreatic mass concerning for pancreatic CA    Slow transit constipation    IFG (impaired fasting glucose)    Adenocarcinoma of head of pancreas (HCC)     Past Medical History:   Diagnosis Date    Cancer (HCC)     prostate    Colon polyp     HL (hearing loss)     Murmur, heart     Obesity     Patient denies medical problems      Past Surgical History:   Procedure Laterality Date    APPENDECTOMY      COLONOSCOPY      CYST REMOVAL Right 2018    right index finger (knuckle)    HAND SURGERY      PROSTATECTOMY      TONSILLECTOMY       Family History   Problem Relation Age of Onset    Diabetes Mother     Arthritis Mother     Stroke Father     Diabetes Sister     Pancreatic cancer Paternal Aunt      Social History     Socioeconomic History    Marital status: /Civil Union     Spouse name: Not on file    Number of children: Not on file    Years of education: Not on file    Highest education level: Not on file    Occupational History    Not on file   Tobacco Use    Smoking status: Never     Passive exposure: Never    Smokeless tobacco: Never   Vaping Use    Vaping status: Never Used   Substance and Sexual Activity    Alcohol use: Not Currently     Comment: 1 beer per month    Drug use: Never    Sexual activity: Yes     Partners: Female     Birth control/protection: Male Sterilization, None   Other Topics Concern    Not on file   Social History Narrative    Not on file     Social Determinants of Health     Financial Resource Strain: Not on file   Food Insecurity: No Food Insecurity (3/11/2024)    Hunger Vital Sign     Worried About Running Out of Food in the Last Year: Never true     Ran Out of Food in the Last Year: Never true   Transportation Needs: No Transportation Needs (3/11/2024)    PRAPARE - Transportation     Lack of Transportation (Medical): No     Lack of Transportation (Non-Medical): No   Physical Activity: Not on file   Stress: Not on file   Social Connections: Not on file   Intimate Partner Violence: Not on file   Housing Stability: Low Risk  (3/11/2024)    Housing Stability Vital Sign     Unable to Pay for Housing in the Last Year: No     Number of Places Lived in the Last Year: 1     Unstable Housing in the Last Year: No       Current Outpatient Medications:     Multiple Vitamins-Minerals (Multi For Him 50+) TABS, Take by mouth, Disp: , Rfl:     pancrelipase, Lip-Prot-Amyl, (CREON) 12,000 units capsule, Take 2 tablets with meals and 1 with snacks, Disp: 90 capsule, Rfl: 3    rosuvastatin (Crestor) 5 mg tablet, , Disp: , Rfl:     tadalafil (CIALIS) 10 MG tablet, Take 1/2 or 1 tablet every 36 hours as needed for sexual activity, Disp: 10 tablet, Rfl: 3    azelastine (ASTELIN) 0.1 % nasal spray, 2 sprays into each nostril in the morning Use in each nostril as directed  ONLY WHEN NEEDED (Patient not taking: Reported on 3/19/2024), Disp: , Rfl:     ferrous sulfate 325 (65 Fe) mg tablet, 2 (two) times a day with  meals (Patient not taking: Reported on 3/19/2024), Disp: , Rfl:   Allergies   Allergen Reactions    Penicillins Other (See Comments)     Unsure as a child     Vitals:    03/19/24 1302   BP: 118/78   Pulse: 60   Resp: 15   Temp: (!) 95.4 °F (35.2 °C)   SpO2: 99%       Physical Exam  Constitutional: General appearance: The Patient is well-developed and well-nourished who appears the stated age in no acute distress. Patient is pleasant and talkative.     HEENT:  Normocephalic.  Sclerae are anicteric. Mucous membranes are moist. Neck is supple without adenopathy. No JVD.     Chest: The lungs are clear to auscultation.     Cardiac: Heart is regular rate.     Abdomen: Abdomen is soft, non-tender, non-distended and without masses.     Extremities: There is no clubbing or cyanosis. There is no edema.  Symmetric.  Neuro: Grossly nonfocal. Gait is normal.     Lymphatic: No evidence of cervical adenopathy bilaterally.   No evidence of axillary adenopathy bilaterally. No evidence of inguinal adenopathy bilaterally.     Skin: Warm, anicteric.    Psych:  Patient is pleasant and talkative.  Breasts:        Pathology:  Final Diagnosis   A.B.Pancreas, Head, (Cell Block and smear preparations):  Malignant  Adenocarcinoma.     Satisfactory for evaluation.     Note: As reported in the “WHO Reporting System for Pancreaticobiliary Cytopathology *” the diagnostic category of “malignant” carries a % risk of malignancy being found in subsequent FNAB or resection.  The usual management following an initial diagnosis of “malignant” is per clinical stage. Ultimately clinical and radiologic correlation is needed for this patient in arriving at the actual management plan.      *Stephan BRAGA, Field MOODY, Ashley I (Eds). (2022). WHO Reporting System for Pancreaticobiliary Cytopathology. [facility].  FNAB - fine needle aspiration/biopsy     Intradepartmental consultation is in agreement.     Dr. Apple Cantrell MD is notified of the diagnosis in [facility] via  [employer] on 3/15/2024 .   Electronically signed by Toya Chong MD on 3/15/2024 at 10:15 AM       Labs:      Component  Ref Range & Units 3/11/24 11:39 AM   CA 19-9  0 - 35 U/mL 859 High           Imaging  FL ERCP biliary and pancreatic    Result Date: 3/18/2024  Narrative: ERCP INDICATION: K86.89: Other specified diseases of pancreas. COMPARISON: March 10, 2024 IMAGES:  5 FLUOROSCOPY TIME:   3.21 minutes CONTRAST: 16 mL of iohexol (OMNIPAQUE) FINDINGS: Fluoroscopic guidance was provided for performance of ERCP. BILIARY: Limited contrast opacified static submitted images were obtained as part of fluoroscopic guidance. Limited images depict intrahepatic biliary dilatation and narrowing in the common duct with final image demonstrating metallic biliary stent. Metallic biliary stent is narrowed at its mid to distal portion. PANCREATIC: A wire is present within the pancreatic duct but no significant contrast opacification of pancreatic duct is demonstrated.     Impression: Fluoroscopic guidance for ERCP.  Please see procedure report for full details. Workstation performed: [id number]     XA REFERENCE IMPORT    Result Date: 3/14/2024  Narrative: This study is being used for reference films only. There is no [facility] interpretation for this study.    CT chest wo contrast    Result Date: 3/14/2024  Narrative: This study is being used for reference films only. There is no [facility] interpretation for this study.    CTA abdomen pelvis w wo contrast    Result Date: 3/14/2024  Narrative: This study is being used for reference films only. There is no [facility] interpretation for this study.    ERCP Fluoro    Addendum Date: 3/13/2024 Addendum:   [facility] [address] [facility] [address] [phone] DATE OF SERVICE: 3/13/24 PHYSICIAN(S): Attending: Apple Cantrell MD Fellow: No Staff Documented INDICATION: Pancreatic mass POST-OP DIAGNOSIS: See the impression below. PREPROCEDURE: Informed consent was obtained for  "the procedure, including sedation.  Risks of perforation, hemorrhage, adverse drug reaction and aspiration were discussed. The patient was placed in the left lateral decubitus position. Patient was explained about the risks and benefits of the procedure. Risks including but not limited to bleeding, infection, and perforation were explained in detail. Also explained about less than 100% sensitivity with the exam and other alternatives. PROCEDURE: ERCP DETAILS OF PROCEDURE: Patient was taken to the procedure room where a time out was performed to confirm correct patient and correct procedure. The patient underwent general anesthesia, which was administered by an anesthesia professional. The patient's blood pressure, heart rate, level of consciousness, respirations, oxygen, ECG and ETCO2 were monitored throughout the procedure. The scope was introduced through the mouth. Clinical intention was achieved. The patient experienced no blood loss. The procedure was not difficult. The patient tolerated the procedure well. There were no apparent adverse events. ANESTHESIA INFORMATION: ASA: II Anesthesia Type: General MEDICATIONS: indomethacin (INDOCIN) rectal suppository 100 mg (Totals for administrations occurring from 1530 to 1650 on 03/13/24) FINDINGS: The common bile duct and pancreatic duct were deeply cannulated using a traction sphincterotome with 260 cm x 0.035\" straight guidewire by employing a double guidewire technique. Cannulation was difficult and no bleeding was observed. . Initial attempt at cannulation using wire guided technique resulted in what appeared to be the trajectory of the common bile duct. Contrast was injected. Bile duct was not opacified and appeared extraluminal space was opacifying.  As much contrast as possible was aspirated and re-attempt to cannulate resulted in pancreatic duct cannulation.  The bile duct was then instantly cannulated I personally reviewed and interpreted the fluoroscopy " images. The common bile duct and hepatic duct was dilated up to 10 mm.  There was s 3-4 cm stricture in the distal common bile duct. Medium-sized major papilla sphincterotomy was performed using a sphincterotome. No bleeding was noted at the procedure site. Multiple sweeps were performed in the proximal common bile duct using a 12 mm balloon. Debris was removed, achieving complete clearance. 10 mm x 6 cm fully covered metal stent was placed successfully in the common bile duct 5 Fr x 4 cm single pigtail plastic stent was placed in the pancreatic duct SPECIMENS: ID Type Source Tests Collected by Time Destination 1 : pancreatic head mass, FNA FNA FNA FINE NEEDLE ASPIRATION/BIOPSY Apple Cantrell MD 3/13/2024  3:52 PM   IMPRESSION: The common bile duct and pancreatic duct were deeply cannulated by employing a double guidewire technique. Major papilla sphincterotomy was performed. Multiple sweeps were performed in the proximal common bile duct. Debris was removed. 10 mm x 6 cm fully covered stent was placed in the common bile duct 5 Fr x 4 cm straight single pigtail stent was placed in the pancreatic duct RECOMMENDATION: Follow up LFTs Abdominal x-ray/KUB in 7-10 days to determine if pancreatic duct stent has spontaneously passed.  If this remains will need upper endoscopy to remove the stent. Apple Cantrell MD Suggested CPT codes: 27593, 43274x2    Result Date: 3/13/2024  Narrative: Table formatting from the original result was not included. [facility] [address] [facility] [address] [phone] DATE OF SERVICE: 3/13/24 PHYSICIAN(S): Attending: Apple Cantrell MD Fellow: No Staff Documented INDICATION: Pancreatic mass POST-OP DIAGNOSIS: See the impression below. PREPROCEDURE: Informed consent was obtained for the procedure, including sedation.  Risks of perforation, hemorrhage, adverse drug reaction and aspiration were discussed. The patient was placed in the left lateral decubitus  "position. Patient was explained about the risks and benefits of the procedure. Risks including but not limited to bleeding, infection, and perforation were explained in detail. Also explained about less than 100% sensitivity with the exam and other alternatives. PROCEDURE: ERCP DETAILS OF PROCEDURE: Patient was taken to the procedure room where a time out was performed to confirm correct patient and correct procedure. The patient underwent general anesthesia, which was administered by an anesthesia professional. The patient's blood pressure, heart rate, level of consciousness, respirations, oxygen, ECG and ETCO2 were monitored throughout the procedure. The scope was introduced through the mouth. Clinical intention was achieved. The patient experienced no blood loss. The procedure was not difficult. The patient tolerated the procedure well. There were no apparent adverse events. ANESTHESIA INFORMATION: ASA: II Anesthesia Type: General MEDICATIONS: indomethacin (INDOCIN) rectal suppository 100 mg (Totals for administrations occurring from 1530 to 1650 on 03/13/24) FINDINGS: The common bile duct and pancreatic duct were deeply cannulated using a traction sphincterotome with 260 cm x 0.035\" straight guidewire by employing a double guidewire technique. Cannulation was difficult and no bleeding was observed. . Initial attempt at cannulation using wire guided technique resulted in what appeared to be the trajectory of the common bile duct. Contrast was injected. Bile duct was not opacified and appeared extraluminal space was opacifying.  As much contrast as possible was aspirated and re-attempt to cannulate resulted in pancreatic duct cannulation.  The bile duct was then instantly cannulated I personally reviewed and interpreted the fluoroscopy images. The common bile duct and hepatic duct was dilated up to 10 mm.  There was s 3-4 cm stricture in the distal common bile duct. Medium-sized major papilla sphincterotomy was " performed using a sphincterotome. No bleeding was noted at the procedure site. Multiple sweeps were performed in the proximal common bile duct using a 12 mm balloon. Debris was removed, achieving complete clearance. 10 mm x 6 cm fully covered metal stent was placed successfully in the common bile duct 5 Fr x 4 cm single pigtail plastic stent was placed in the pancreatic duct SPECIMENS: ID Type Source Tests Collected by Time Destination 1 : pancreatic head mass, FNA FNA FNA FINE NEEDLE ASPIRATION/BIOPSY Apple Cantrell MD 3/13/2024  3:52 PM       Impression: The common bile duct and pancreatic duct were deeply cannulated by employing a double guidewire technique. Major papilla sphincterotomy was performed. Multiple sweeps were performed in the proximal common bile duct. Debris was removed. 10 mm x 6 cm fully covered stent was placed in the common bile duct 5 Fr x 4 cm straight single pigtail stent was placed in the pancreatic duct RECOMMENDATION: Follow up LFTs Abdominal x-ray/KUB in 7-10 days to determine if pancreatic duct stent has spontaneously passed.  If this remains will need upper endoscopy to remove the stent. Apple Cantrell MD     Endoscopic ultrasonography, GI (Upper)    Result Date: 3/13/2024  Narrative: Table formatting from the original result was not included. [facility] [address] [facility] [address] [phone] DATE OF SERVICE: 3/13/24 PHYSICIAN(S): Attending: Apple Cantrell MD Fellow: No Staff Documented INDICATION: Pancreatic mass POST-OP DIAGNOSIS: See the impression below. PREPROCEDURE: Informed consent was obtained for the procedure, including sedation.  Risks of perforation, hemorrhage, adverse drug reaction and aspiration were discussed. The patient was placed in the left lateral decubitus position. Patient was explained about the risks and benefits of the procedure. Risks including but not limited to bleeding, infection, and perforation were explained in  detail. Also explained about less than 100% sensitivity with the exam and other alternatives. PROCEDURE: EUS UPPER DETAILS OF PROCEDURE: Patient was taken to the procedure room where a time out was performed to confirm correct patient and correct procedure. The patient underwent monitored anesthesia care, which was administered by an anesthesia professional. The patient's blood pressure, heart rate, oxygen, respirations, level of consciousness, ECG and ETCO2 were monitored throughout the procedure. The endoscope and linear scope were introduced through the mouth and advanced to the duodenum. The patient experienced no blood loss. The procedure was not difficult. The patient tolerated the procedure well. There were no apparent adverse events. ANESTHESIA INFORMATION: ASA: II Anesthesia Type: IV Sedation with Anesthesia MEDICATIONS: No administrations occurring from 1530 to 1602 on 03/13/24 FINDINGS: Normal celiac takeoff.  The left kidney, left adrenal, and spleen appeared normal. The pancreatic duct measured 3 mm at the body of the pancreas, and 3 mm at the tail of the pancreas.  There was abrupt cutoff of the bile duct at the head of the pancreas with upstream dilation. The bile duct measured 9 mm. Irregular and hypoechoic mass measuring 34 mm x 31 mm with poorly defined margins was visualized in the pancreas; 3 successful fine needle biopsy passes were taken with a 22 gauge [employer] needle using a transduodenal approach guided by Doppler. An adequate sample was obtained. Cytology analysis was performed. Onsite cytologist was present and cytology results were preliminarily malignant SPECIMENS: ID Type Source Tests Collected by Time Destination 1 : pancreatic head mass, FNA FNA FNA FINE NEEDLE ASPIRATION/BIOPSY Apple Cantrell MD 3/13/2024  3:52 PM       Impression: Irregular and hypoechoic mass measuring 34 mm x 31 mm with poorly defined margins was visualized in the pancreas; 3 fine needle biopsy passes  were taken. An adequate sample was obtained. Cytology analysis was performed. Onsite cytologist was present and cytology results were preliminarily malignant. No vessel involvement identified. RECOMMENDATION: Follow up biopsy results Proceed with ERCP  Apple Cantrell MD     CT chest wo contrast    Result Date: 3/11/2024  Narrative: CT CHEST WITHOUT IV CONTRAST INDICATION: Pancreatic mass, rule out mets/staging. COMPARISON: No prior chest study, CT abdomen and pelvis 3/10/2024 TECHNIQUE: CT examination of the chest was performed without intravenous contrast. Multiplanar 2D reformatted images were created from the source data. Coronal MIP images of the chest were also provided. This examination, like all CT scans performed in the [facility], was performed utilizing techniques to minimize radiation dose exposure, including the use of iterative reconstruction and automated exposure control. Radiation dose length product (DLP) for this visit: 431.93 mGy-cm FINDINGS: LUNGS: Minor hypoventilatory changes. No consolidation. 1 mm left upper lobe nodule (303/61). 1 mm calcified right lower lobe granuloma suspected (303/153). AIRWAYS: No significant filling defect. PLEURA: Unremarkable. HEART/GREAT VESSELS: Heart is unremarkable for patient's age. Mild coronary artery calcifications. No thoracic aortic aneurysm. MEDIASTINUM AND KOKO: Unremarkable. CHEST WALL AND LOWER NECK: Unremarkable. VISUALIZED STRUCTURES IN THE UPPER ABDOMEN: Faint wedge-shaped hypodensity in the posterior upper pole right kidney, nonspecific but could represent hemorrhagic/proteinaceous cyst. Previously described pancreatic head mass is not well visualized on this study although there is persistent mild biliary and pancreatic duct dilatation evident. OSSEOUS STRUCTURES: No acute fracture or destructive osseous lesion. Degenerative changes of the spine.     Impression: 1. 1 mm left upper lobe nodule. Follow-up CT chest in 3 months  recommended to assure stability and exclude developing early metastasis. 2. Persistent mild biliary and pancreatic duct dilatation due to suspected pancreatic head mass which is not well visualized on this study. 3. Mild coronary artery calcification in keeping with atherosclerotic heart disease. The study was marked in EPIC for follow-up. Workstation performed: [id number]     CT abdomen pelvis with contrast    Result Date: 3/10/2024  Narrative: CT ABDOMEN AND PELVIS WITH IV CONTRAST INDICATION: abdominal pain. COMPARISON: None. TECHNIQUE: CT examination of the abdomen and pelvis was performed. Multiplanar 2D reformatted images were created from the source data. This examination, like all CT scans performed in the [facility], was performed utilizing techniques to minimize radiation dose exposure, including the use of iterative reconstruction and automated exposure control. Radiation dose length product (DLP) for this visit: 507 mGy-cm IV Contrast: 100 mL of iohexol (OMNIPAQUE) Enteric Contrast: Not administered. FINDINGS: ABDOMEN LOWER CHEST: No clinically significant abnormality in the visualized lower chest. LIVER/BILIARY TREE: Liver is normal in size and contour without enhancing mass. There is mild intrahepatic biliary ductal dilatation. CBD is dilated measuring up to 14 mm in diameter. No calcified choledocholithiasis. GALLBLADDER: Distended gallbladder without abnormal wall thickening or calcified gallstones. No pericholecystic inflammatory change. SPLEEN: Unremarkable. PANCREAS: Diffuse parenchymal atrophy of the pancreas noted with dilated main pancreatic duct measuring up to 7 mm in diameter. There is an ill-defined 1.5 x 1.2 x 2.1 cm heterogeneously enhancing hypodense mass in the pancreatic head/uncinate process region noted, likely the cause of biliary and pancreatic ductal dilatation. No peripancreatic inflammatory stranding or fluid collections. There are a few nonspecific  peripancreatic subcentimeter mesenteric lymph nodes. No pathological adenopathy by size  criteria. ADRENAL GLANDS: Unremarkable. KIDNEYS/URETERS: Unremarkable. No hydronephrosis. STOMACH AND BOWEL: Stomach is moderately distended with air and heterogeneous density debris. The small and large bowel loops are normal in course and caliber without obstruction or inflammation. Terminal ileum is grossly unremarkable. Appendix is not definitively visualized but no pericecal inflammatory changes noted. Increased air and stool in the colon suggesting mild constipation. APPENDIX: No findings to suggest appendicitis. ABDOMINOPELVIC CAVITY: No ascites. No pneumoperitoneum. No lymphadenopathy. VESSELS: Atherosclerosis without abdominal aortic aneurysm. PELVIS REPRODUCTIVE ORGANS: Unremarkable for patient's age. URINARY BLADDER: Unremarkable. ABDOMINAL WALL/INGUINAL REGIONS: Nonspecific subcentimeter inguinal lymph nodes are seen bilaterally. No subcutaneous mass or fluid collections.. BONES: No acute fracture or suspicious osseous lesion. Mild multilevel degenerative changes of the thoracolumbar spine worse at the L4-5 level.     Impression: 1.  Ill-defined 1.5 x 1.2 x 2.1 cm heterogeneously enhancing hypodense mass in the pancreatic head/uncinate process region noted with associated biliary and pancreatic ductal dilatation. This is highly concerning for adenocarcinoma of the pancreas. A few  nonspecific subcentimeter upper abdominal peripancreatic mesenteric lymph nodes are seen. 2.  Mild colonic constipation. No evidence for bowel obstruction, inflammation, obstructive uropathy, free air, or free fluid. Workstation performed: [id number]     I personally reviewed and interpreted the above laboratory and imaging data.    Discussion/Summary: 63-year-old male with a clinical T2 N0 M0 pancreas cancer.  His staging workup is negative for metastasis.  The lung nodule is likely benign and will need to be followed with a repeat 3-month  chest CT.  I will plan on repeating the CA 19-9 now since this was obtained when he was hospitalized and jaundiced.  We discussed that he would likely require combination of surgery and chemotherapy.  We discussed the reasoning and rationale behind neoadjuvant chemotherapy.  We discussed that if there is progression through chemotherapy, operative intervention will not be beneficial.  I would recommend at least 3 months of neoadjuvant chemotherapy.  We also discussed that surgical literature suggests a 5 to 6 months of neoadjuvant chemotherapy will confer the best long-term survival as well.  We discussed he will likely need a port for chemotherapy.  We will obtain the CA 19-9 now.  I will give him a prescription for Creon.  He is getting another opinion regarding surgery.  Should he decide to have surgery here I will see him again in 3 months.  All of his and his wife's questions were answered.

## 2024-03-20 ENCOUNTER — TELEPHONE (OUTPATIENT)
Dept: HEMATOLOGY ONCOLOGY | Facility: CLINIC | Age: 64
End: 2024-03-20

## 2024-03-20 ENCOUNTER — OFFICE VISIT (OUTPATIENT)
Dept: FAMILY MEDICINE CLINIC | Facility: HOSPITAL | Age: 64
End: 2024-03-20
Payer: COMMERCIAL

## 2024-03-20 VITALS
BODY MASS INDEX: 29.98 KG/M2 | TEMPERATURE: 98 F | SYSTOLIC BLOOD PRESSURE: 128 MMHG | WEIGHT: 202.4 LBS | OXYGEN SATURATION: 99 % | DIASTOLIC BLOOD PRESSURE: 73 MMHG | HEART RATE: 69 BPM | HEIGHT: 69 IN

## 2024-03-20 DIAGNOSIS — K86.89 SECONDARY PANCREATIC INSUFFICIENCY: ICD-10-CM

## 2024-03-20 DIAGNOSIS — C25.0 ADENOCARCINOMA OF HEAD OF PANCREAS (HCC): Primary | ICD-10-CM

## 2024-03-20 DIAGNOSIS — M75.21 BICIPITAL TENDINITIS OF RIGHT SHOULDER: ICD-10-CM

## 2024-03-20 DIAGNOSIS — Z23 ENCOUNTER FOR IMMUNIZATION: ICD-10-CM

## 2024-03-20 PROBLEM — R39.15 URINARY URGENCY: Status: RESOLVED | Noted: 2020-09-11 | Resolved: 2024-03-20

## 2024-03-20 PROBLEM — R35.1 NOCTURIA: Status: RESOLVED | Noted: 2020-09-11 | Resolved: 2024-03-20

## 2024-03-20 PROBLEM — H81.10 BENIGN PAROXYSMAL POSITIONAL VERTIGO: Status: RESOLVED | Noted: 2023-05-05 | Resolved: 2024-03-20

## 2024-03-20 PROBLEM — R97.20 ELEVATED PSA: Status: RESOLVED | Noted: 2020-09-11 | Resolved: 2024-03-20

## 2024-03-20 PROBLEM — E78.5 HYPERLIPIDEMIA: Status: RESOLVED | Noted: 2023-05-05 | Resolved: 2024-03-20

## 2024-03-20 PROBLEM — I38 HEART VALVE DISEASE: Status: RESOLVED | Noted: 2021-03-03 | Resolved: 2024-03-20

## 2024-03-20 PROCEDURE — 90677 PCV20 VACCINE IM: CPT | Performed by: FAMILY MEDICINE

## 2024-03-20 PROCEDURE — 90471 IMMUNIZATION ADMIN: CPT | Performed by: FAMILY MEDICINE

## 2024-03-20 PROCEDURE — 99495 TRANSJ CARE MGMT MOD F2F 14D: CPT | Performed by: FAMILY MEDICINE

## 2024-03-20 NOTE — PROGRESS NOTES
Assessment & Plan     1. Adenocarcinoma of head of pancreas (HCC)  Assessment & Plan:  2nd opinion with Dr Champion      2. Secondary pancreatic insufficiency    3. Bicipital tendinitis of right shoulder    4. Encounter for immunization  -     Pneumococcal Conjugate Vaccine 20-valent (Pcv20)        Depression Screening and Follow-up Plan: Patient was screened for depression during today's encounter. They screened negative with a PHQ-2 score of 0.      Subjective     Transitional Care Management Review:   Brenton Saez is a 63 y.o. male here for TCM follow up.     During the TCM phone call patient stated:  TCM Call       Date and time call was made  3/15/2024  8:55 AM    Hospital care reviewed  Records reviewed    Patient was hospitialized at  [facility]    Date of Admission  03/10/24    Date of discharge  03/14/24    Disposition  Home    Current Symptoms  Middle abdominal pain    Middle abdominal pain onset  Sudden          TCM Call       Post hospital issues  Reduced activity    Scheduled for follow up?  Yes    Referrals needed  [facility]    Did you obtain your prescribed medications  Yes    Do you need help managing your prescriptions or medications  No    Is transportation to your appointment needed  No    I have advised the patient to call PCP with any new or worsening symptoms  shantelle carrillo ma    Living Arrangements  Family members; Spouse or Significiant other    Support System  Family    Do you have social support  Yes, as much as I need    Are you recieving any outpatient services  No    Are you recieving home care services  No    Are you using any community resources  No    Current waiver services  No    Have you fallen in the last 12 months  No    Interperter language line needed  No    Counseling  Patient          TCM admitted 3/10 [facility] for jaundice and pancreatic mass on CT  Transferred to hospitals and had GI evaluation with EBUS and ERCP with stent placement and sphincterotomy    Seen in F/U bu   "[facility] for Oncology Surgical evaluation  Started Creon for diarrhea and gas with some benefit but is persisting.    Seeing Dr Champion at [facility] tomorrow    Lost 6 lbs with recent evaluation of illness    Pain in R shoulder with weight lifting workouts      Review of Systems   Constitutional:  Positive for unexpected weight change.   HENT: Negative.     Respiratory: Negative.     Cardiovascular: Negative.    Gastrointestinal:  Positive for abdominal distention.   Musculoskeletal: Negative.    Psychiatric/Behavioral: Negative.     All other systems reviewed and are negative.      Objective     /73 (BP Location: Left arm, Patient Position: Sitting, Cuff Size: Standard)   Pulse 69   Temp 98 °F (36.7 °C) (Tympanic)   Ht 5' 9.49\" (1.765 m)   Wt 91.8 kg (202 lb 6.4 oz)   SpO2 99%   BMI 29.47 kg/m²      Physical Exam  Vitals and nursing note reviewed.   Constitutional:       Appearance: Normal appearance.   Musculoskeletal:      Right lower leg: No edema.      Left lower leg: No edema.   Neurological:      Mental Status: He is oriented to person, place, and time.       Medications have been reviewed by provider in current encounter    Aries Waters MD         "

## 2024-03-21 LAB
CANCER AG19-9 SERPL-ACNC: 734 U/ML (ref 0–35)
FERRITIN SERPL-MCNC: 73 NG/ML (ref 30–400)
IRON SATN MFR SERPL: 12 % (ref 15–55)
IRON SERPL-MCNC: 45 UG/DL (ref 38–169)
TIBC SERPL-MCNC: 369 UG/DL (ref 250–450)
UIBC SERPL-MCNC: 324 UG/DL (ref 111–343)

## 2024-03-25 ENCOUNTER — TELEPHONE (OUTPATIENT)
Dept: SURGICAL ONCOLOGY | Facility: CLINIC | Age: 64
End: 2024-03-25

## 2024-03-25 DIAGNOSIS — M75.21 BICIPITAL TENDINITIS OF RIGHT SHOULDER: Primary | ICD-10-CM

## 2024-03-25 NOTE — TELEPHONE ENCOUNTER
Spoke to patient and reviewed the elevated Ca 19-9 and Dr Jason's recommendation to start neoadjuvant chemotherapy.   Patient informed me he will be transferring his care to [facility]. He already met with a medical oncologist and surgical oncologist.

## 2024-04-04 ENCOUNTER — TELEPHONE (OUTPATIENT)
Dept: HEMATOLOGY ONCOLOGY | Facility: CLINIC | Age: 64
End: 2024-04-04

## 2024-04-04 NOTE — TELEPHONE ENCOUNTER
Patient Call    Who are you speaking with? Pharmacy    If it is not the patient, are they listed on an active communication consent form? N/A   What is the reason for this call? Pharmacy is calling in asking for clarification on directions for Creon 12,000 units   Does this require a call back? Yes   If a call back is required, please list best call back number [phone]  Reference Number: [id number]   If a call back is required, advise that a message will be forwarded to their care team and someone will return their call as soon as possible.   Did you relay this information to the patient? Yes

## 2024-06-10 ENCOUNTER — HOSPITAL ENCOUNTER (EMERGENCY)
Facility: HOSPITAL | Age: 64
Discharge: HOME | End: 2024-06-10
Attending: EMERGENCY MEDICINE
Payer: COMMERCIAL

## 2024-06-10 VITALS
HEART RATE: 81 BPM | RESPIRATION RATE: 14 BRPM | SYSTOLIC BLOOD PRESSURE: 125 MMHG | BODY MASS INDEX: 29.41 KG/M2 | WEIGHT: 202 LBS | TEMPERATURE: 98.1 F | OXYGEN SATURATION: 96 % | DIASTOLIC BLOOD PRESSURE: 79 MMHG

## 2024-06-10 DIAGNOSIS — R42 VERTIGO: Primary | ICD-10-CM

## 2024-06-10 LAB
2HR DELTA HS TROPONIN: -1 NG/L
ANION GAP SERPL CALCULATED.3IONS-SCNC: 6 MMOL/L (ref 4–13)
ATRIAL RATE: 87 BPM
BASOPHILS # BLD AUTO: 0.1 THOUSANDS/ÂΜL (ref 0–0.1)
BASOPHILS NFR BLD AUTO: 1 % (ref 0–1)
BUN SERPL-MCNC: 14 MG/DL (ref 5–25)
CALCIUM SERPL-MCNC: 9.2 MG/DL (ref 8.4–10.2)
CARDIAC TROPONIN I PNL SERPL HS: 6 NG/L
CARDIAC TROPONIN I PNL SERPL HS: 7 NG/L
CHLORIDE SERPL-SCNC: 104 MMOL/L (ref 96–108)
CO2 SERPL-SCNC: 29 MMOL/L (ref 21–32)
CREAT SERPL-MCNC: 0.87 MG/DL (ref 0.6–1.3)
EOSINOPHIL # BLD AUTO: 0.02 THOUSAND/ÂΜL (ref 0–0.61)
EOSINOPHIL NFR BLD AUTO: 0 % (ref 0–6)
ERYTHROCYTE [DISTWIDTH] IN BLOOD BY AUTOMATED COUNT: 15.7 % (ref 11.6–15.1)
GFR SERPL CREATININE-BSD FRML MDRD: 91 ML/MIN/1.73SQ M
GLUCOSE SERPL-MCNC: 106 MG/DL (ref 65–140)
HCT VFR BLD AUTO: 36.3 % (ref 36.5–49.3)
HGB BLD-MCNC: 11.4 G/DL (ref 12–17)
IMM GRANULOCYTES # BLD AUTO: 0.17 THOUSAND/UL (ref 0–0.2)
IMM GRANULOCYTES NFR BLD AUTO: 1 % (ref 0–2)
LYMPHOCYTES # BLD AUTO: 1.3 THOUSANDS/ÂΜL (ref 0.6–4.47)
LYMPHOCYTES NFR BLD AUTO: 8 % (ref 14–44)
MCH RBC QN AUTO: 29.6 PG (ref 26.8–34.3)
MCHC RBC AUTO-ENTMCNC: 31.4 G/DL (ref 31.4–37.4)
MCV RBC AUTO: 94 FL (ref 82–98)
MONOCYTES # BLD AUTO: 1.17 THOUSAND/ÂΜL (ref 0.17–1.22)
MONOCYTES NFR BLD AUTO: 8 % (ref 4–12)
NEUTROPHILS # BLD AUTO: 12.71 THOUSANDS/ÂΜL (ref 1.85–7.62)
NEUTS SEG NFR BLD AUTO: 82 % (ref 43–75)
NRBC BLD AUTO-RTO: 0 /100 WBCS
P AXIS: 71 DEGREES
PLATELET # BLD AUTO: 169 THOUSANDS/UL (ref 149–390)
PMV BLD AUTO: 11.7 FL (ref 8.9–12.7)
POTASSIUM SERPL-SCNC: 4.3 MMOL/L (ref 3.5–5.3)
PR INTERVAL: 150 MS
QRS AXIS: -27 DEGREES
QRSD INTERVAL: 92 MS
QT INTERVAL: 362 MS
QTC INTERVAL: 435 MS
RBC # BLD AUTO: 3.85 MILLION/UL (ref 3.88–5.62)
SODIUM SERPL-SCNC: 139 MMOL/L (ref 135–147)
T WAVE AXIS: 41 DEGREES
VENTRICULAR RATE: 87 BPM
WBC # BLD AUTO: 15.47 THOUSAND/UL (ref 4.31–10.16)

## 2024-06-10 PROCEDURE — 99284 EMERGENCY DEPT VISIT MOD MDM: CPT

## 2024-06-10 PROCEDURE — 84484 ASSAY OF TROPONIN QUANT: CPT | Performed by: EMERGENCY MEDICINE

## 2024-06-10 PROCEDURE — 96374 THER/PROPH/DIAG INJ IV PUSH: CPT

## 2024-06-10 PROCEDURE — 93010 ELECTROCARDIOGRAM REPORT: CPT | Performed by: INTERNAL MEDICINE

## 2024-06-10 PROCEDURE — 93005 ELECTROCARDIOGRAM TRACING: CPT

## 2024-06-10 PROCEDURE — 99284 EMERGENCY DEPT VISIT MOD MDM: CPT | Performed by: EMERGENCY MEDICINE

## 2024-06-10 PROCEDURE — 80048 BASIC METABOLIC PNL TOTAL CA: CPT | Performed by: EMERGENCY MEDICINE

## 2024-06-10 PROCEDURE — 85025 COMPLETE CBC W/AUTO DIFF WBC: CPT | Performed by: EMERGENCY MEDICINE

## 2024-06-10 PROCEDURE — 96361 HYDRATE IV INFUSION ADD-ON: CPT

## 2024-06-10 PROCEDURE — 36415 COLL VENOUS BLD VENIPUNCTURE: CPT | Performed by: EMERGENCY MEDICINE

## 2024-06-10 RX ORDER — MECLIZINE HYDROCHLORIDE 25 MG/1
25 TABLET ORAL 3 TIMES DAILY PRN
Qty: 30 TABLET | Refills: 0 | Status: SHIPPED | OUTPATIENT
Start: 2024-06-10

## 2024-06-10 RX ORDER — MECLIZINE HCL 12.5 MG/1
25 TABLET ORAL ONCE
Status: COMPLETED | OUTPATIENT
Start: 2024-06-10 | End: 2024-06-10

## 2024-06-10 RX ORDER — ONDANSETRON 2 MG/ML
4 INJECTION INTRAMUSCULAR; INTRAVENOUS ONCE
Status: COMPLETED | OUTPATIENT
Start: 2024-06-10 | End: 2024-06-10

## 2024-06-10 RX ADMIN — ONDANSETRON 4 MG: 2 INJECTION INTRAMUSCULAR; INTRAVENOUS at 19:40

## 2024-06-10 RX ADMIN — MECLIZINE HYDROCHLORIDE 25 MG: 12.5 TABLET ORAL at 19:40

## 2024-06-10 RX ADMIN — SODIUM CHLORIDE 1000 ML: 0.9 INJECTION, SOLUTION INTRAVENOUS at 19:40

## 2024-06-11 NOTE — ED PROVIDER NOTES
History  Chief Complaint   Patient presents with    Dizziness     Pt to ED c/o vertigo, states SWETHA told him to come for eval, currently receiving chemo, due for next treatment Wednesday. Pt states he has history of vertigo but hasn't had as issue with it for many years. +vomiting. States he feels he is spinning     64-year-old male presents for evaluation of dizziness that started earlier tonight.  Patient states symptoms are significantly improved since initial onset.  History of BPPV a few years ago.  Patient attempted Epley maneuver prior to arrival.        Prior to Admission Medications   Prescriptions Last Dose Informant Patient Reported? Taking?   Multiple Vitamins-Minerals (Multi For Him 50+) TABS  Self Yes No   Sig: Take by mouth   Simethicone 250 MG CAPS   Yes No   pancrelipase, Lip-Prot-Amyl, (CREON) 12,000 units capsule   No No   Sig: Take 2 tablets with meals and 1 with snacks   rosuvastatin (Crestor) 5 mg tablet  Self Yes No   tadalafil (CIALIS) 10 MG tablet  Self No No   Sig: Take 1/2 or 1 tablet every 36 hours as needed for sexual activity      Facility-Administered Medications: None       Past Medical History:   Diagnosis Date    Cancer (HCC)     prostate    Colon polyp     HL (hearing loss)     Murmur, heart     Obesity     Patient denies medical problems        Past Surgical History:   Procedure Laterality Date    APPENDECTOMY      COLONOSCOPY      CYST REMOVAL Right 2018    right index finger (knuckle)    HAND SURGERY      PROSTATECTOMY      TONSILLECTOMY         Family History   Problem Relation Age of Onset    Diabetes Mother     Arthritis Mother     Stroke Father     Diabetes Sister     Pancreatic cancer Paternal Aunt      I have reviewed and agree with the history as documented.    E-Cigarette/Vaping    E-Cigarette Use Never User      E-Cigarette/Vaping Substances     Social History     Tobacco Use    Smoking status: Never     Passive exposure: Never    Smokeless tobacco: Never   Vaping Use     Vaping status: Never Used   Substance Use Topics    Alcohol use: Not Currently     Comment: 1 beer per month    Drug use: Never       Review of Systems   Neurological:  Positive for dizziness.       Physical Exam  Physical Exam  Vitals and nursing note reviewed.   Constitutional:       General: He is not in acute distress.     Appearance: He is well-developed.   HENT:      Head: Normocephalic and atraumatic.      Right Ear: External ear normal.      Left Ear: External ear normal.      Nose: Nose normal.   Eyes:      General: No scleral icterus.  Pulmonary:      Effort: Pulmonary effort is normal. No respiratory distress.   Abdominal:      General: There is no distension.      Palpations: Abdomen is soft.   Musculoskeletal:         General: No deformity. Normal range of motion.      Cervical back: Normal range of motion and neck supple.   Skin:     General: Skin is warm.      Findings: No rash.   Neurological:      General: No focal deficit present.      Mental Status: He is alert.      Gait: Gait normal.      Comments: HINTS exam reassuring, finger to nose intact, able to ambulate   Psychiatric:         Mood and Affect: Mood normal.         Vital Signs  ED Triage Vitals [06/10/24 1817]   Temperature Pulse Respirations Blood Pressure SpO2   98.1 °F (36.7 °C) 83 18 139/94 99 %      Temp Source Heart Rate Source Patient Position - Orthostatic VS BP Location FiO2 (%)   Temporal Monitor Sitting Left arm --      Pain Score       No Pain           Vitals:    06/10/24 1925 06/10/24 2030 06/10/24 2100 06/10/24 2200   BP: 133/75 132/80 137/81 125/79   Pulse: 85 85 84 81   Patient Position - Orthostatic VS: Sitting Sitting Sitting Sitting         Visual Acuity  Visual Acuity      Flowsheet Row Most Recent Value   L Pupil Size (mm) 3   R Pupil Size (mm) 3            ED Medications  Medications   meclizine (ANTIVERT) tablet 25 mg (25 mg Oral Given 6/10/24 1940)   ondansetron (ZOFRAN) injection 4 mg (4 mg Intravenous Given  6/10/24 1940)   sodium chloride 0.9 % bolus 1,000 mL (0 mL Intravenous Stopped 6/10/24 2213)       Diagnostic Studies  Results Reviewed       Procedure Component Value Units Date/Time    HS Troponin I 2hr [[id number]]  (Normal) Collected: 06/10/24 2152    Lab Status: Final result Specimen: Blood from Arm, Left Updated: 06/10/24 2229     hs TnI 2hr 6 ng/L      Delta 2hr hsTnI -1 ng/L     HS Troponin I 4hr [[id number]]     Lab Status: No result Specimen: Blood     HS Troponin 0hr (reflex protocol) [[id number]]  (Normal) Collected: 06/10/24 1940    Lab Status: Final result Specimen: Blood from Arm, Left Updated: 06/10/24 2035     hs TnI 0hr 7 ng/L     Basic metabolic panel [[id number]] Collected: 06/10/24 1940    Lab Status: Final result Specimen: Blood from Arm, Left Updated: 06/10/24 2029     Sodium 139 mmol/L      Potassium 4.3 mmol/L      Chloride 104 mmol/L      CO2 29 mmol/L      ANION GAP 6 mmol/L      BUN 14 mg/dL      Creatinine 0.87 mg/dL      Glucose 106 mg/dL      Calcium 9.2 mg/dL      eGFR 91 ml/min/1.73sq m     Narrative:      National Kidney Disease Foundation guidelines for Chronic Kidney Disease (CKD):     Stage 1 with normal or high GFR (GFR > 90 mL/min/1.73 square meters)    Stage 2 Mild CKD (GFR = 60-89 mL/min/1.73 square meters)    Stage 3A Moderate CKD (GFR = 45-59 mL/min/1.73 square meters)    Stage 3B Moderate CKD (GFR = 30-44 mL/min/1.73 square meters)    Stage 4 Severe CKD (GFR = 15-29 mL/min/1.73 square meters)    Stage 5 End Stage CKD (GFR <15 mL/min/1.73 square meters)  Note: GFR calculation is accurate only with a steady state creatinine    CBC and differential [[id number]]  (Abnormal) Collected: 06/10/24 1940    Lab Status: Final result Specimen: Blood from Arm, Left Updated: 06/10/24 2012     WBC 15.47 Thousand/uL      RBC 3.85 Million/uL      Hemoglobin 11.4 g/dL      Hematocrit 36.3 %      MCV 94 fL      MCH 29.6 pg      MCHC 31.4 g/dL      RDW 15.7 %      MPV 11.7 fL      Platelets 169  Thousands/uL      nRBC 0 /100 WBCs      Segmented % 82 %      Immature Grans % 1 %      Lymphocytes % 8 %      Monocytes % 8 %      Eosinophils Relative 0 %      Basophils Relative 1 %      Absolute Neutrophils 12.71 Thousands/µL      Absolute Immature Grans 0.17 Thousand/uL      Absolute Lymphocytes 1.30 Thousands/µL      Absolute Monocytes 1.17 Thousand/µL      Eosinophils Absolute 0.02 Thousand/µL      Basophils Absolute 0.10 Thousands/µL                    No orders to display              Procedures  Procedures         ED Course                               SBIRT 22yo+      Flowsheet Row Most Recent Value   Initial Alcohol Screen: US AUDIT-C     1. How often do you have a drink containing alcohol? 0 Filed at: 06/10/2024 1822   2. How many drinks containing alcohol do you have on a typical day you are drinking?  0 Filed at: 06/10/2024 1822   3a. Male UNDER 65: How often do you have five or more drinks on one occasion? 0 Filed at: 06/10/2024 1822   3b. FEMALE Any Age, or MALE 65+: How often do you have 4 or more drinks on one occassion? 0 Filed at: 06/10/2024 1822   Audit-C Score 0 Filed at: 06/10/2024 1822   MINH: How many times in the past year have you...    Used an illegal drug or used a prescription medication for non-medical reasons? Never Filed at: 06/10/2024 1822                      Medical Decision Making  64-year-old male presenting with symptoms consistent with vertigo.  Obtain labs, EKG, symptom control.    Patient reports complete resolution of symptoms.  Able to ambulate to the bathroom without difficulty.  Prescription for meclizine sent to pharmacy.  Follow-up with PCP.    Amount and/or Complexity of Data Reviewed  Labs: ordered.    Risk  Prescription drug management.             Disposition  Final diagnoses:   Vertigo     Time reflects when diagnosis was documented in both MDM as applicable and the Disposition within this note       Time User Action Codes Description Comment    6/10/2024 10:30  PM Jose Alberto Randolph Add [R42] Vertigo           ED Disposition       ED Disposition   Discharge    Condition   Stable    Date/Time   Mon Sudarshan 10, 2024 2230    Comment   Brenton Se discharge to home/self care.                   Follow-up Information       Follow up With Specialties Details Why Contact Info Additional Information    Aries Waters MD Family Medicine   [facility] [facility] [address]  [facility] [address]  [phone]        [facility] Emergency Department Emergency Medicine  If symptoms worsen [address] [facility] [address] [phone] [facility] Emergency Department, [address] [place], [place] [address]            Discharge Medication List as of 6/10/2024 10:31 PM        START taking these medications    Details   meclizine (ANTIVERT) 25 mg tablet Take 1 tablet (25 mg total) by mouth 3 (three) times a day as needed for dizziness, Starting Mon 6/10/2024, Normal           CONTINUE these medications which have NOT CHANGED    Details   Multiple Vitamins-Minerals (Multi For Him 50+) TABS Take by mouth, Historical Med      pancrelipase, Lip-Prot-Amyl, (CREON) 12,000 units capsule Take 2 tablets with meals and 1 with snacks, Normal      rosuvastatin (Crestor) 5 mg tablet Starting Thu 6/15/2023, Historical Med      Simethicone 250 MG CAPS Historical Med      tadalafil (CIALIS) 10 MG tablet Take 1/2 or 1 tablet every 36 hours as needed for sexual activity, Print             No discharge procedures on file.    PDMP Review       None            ED Provider  Electronically Signed by             Jose Alberto Randolph DO  06/10/24 0047

## 2024-07-08 DIAGNOSIS — N52.9 ERECTILE DYSFUNCTION, UNSPECIFIED ERECTILE DYSFUNCTION TYPE: ICD-10-CM

## 2024-07-09 RX ORDER — TADALAFIL 10 MG/1
TABLET ORAL
Qty: 10 TABLET | Refills: 1 | Status: SHIPPED | OUTPATIENT
Start: 2024-07-09

## 2024-07-25 ENCOUNTER — OFFICE VISIT (OUTPATIENT)
Dept: FAMILY MEDICINE CLINIC | Facility: HOSPITAL | Age: 64
End: 2024-07-25
Payer: COMMERCIAL

## 2024-07-25 ENCOUNTER — TELEPHONE (OUTPATIENT)
Dept: FAMILY MEDICINE CLINIC | Facility: HOSPITAL | Age: 64
End: 2024-07-25

## 2024-07-25 ENCOUNTER — HOSPITAL ENCOUNTER (OUTPATIENT)
Dept: RADIOLOGY | Facility: HOSPITAL | Age: 64
End: 2024-07-25
Payer: COMMERCIAL

## 2024-07-25 VITALS
DIASTOLIC BLOOD PRESSURE: 78 MMHG | OXYGEN SATURATION: 97 % | TEMPERATURE: 100.1 F | BODY MASS INDEX: 31.7 KG/M2 | SYSTOLIC BLOOD PRESSURE: 138 MMHG | RESPIRATION RATE: 16 BRPM | WEIGHT: 214 LBS | HEIGHT: 69 IN | HEART RATE: 92 BPM

## 2024-07-25 DIAGNOSIS — J01.00 ACUTE MAXILLARY SINUSITIS, RECURRENCE NOT SPECIFIED: ICD-10-CM

## 2024-07-25 DIAGNOSIS — R21 RASH: ICD-10-CM

## 2024-07-25 DIAGNOSIS — J06.9 VIRAL UPPER RESPIRATORY INFECTION: Primary | ICD-10-CM

## 2024-07-25 DIAGNOSIS — R06.2 WHEEZING: ICD-10-CM

## 2024-07-25 PROCEDURE — 87635 SARS-COV-2 COVID-19 AMP PRB: CPT | Performed by: INTERNAL MEDICINE

## 2024-07-25 PROCEDURE — 99214 OFFICE O/P EST MOD 30 MIN: CPT | Performed by: INTERNAL MEDICINE

## 2024-07-25 PROCEDURE — 71046 X-RAY EXAM CHEST 2 VIEWS: CPT

## 2024-07-25 RX ORDER — FLUTICASONE PROPIONATE 50 MCG
2 SPRAY, SUSPENSION (ML) NASAL DAILY
Qty: 16 G | Refills: 0 | Status: SHIPPED | OUTPATIENT
Start: 2024-07-25 | End: 2024-08-08

## 2024-07-25 RX ORDER — DOXYCYCLINE 100 MG/1
100 CAPSULE ORAL 2 TIMES DAILY
Qty: 14 CAPSULE | Refills: 0 | Status: SHIPPED | OUTPATIENT
Start: 2024-07-25 | End: 2024-08-01

## 2024-07-25 RX ORDER — PEGFILGRASTIM-APGF 6 MG/.6ML
6 INJECTION, SOLUTION SUBCUTANEOUS
COMMUNITY
Start: 2024-05-16

## 2024-07-25 RX ORDER — DEXAMETHASONE 4 MG/1
TABLET ORAL
COMMUNITY
Start: 2024-04-03

## 2024-07-25 RX ORDER — SODIUM CHLORIDE 0.9 % (FLUSH) 0.9 %
10 SYRINGE (ML) INJECTION
COMMUNITY
Start: 2024-04-03 | End: 2025-03-25

## 2024-07-25 RX ORDER — ONDANSETRON 4 MG/1
4 TABLET, FILM COATED ORAL EVERY 6 HOURS PRN
COMMUNITY

## 2024-07-25 RX ORDER — LOPERAMIDE HYDROCHLORIDE 2 MG/1
2 TABLET ORAL
COMMUNITY
Start: 2024-04-03

## 2024-07-25 RX ORDER — BETAMETHASONE DIPROPIONATE 0.5 MG/G
CREAM TOPICAL 2 TIMES DAILY
Qty: 15 G | Refills: 0 | Status: SHIPPED | OUTPATIENT
Start: 2024-07-25

## 2024-07-25 NOTE — PROGRESS NOTES
Assessment/Plan:    No problem-specific Assessment & Plan notes found for this encounter.       Diagnoses and all orders for this visit:    Viral upper respiratory infection  -     COVID Only- Office Collect    Acute maxillary sinusitis, recurrence not specified  -     doxycycline monohydrate (MONODOX) 100 mg capsule; Take 1 capsule (100 mg total) by mouth 2 (two) times a day for 7 days  -     fluticasone (FLONASE) 50 mcg/act nasal spray; 2 sprays into each nostril daily for 14 days    Wheezing  -     XR chest pa & lateral; Future    Rash  -     betamethasone dipropionate (DIPROSONE) 0.05 % cream; Apply topically 2 (two) times a day    Other orders  -     Nyvepria 6 MG/0.6ML; Inject 6 mg under the skin every 14 (fourteen) days  -     dexamethasone (DECADRON) 4 mg tablet; Take 2 tablets on day 2 and 3 of chemotherapy  -     loperamide (IMODIUM A-D) 2 MG tablet; Take 2 mg by mouth  -     ondansetron (ZOFRAN) 4 mg tablet; Take 4 mg by mouth every 6 (six) hours as needed for nausea  -     sodium chloride 0.9 % SOLN; Inject 10 mL into a catheter in a vein        I did not hear wheezing or crackles on examination but I ordered a chest x-ray to look for pneumonia.  Patient could have a bacterial infection, I gave him doxycycline.  I also want to test him for COVID.    I advised him to use for nasal spray in addition to Mucinex that he has been using    The rash looks like eczema, will try betamethasone cream.  If not better will refer to dermatology          Subjective:      Patient ID: Brenton Saez is a 64 y.o. male.      Patient developed sinus congestion, chest congestion, cough with purulent sputum and wheezing in the lungs when laying in bed about 3 days ago.  He also had low-grade fevers.  Denies chest pain, hemoptysis, abdominal pain, diarrhea.    He has pancreatic cancer and will have Whipple's procedure on August 6.    He also has a rash on his left wrist for about 2 months which is mildly  "pruritic        Patient presents for follow-up of chronic conditions as detailed in the assessment and plan.      The following portions of the patient's history were reviewed and updated as appropriate: current medications, past family history, past medical history, past social history, past surgical history and problem list.    Review of Systems      Objective:    /78   Pulse 92   Temp 100.1 °F (37.8 °C) (Tympanic)   Resp 16   Ht 5' 9.49\" (1.765 m)   Wt 97.1 kg (214 lb)   SpO2 97%   BMI 31.16 kg/m²      Physical Exam  Constitutional:       General: He is not in acute distress.     Appearance: He is not toxic-appearing.   HENT:      Head: Normocephalic.      Nose: Congestion and rhinorrhea present.      Mouth/Throat:      Mouth: Mucous membranes are moist.      Pharynx: No oropharyngeal exudate or posterior oropharyngeal erythema.   Eyes:      Conjunctiva/sclera: Conjunctivae normal.   Cardiovascular:      Rate and Rhythm: Normal rate and regular rhythm.      Heart sounds: No murmur heard.  Pulmonary:      Effort: No respiratory distress.      Breath sounds: No wheezing, rhonchi or rales.   Musculoskeletal:      Cervical back: Neck supple.   Lymphadenopathy:      Cervical: No cervical adenopathy.   Skin:     Findings: Rash present.   Neurological:      Mental Status: He is alert.             Georgia Moore MD  "

## 2024-07-26 ENCOUNTER — TELEPHONE (OUTPATIENT)
Dept: FAMILY MEDICINE CLINIC | Facility: HOSPITAL | Age: 64
End: 2024-07-26

## 2024-07-26 LAB — SARS-COV-2 RNA RESP QL NAA+PROBE: NEGATIVE

## 2024-07-26 NOTE — TELEPHONE ENCOUNTER
Pt calling back asking about Mild left basilar atelectasis. He wasn't sure if he should be concerned

## 2024-08-28 ENCOUNTER — TELEPHONE (OUTPATIENT)
Age: 64
End: 2024-08-28

## 2024-08-28 NOTE — TELEPHONE ENCOUNTER
Patient called because he wanted to ask doctor Waters to follow up with him in regards to what recommendation he has for him to see a different primary care provider since he is retiring. He was trying to schedule an appointment with doctor Waters before end of September if possible. Can someone please follow up and advise patient     thank you

## 2024-09-17 ENCOUNTER — APPOINTMENT (RX ONLY)
Dept: URBAN - METROPOLITAN AREA CLINIC 31 | Facility: CLINIC | Age: 64
Setting detail: DERMATOLOGY
End: 2024-09-17

## 2024-09-17 DIAGNOSIS — L20.89 OTHER ATOPIC DERMATITIS: ICD-10-CM

## 2024-09-17 PROBLEM — L30.9 DERMATITIS, UNSPECIFIED: Status: ACTIVE | Noted: 2024-09-17

## 2024-09-17 PROCEDURE — ? COUNSELING

## 2024-09-17 PROCEDURE — ? PRESCRIPTION MEDICATION MANAGEMENT

## 2024-09-17 PROCEDURE — 99213 OFFICE O/P EST LOW 20 MIN: CPT

## 2024-09-17 PROCEDURE — ? PRESCRIPTION

## 2024-09-17 RX ORDER — CLOBETASOL PROPIONATE 0.5 MG/G
CREAM TOPICAL
Qty: 45 | Refills: 0 | Status: ERX | COMMUNITY
Start: 2024-09-17

## 2024-09-17 RX ADMIN — CLOBETASOL PROPIONATE: 0.5 CREAM TOPICAL at 00:00

## 2024-09-17 ASSESSMENT — LOCATION ZONE DERM: LOCATION ZONE: ARM

## 2024-09-17 ASSESSMENT — LOCATION SIMPLE DESCRIPTION DERM: LOCATION SIMPLE: LEFT FOREARM

## 2024-09-17 ASSESSMENT — LOCATION DETAILED DESCRIPTION DERM: LOCATION DETAILED: LEFT DISTAL DORSAL FOREARM

## 2024-09-17 NOTE — HPI: RASH
What Type Of Note Output Would You Prefer (Optional)?: Bullet Format
Is The Patient Presenting As Previously Scheduled?: No, they are a work-in
Is This A New Presentation, Or A Follow-Up?: Rash
Additional History: Patient states the rash appeared 2 months after beginning chemotherapy. Patient states he treated the areas with OTC hydrocortisone and his PCP called in Rx strength hydrocortisone and notes no improvement.

## 2024-09-17 NOTE — PROCEDURE: PRESCRIPTION MEDICATION MANAGEMENT
Initiate Treatment: clobetasol 0.05 % topical cream: Apply to left wrist BID x 2 weeks then discontinue. Do not use more than 2 weeks per area per month
Render In Strict Bullet Format?: No
Detail Level: Zone
Plan: Reviewed GSC. Next step biopsy if no improvement with treatment. Pt instructed to contact office in 2 weeks if not better

## 2024-10-24 ENCOUNTER — TELEPHONE (OUTPATIENT)
Age: 64
End: 2024-10-24

## 2024-10-24 NOTE — TELEPHONE ENCOUNTER
Patient called in. He has new OV with Dr. Steen 10/30/24. He has to go to [facility] 10/28/24 for PSA test for another doctor and is wondering if she would order him needed lab work for annual physical. He has a difficult time with his veins after chemo and would love to get everything done with one venepuncture. He had whipple procedure done at [facility] and Endocrinologist suggested he have an A1C done if she is willing to order. Please advise patient. Thank you

## 2024-10-24 NOTE — TELEPHONE ENCOUNTER
Cannot order labs on a patient that I have not seen . Once an established patient, like to have labs done prior to appt but cannot do that for new patient.

## 2024-10-29 PROBLEM — D50.9 IRON DEFICIENCY ANEMIA: Status: ACTIVE | Noted: 2024-09-25

## 2024-10-29 NOTE — PROGRESS NOTES
Adult Annual Physical  Name: Brenton Saez      : 1960      MRN: [id number]  Encounter Provider: Mary Steen DO  Encounter Date: 10/30/2024   Encounter department: [facility] PRIMARY CARE    Assessment & Plan  Annual physical exam  Health Maintenance   Topic Date Due    HIV Screening  Never done    Zoster Vaccine (1 of 2) Never done    Influenza Vaccine (1) 2024    Depression Screening  2025    Annual Physical  10/30/2025    Colorectal Cancer Screening  2029    DTaP,Tdap,and Td Vaccines (4 - Td or Tdap) 2033    Hepatitis C Screening  Completed    RSV Vaccine Age 60+ Years  Completed    COVID-19 Vaccine  Completed    RSV Vaccine age 0-20 Months  Aged Out    Pneumococcal Vaccine: Pediatrics (0 to 5 Years) and At-Risk Patients (6 to 64 Years)  Aged Out    HIB Vaccine  Aged Out    IPV Vaccine  Aged Out    Hepatitis A Vaccine  Aged Out    Meningococcal ACWY Vaccine  Aged Out    HPV Vaccine  Aged Out            Adenocarcinoma of head of pancreas (HCC)  S/p whipple procedure  Follows with heme onc at [place]   on 10/23/24 was normal at 23  On second round of chemotherapy         IFG (impaired fasting glucose)  Labs done on 10/23/24 showed glucose of 135.   Monitor sugars daily with one touch verio machine  Had seen endocrinology and was given rx for short acting insulin should sugars be elevated. Has only used once.   POCT A1c done today was 5.8    Orders:    POCT hemoglobin A1c    Malignant neoplasm of prostate (HCC)  S/p laparascopic radical prostatectomy    Follows with urology at [place]  PSA on 10/11/23 was < 0.011  Reportedly had repeat PSA done last week done at [facility]. Follows with Dr Pena         Iron deficiency anemia, unspecified iron deficiency anemia type  Reviewed labs done 10/23/24. H/H were low at 8.9/27 respectively  His iron level was low at 34 and ferrtin low at 8.3  Had iron infusion with his last chemo infusion         Superior  "mesenteric vein thrombosis (HCC)  Occurred post operatively  On lovenox bid for the last 3 months.   CTA 10/17 showed 1.  Patent portal vein reconstruction with stable segmental thrombosis of the superior mesenteric vein.     He has video call scheduled this afternoon with his CT surgeon regarding possibility of d/c this.          Mixed hyperlipidemia  On crestor.   Lab Results   Component Value Date    CHOLESTEROL 130 12/22/2023    CHOLESTEROL 146 09/06/2023     Lab Results   Component Value Date    HDL 54 12/22/2023    HDL 60 09/06/2023     Lab Results   Component Value Date    TRIG 110 08/19/2024    TRIG 161 (H) 08/12/2024    TRIG 135 08/09/2024     No results found for: \"NONHDLC\"             Immunizations and preventive care screenings were discussed with patient today. Appropriate education was printed on patient's after visit summary.        Counseling:  Alcohol/drug use: discussed moderation in alcohol intake, the recommendations for healthy alcohol use, and avoidance of illicit drug use.  Dental Health: discussed importance of regular tooth brushing, flossing, and dental visits.  Injury prevention: discussed safety/seat belts, safety helmets, smoke detectors, carbon monoxide detectors, and smoking near bedding or upholstery.  Sexual health: discussed sexually transmitted diseases, partner selection, use of condoms, avoidance of unintended pregnancy, and contraceptive alternatives.  Exercise: the importance of regular exercise/physical activity was discussed. Recommend exercise 3-5 times per week for at least 30 minutes.          History of Present Illness     Adult Annual Physical:  Patient presents for annual physical.     Diet and Physical Activity:  - Diet/Nutrition: well balanced diet.  - Exercise: walking.    General Health:  - Sleep: sleeps well.  - Hearing: decreased hearing left ear and requires use of hearing aids.  - Vision: no vision problems and wears glasses.  - Dental: regular dental " visits.    Advanced Care Planning:  - Has an advanced directive?: yes    - Has a durable medical POA?: yes      Patient is a  64 year old male with pancreatic cancer s/p whipple procedure, prostate cancer s/p prostatectomy, impaired fasting glucose and iron deficiency anemia who is being seen today as a new patient to this office for annual physical  Previously saw Dr Waters at [facility].     Colon cancer screen up to date 12/8/22  Adacel up to date  Shingrix reportedly up to date.   Pneumococcal vaccine up to date  Has not received shingrix  Had flu and covid vaccines on 10/7     Has rash on left forearm that started during chemotherapy. Saw dermatology and the said it was either contact dermatitis or eczema. Was not biopsied. Gave steroid cream which is not helping. Now scheduled to see Dr. Galarza.     Patient Active Problem List   Diagnosis    Malignant neoplasm of prostate (HCC)--follows with urology at [place] for this. S/p prostatectomy. 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   He has rx for rapid acting insulin that his oncologist had given him. Uses when sugar gets high.     Wears hearing aid in left ear    LFT elevation    Slow transit constipation    IFG (impaired fasting glucose)    Adenocarcinoma of head of pancreas (HCC)--follows with heme onc at [place] for this. S/p whipple procedure  Had CA 19-9 on 10/23 which was normal at 23    Secondary pancreatic insufficiency-- on creon for this.     Bicipital tendinitis of right shoulder    Iron deficiency anemia--H/H on 10/23 8.9/27 with iron of 34 and ferritin of 8.3        Review of Systems  Medical History Reviewed by provider this encounter:  Tobacco  Allergies  Meds  Problems  Med Hx  Surg Hx  Fam Hx  Soc   Hx      Current Outpatient Medications on File Prior to Visit   Medication Sig Dispense  Refill    clobetasol (TEMOVATE) 0.05 % cream Apply topically 2 (two) times a day      dexamethasone (DECADRON) 4 mg tablet Take 2 tablets on day 2 and 3 of chemotherapy      enoxaparin (LOVENOX) 100 mg/mL Inject 100 mg under the skin every 12 (twelve) hours      insulin aspart (NovoLOG FlexPen) 100 UNIT/ML injection pen Inject 0-6 Units under the skin Three times a day      loperamide (IMODIUM A-D) 2 MG tablet Take 2 mg by mouth      meclizine (ANTIVERT) 25 mg tablet Take 1 tablet (25 mg total) by mouth 3 (three) times a day as needed for dizziness 30 tablet 0    ondansetron (ZOFRAN) 4 mg tablet Take 4 mg by mouth every 6 (six) hours as needed for nausea      pancrelipase, Lip-Prot-Amyl, (CREON) 12,000 units capsule Take 2 tablets with meals and 1 with snacks 90 capsule 3    pantoprazole (PROTONIX) 40 mg tablet Take 40 mg by mouth 2 (two) times a day      rosuvastatin (Crestor) 5 mg tablet 1 daily      Simethicone 250 MG CAPS As needed      tadalafil (CIALIS) 10 MG tablet Take 1/2 or 1 tablet every 36 hours as needed for sexual activity 10 tablet 1    [DISCONTINUED] betamethasone dipropionate (DIPROSONE) 0.05 % cream Apply topically 2 (two) times a day (Patient not taking: Reported on 10/30/2024) 15 g 0    [DISCONTINUED] fluticasone (FLONASE) 50 mcg/act nasal spray 2 sprays into each nostril daily for 14 days (Patient not taking: Reported on 10/30/2024) 16 g 0    [DISCONTINUED] Multiple Vitamins-Minerals (Multi For Him 50+) TABS Take by mouth (Patient not taking: Reported on 10/30/2024)      [DISCONTINUED] Nyvepria 6 MG/0.6ML Inject 6 mg under the skin every 14 (fourteen) days (Patient not taking: Reported on 10/30/2024)      [DISCONTINUED] sodium chloride 0.9 % SOLN Inject 10 mL into a catheter in a vein (Patient not taking: Reported on 10/30/2024)       No current facility-administered medications on file prior to visit.        Objective     There were no vitals taken for this visit.    Physical Exam  Vitals and  nursing note reviewed.   Constitutional:       General: He is not in acute distress.     Appearance: Normal appearance. He is not ill-appearing, toxic-appearing or diaphoretic.   HENT:      Head: Normocephalic and atraumatic.      Right Ear: Tympanic membrane normal.      Left Ear: Tympanic membrane normal.      Ears:      Comments: Hearing aid left ear removed     Nose: Nose normal.      Mouth/Throat:      Mouth: Mucous membranes are moist.      Pharynx: No posterior oropharyngeal erythema.   Eyes:      Extraocular Movements: Extraocular movements intact.      Conjunctiva/sclera: Conjunctivae normal.      Pupils: Pupils are equal, round, and reactive to light.   Cardiovascular:      Rate and Rhythm: Normal rate and regular rhythm.      Heart sounds: No murmur heard.  Pulmonary:      Effort: Pulmonary effort is normal. No respiratory distress.      Breath sounds: Normal breath sounds. No stridor. No wheezing.   Abdominal:      General: Abdomen is flat. Bowel sounds are normal.      Palpations: Abdomen is soft.      Tenderness: There is abdominal tenderness (mild generalized tenderness).   Musculoskeletal:      Cervical back: Normal range of motion and neck supple.      Right lower leg: No edema.      Left lower leg: No edema.   Lymphadenopathy:      Cervical: No cervical adenopathy.   Skin:     General: Skin is warm and dry.      Findings: Rash (left forearm with erythematous maculopapular rash) present.   Neurological:      General: No focal deficit present.      Mental Status: He is oriented to person, place, and time.      Deep Tendon Reflexes: Reflexes normal.   Psychiatric:         Mood and Affect: Mood normal.

## 2024-10-29 NOTE — PATIENT INSTRUCTIONS
"Patient Education     Routine physical for adults   The Basics   Written by the doctors and editors at [facility]   What is a physical? -- A physical is a routine visit, or \"check-up,\" with your doctor. You might also hear it called a \"wellness visit\" or \"preventive visit.\"  During each visit, the doctor will:   Ask about your physical and mental health   Ask about your habits, behaviors, and lifestyle   Do an exam   Give you vaccines if needed   Talk to you about any medicines you take   Give advice about your health   Answer your questions  Getting regular check-ups is an important part of taking care of your health. It can help your doctor find and treat any problems you have. But it's also important for preventing health problems.  A routine physical is different from a \"sick visit.\" A sick visit is when you see a doctor because of a health concern or problem. Since physicals are scheduled ahead of time, you can think about what you want to ask the doctor.  How often should I get a physical? -- It depends on your age and health. In general, for people age 21 years and older:   If you are younger than 50 years, you might be able to get a physical every 3 years.   If you are 50 years or older, your doctor might recommend a physical every year.  If you have an ongoing health condition, like diabetes or high blood pressure, your doctor will probably want to see you more often.  What happens during a physical? -- In general, each visit will include:   Physical exam - The doctor or nurse will check your height, weight, heart rate, and blood pressure. They will also look at your eyes and ears. They will ask about how you are feeling and whether you have any symptoms that bother you.   Medicines - It's a good idea to bring a list of all the medicines you take to each doctor visit. Your doctor will talk to you about your medicines and answer any questions. Tell them if you are having any side effects that bother you. You " "should also tell them if you are having trouble paying for any of your medicines.   Habits and behaviors - This includes:   Your diet   Your exercise habits   Whether you smoke, drink alcohol, or use drugs   Whether you are sexually active   Whether you feel safe at home  Your doctor will talk to you about things you can do to improve your health and lower your risk of health problems. They will also offer help and support. For example, if you want to quit smoking, they can give you advice and might prescribe medicines. If you want to improve your diet or get more physical activity, they can help you with this, too.   Lab tests, if needed - The tests you get will depend on your age and situation. For example, your doctor might want to check your:   Cholesterol   Blood sugar   Iron level   Vaccines - The recommended vaccines will depend on your age, health, and what vaccines you already had. Vaccines are very important because they can prevent certain serious or deadly infections.   Discussion of screening - \"Screening\" means checking for diseases or other health problems before they cause symptoms. Your doctor can recommend screening based on your age, risk, and preferences. This might include tests to check for:   Cancer, such as breast, prostate, cervical, ovarian, colorectal, prostate, lung, or skin cancer   Sexually transmitted infections, such as chlamydia and gonorrhea   Mental health conditions like depression and anxiety  Your doctor will talk to you about the different types of screening tests. They can help you decide which screenings to have. They can also explain what the results might mean.   Answering questions - The physical is a good time to ask the doctor or nurse questions about your health. If needed, they can refer you to other doctors or specialists, too.  Adults older than 65 years often need other care, too. As you get older, your doctor will talk to you about:   How to prevent falling at " home   Hearing or vision tests   Memory testing   How to take your medicines safely   Making sure that you have the help and support you need at home  All topics are updated as new evidence becomes available and our peer review process is complete.  This topic retrieved from [employer] on: May 02, 2024.  Topic 300803 Version 1.0  Release: 32.4.3 - C32.122  © 2024 UpToDate, Inc. and/or its affiliates. All rights reserved.  Consumer Information Use and Disclaimer   Disclaimer: This generalized information is a limited summary of diagnosis, treatment, and/or medication information. It is not meant to be comprehensive and should be used as a tool to help the user understand and/or assess potential diagnostic and treatment options. It does NOT include all information about conditions, treatments, medications, side effects, or risks that may apply to a specific patient. It is not intended to be medical advice or a substitute for the medical advice, diagnosis, or treatment of a health care provider based on the health care provider's examination and assessment of a patient's specific and unique circumstances. Patients must speak with a health care provider for complete information about their health, medical questions, and treatment options, including any risks or benefits regarding use of medications. This information does not endorse any treatments or medications as safe, effective, or approved for treating a specific patient. UpToDate, Inc. and its affiliates disclaim any warranty or liability relating to this information or the use thereof.The use of this information is governed by the Terms of Use, available at https://www.woltersMaterialiseuwer.com/en/know/clinical-effectiveness-terms. 2024© UpToDate, Inc. and its affiliates and/or licensors. All rights reserved.  Copyright   © 2024 UpToDate, Inc. and/or its affiliates. All rights reserved.

## 2024-10-30 ENCOUNTER — OFFICE VISIT (OUTPATIENT)
Dept: FAMILY MEDICINE CLINIC | Facility: CLINIC | Age: 64
End: 2024-10-30
Payer: COMMERCIAL

## 2024-10-30 VITALS
HEART RATE: 98 BPM | WEIGHT: 194.2 LBS | BODY MASS INDEX: 28.76 KG/M2 | SYSTOLIC BLOOD PRESSURE: 129 MMHG | HEIGHT: 69 IN | DIASTOLIC BLOOD PRESSURE: 72 MMHG

## 2024-10-30 DIAGNOSIS — R73.01 IFG (IMPAIRED FASTING GLUCOSE): ICD-10-CM

## 2024-10-30 DIAGNOSIS — C61 MALIGNANT NEOPLASM OF PROSTATE (HCC): ICD-10-CM

## 2024-10-30 DIAGNOSIS — E78.2 MIXED HYPERLIPIDEMIA: ICD-10-CM

## 2024-10-30 DIAGNOSIS — D50.9 IRON DEFICIENCY ANEMIA, UNSPECIFIED IRON DEFICIENCY ANEMIA TYPE: ICD-10-CM

## 2024-10-30 DIAGNOSIS — K55.069 SUPERIOR MESENTERIC VEIN THROMBOSIS (HCC): ICD-10-CM

## 2024-10-30 DIAGNOSIS — Z00.00 ANNUAL PHYSICAL EXAM: Primary | ICD-10-CM

## 2024-10-30 DIAGNOSIS — C25.0 ADENOCARCINOMA OF HEAD OF PANCREAS (HCC): ICD-10-CM

## 2024-10-30 LAB — SL AMB POCT HEMOGLOBIN AIC: 5.8 (ref ?–6.5)

## 2024-10-30 PROCEDURE — 99386 PREV VISIT NEW AGE 40-64: CPT | Performed by: FAMILY MEDICINE

## 2024-10-30 PROCEDURE — 83036 HEMOGLOBIN GLYCOSYLATED A1C: CPT | Performed by: FAMILY MEDICINE

## 2024-10-30 RX ORDER — CLOBETASOL PROPIONATE 0.5 MG/G
CREAM TOPICAL 2 TIMES DAILY
COMMUNITY

## 2024-10-30 RX ORDER — INSULIN ASPART 100 [IU]/ML
0-6 INJECTION, SOLUTION INTRAVENOUS; SUBCUTANEOUS 3 TIMES DAILY
COMMUNITY
Start: 2024-08-28 | End: 2024-12-26

## 2024-10-30 RX ORDER — PANTOPRAZOLE SODIUM 40 MG/1
40 TABLET, DELAYED RELEASE ORAL 2 TIMES DAILY
COMMUNITY

## 2024-10-30 RX ORDER — ENOXAPARIN SODIUM 100 MG/ML
100 INJECTION SUBCUTANEOUS EVERY 12 HOURS
COMMUNITY

## 2024-10-30 NOTE — ASSESSMENT & PLAN NOTE
S/p whipple procedure  Follows with heme onc at [place]   on 10/23/24 was normal at 23  On second round of chemotherapy

## 2024-10-30 NOTE — ASSESSMENT & PLAN NOTE
Labs done on 10/23/24 showed glucose of 135.   Monitor sugars daily with one touch verio machine  Had seen endocrinology and was given rx for short acting insulin should sugars be elevated. Has only used once.   POCT A1c done today was 5.8    Orders:    POCT hemoglobin A1c

## 2024-10-30 NOTE — ASSESSMENT & PLAN NOTE
Occurred post operatively  On lovenox bid for the last 3 months.   CTA 10/17 showed 1.  Patent portal vein reconstruction with stable segmental thrombosis of the superior mesenteric vein.     He has video call scheduled this afternoon with his CT surgeon regarding possibility of d/c this.

## 2024-10-30 NOTE — ASSESSMENT & PLAN NOTE
Reviewed labs done 10/23/24. H/H were low at 8.9/27 respectively  His iron level was low at 34 and ferrtin low at 8.3  Had iron infusion with his last chemo infusion

## 2024-10-30 NOTE — ASSESSMENT & PLAN NOTE
"On crestor.   Lab Results   Component Value Date    CHOLESTEROL 130 12/22/2023    CHOLESTEROL 146 09/06/2023     Lab Results   Component Value Date    HDL 54 12/22/2023    HDL 60 09/06/2023     Lab Results   Component Value Date    TRIG 110 08/19/2024    TRIG 161 (H) 08/12/2024    TRIG 135 08/09/2024     No results found for: \"NONHDLC\"           "

## 2024-10-30 NOTE — ASSESSMENT & PLAN NOTE
S/p laparascopic radical prostatectomy    Follows with urology at [place]  PSA on 10/11/23 was < 0.011  Reportedly had repeat PSA done last week done at [facility]. Follows with Dr Pena

## 2024-12-11 ENCOUNTER — APPOINTMENT (EMERGENCY)
Dept: CT IMAGING | Facility: HOSPITAL | Age: 64
End: 2024-12-11
Payer: COMMERCIAL

## 2024-12-11 ENCOUNTER — HOSPITAL ENCOUNTER (EMERGENCY)
Facility: HOSPITAL | Age: 64
Discharge: HOME | End: 2024-12-11
Attending: EMERGENCY MEDICINE
Payer: COMMERCIAL

## 2024-12-11 VITALS
TEMPERATURE: 97.9 F | SYSTOLIC BLOOD PRESSURE: 140 MMHG | HEART RATE: 89 BPM | RESPIRATION RATE: 18 BRPM | OXYGEN SATURATION: 95 % | DIASTOLIC BLOOD PRESSURE: 80 MMHG

## 2024-12-11 DIAGNOSIS — R42 DIZZINESS: Primary | ICD-10-CM

## 2024-12-11 DIAGNOSIS — R91.1 PULMONARY NODULE: ICD-10-CM

## 2024-12-11 LAB
2HR DELTA HS TROPONIN: -5 NG/L
ALBUMIN SERPL BCG-MCNC: 3.8 G/DL (ref 3.5–5)
ALP SERPL-CCNC: 221 U/L (ref 34–104)
ALT SERPL W P-5'-P-CCNC: 28 U/L (ref 7–52)
ANION GAP SERPL CALCULATED.3IONS-SCNC: 7 MMOL/L (ref 4–13)
APTT PPP: 32 SECONDS (ref 23–34)
AST SERPL W P-5'-P-CCNC: 23 U/L (ref 13–39)
BASOPHILS # BLD AUTO: 0.19 THOUSANDS/ÂΜL (ref 0–0.1)
BASOPHILS NFR BLD AUTO: 0 % (ref 0–1)
BILIRUB SERPL-MCNC: 0.35 MG/DL (ref 0.2–1)
BUN SERPL-MCNC: 16 MG/DL (ref 5–25)
CALCIUM SERPL-MCNC: 8.6 MG/DL (ref 8.4–10.2)
CARDIAC TROPONIN I PNL SERPL HS: 13 NG/L (ref ?–50)
CARDIAC TROPONIN I PNL SERPL HS: 8 NG/L (ref ?–50)
CHLORIDE SERPL-SCNC: 103 MMOL/L (ref 96–108)
CO2 SERPL-SCNC: 26 MMOL/L (ref 21–32)
CREAT SERPL-MCNC: 0.6 MG/DL (ref 0.6–1.3)
EOSINOPHIL # BLD AUTO: 0.04 THOUSAND/ÂΜL (ref 0–0.61)
EOSINOPHIL NFR BLD AUTO: 0 % (ref 0–6)
ERYTHROCYTE [DISTWIDTH] IN BLOOD BY AUTOMATED COUNT: 21.2 % (ref 11.6–15.1)
GFR SERPL CREATININE-BSD FRML MDRD: 107 ML/MIN/1.73SQ M
GLUCOSE SERPL-MCNC: 128 MG/DL (ref 65–140)
GLUCOSE SERPL-MCNC: 144 MG/DL (ref 65–140)
HCT VFR BLD AUTO: 30.1 % (ref 36.5–49.3)
HGB BLD-MCNC: 9.7 G/DL (ref 12–17)
IMM GRANULOCYTES # BLD AUTO: >0.5 THOUSAND/UL (ref 0–0.2)
IMM GRANULOCYTES NFR BLD AUTO: 2 % (ref 0–2)
INR PPP: 0.97 (ref 0.85–1.19)
LYMPHOCYTES # BLD AUTO: 0.89 THOUSANDS/ÂΜL (ref 0.6–4.47)
LYMPHOCYTES NFR BLD AUTO: 2 % (ref 14–44)
MCH RBC QN AUTO: 27.2 PG (ref 26.8–34.3)
MCHC RBC AUTO-ENTMCNC: 32.2 G/DL (ref 31.4–37.4)
MCV RBC AUTO: 85 FL (ref 82–98)
MONOCYTES # BLD AUTO: 0.61 THOUSAND/ÂΜL (ref 0.17–1.22)
MONOCYTES NFR BLD AUTO: 1 % (ref 4–12)
NEUTROPHILS # BLD AUTO: 40.59 THOUSANDS/ÂΜL (ref 1.85–7.62)
NEUTS SEG NFR BLD AUTO: 95 % (ref 43–75)
NRBC BLD AUTO-RTO: 0 /100 WBCS
PLATELET # BLD AUTO: 195 THOUSANDS/UL (ref 149–390)
PMV BLD AUTO: 10.8 FL (ref 8.9–12.7)
POTASSIUM SERPL-SCNC: 3.8 MMOL/L (ref 3.5–5.3)
PROT SERPL-MCNC: 6.5 G/DL (ref 6.4–8.4)
PROTHROMBIN TIME: 13.4 SECONDS (ref 12.3–15)
RBC # BLD AUTO: 3.56 MILLION/UL (ref 3.88–5.62)
SODIUM SERPL-SCNC: 136 MMOL/L (ref 135–147)
WBC # BLD AUTO: 43.28 THOUSAND/UL (ref 4.31–10.16)

## 2024-12-11 PROCEDURE — 84484 ASSAY OF TROPONIN QUANT: CPT | Performed by: EMERGENCY MEDICINE

## 2024-12-11 PROCEDURE — 70496 CT ANGIOGRAPHY HEAD: CPT

## 2024-12-11 PROCEDURE — 36415 COLL VENOUS BLD VENIPUNCTURE: CPT | Performed by: EMERGENCY MEDICINE

## 2024-12-11 PROCEDURE — 99284 EMERGENCY DEPT VISIT MOD MDM: CPT

## 2024-12-11 PROCEDURE — 99285 EMERGENCY DEPT VISIT HI MDM: CPT | Performed by: EMERGENCY MEDICINE

## 2024-12-11 PROCEDURE — 93005 ELECTROCARDIOGRAM TRACING: CPT

## 2024-12-11 PROCEDURE — 85730 THROMBOPLASTIN TIME PARTIAL: CPT | Performed by: EMERGENCY MEDICINE

## 2024-12-11 PROCEDURE — 85025 COMPLETE CBC W/AUTO DIFF WBC: CPT | Performed by: EMERGENCY MEDICINE

## 2024-12-11 PROCEDURE — 96374 THER/PROPH/DIAG INJ IV PUSH: CPT

## 2024-12-11 PROCEDURE — 85610 PROTHROMBIN TIME: CPT | Performed by: EMERGENCY MEDICINE

## 2024-12-11 PROCEDURE — 80053 COMPREHEN METABOLIC PANEL: CPT | Performed by: EMERGENCY MEDICINE

## 2024-12-11 PROCEDURE — 70498 CT ANGIOGRAPHY NECK: CPT

## 2024-12-11 PROCEDURE — 96361 HYDRATE IV INFUSION ADD-ON: CPT

## 2024-12-11 PROCEDURE — 82948 REAGENT STRIP/BLOOD GLUCOSE: CPT

## 2024-12-11 RX ORDER — ONDANSETRON 2 MG/ML
4 INJECTION INTRAMUSCULAR; INTRAVENOUS ONCE
Status: COMPLETED | OUTPATIENT
Start: 2024-12-11 | End: 2024-12-11

## 2024-12-11 RX ADMIN — ONDANSETRON 4 MG: 2 INJECTION, SOLUTION INTRAMUSCULAR; INTRAVENOUS at 14:57

## 2024-12-11 RX ADMIN — IOHEXOL 85 ML: 350 INJECTION, SOLUTION INTRAVENOUS at 16:54

## 2024-12-11 RX ADMIN — SODIUM CHLORIDE 1000 ML: 0.9 INJECTION, SOLUTION INTRAVENOUS at 15:03

## 2024-12-11 NOTE — ED PROVIDER NOTES
Time reflects when diagnosis was documented in both MDM as applicable and the Disposition within this note       Time User Action Codes Description Comment    12/11/2024  5:33 PM Te Saleh [R42] Dizziness     12/11/2024  5:33 PM eT Saleh [R91.1] Pulmonary nodule           ED Disposition       ED Disposition   Discharge    Condition   Stable    Date/Time   Wed Dec 11, 2024  5:33 PM    Comment   Brenton Saez discharge to home/self care.                   Assessment & Plan       Medical Decision Making  Dizziness without any other concerning red flags most likely positional vertigo but would Check CAT scan head and neck to rule out acute CNS etiology and treat symptomatically    Amount and/or Complexity of Data Reviewed  Labs: ordered.  Radiology: ordered.    Risk  Prescription drug management.        ED Course as of 12/11/24 1735   Wed Dec 11, 2024   1731 Leukocytosis secondary to recent Neulasta patient feels much improved at this time       Medications   sodium chloride 0.9 % bolus 1,000 mL (1,000 mL Intravenous New Bag 12/11/24 1503)   ondansetron (ZOFRAN) injection 4 mg (4 mg Intravenous Given 12/11/24 1457)   iohexol (OMNIPAQUE) 350 MG/ML injection (MULTI-DOSE) 85 mL (85 mL Intravenous Given 12/11/24 1654)       ED Risk Strat Scores   HEART Risk Score      Flowsheet Row Most Recent Value   Heart Score Risk Calculator    History 0 Filed at: 12/11/2024 1717   ECG 0 Filed at: 12/11/2024 1717   Age 1 Filed at: 12/11/2024 1717   Risk Factors 1 Filed at: 12/11/2024 1717   Troponin 1 Filed at: 12/11/2024 1717   HEART Score 3 Filed at: 12/11/2024 1717                               SBIRT 20yo+      Flowsheet Row Most Recent Value   Initial Alcohol Screen: US AUDIT-C     1. How often do you have a drink containing alcohol? 0 Filed at: 12/11/2024 1428   2. How many drinks containing alcohol do you have on a typical day you are drinking?  0 Filed at: 12/11/2024 1428   3a. Male UNDER 65: How often do you  have five or more drinks on one occasion? 0 Filed at: 12/11/2024 1428   3b. FEMALE Any Age, or MALE 65+: How often do you have 4 or more drinks on one occassion? 0 Filed at: 12/11/2024 1428   Audit-C Score 0 Filed at: 12/11/2024 1428   MINH: How many times in the past year have you...    Used an illegal drug or used a prescription medication for non-medical reasons? Never Filed at: 12/11/2024 1428                            History of Present Illness       Chief Complaint   Patient presents with    Dizziness     Pt reports sudden onset of dizziness that started about an hour ago. Had chemo on Friday, has not had any issues since. Had this happen last summer, but not this bad. Has a history of benign positional vertigo. Nothing makes dizziness better or worse. Associated with nausea and vomiting.        Past Medical History:   Diagnosis Date    Colon polyp     HL (hearing loss)     left ear. wears hearing aid    Impaired fasting glucose     Iron deficiency anemia     Murmur, heart     Obesity     Pancreatic cancer (HCC)     Pancreatic insufficiency     Prostate cancer (HCC)       Past Surgical History:   Procedure Laterality Date    APPENDECTOMY      COLONOSCOPY      CYST REMOVAL Right 2018    right index finger (knuckle)    LAPAROSCOPIC RETROPUBIC PROSTATECTOMY  06/2022    done at [place]    TONSILLECTOMY      WHIPPLE PROCEDURE/PANCREATICO-DUODENECTOMY  08/2024      Family History   Problem Relation Age of Onset    Diabetes Mother     Arthritis Mother     Stroke Father     Diabetes Sister     Pancreatic cancer Paternal Aunt       Social History     Tobacco Use    Smoking status: Never     Passive exposure: Never    Smokeless tobacco: Never   Vaping Use    Vaping status: Never Used   Substance Use Topics    Alcohol use: Not Currently     Comment: 1 beer per month    Drug use: Never      E-Cigarette/Vaping    E-Cigarette Use Never User       E-Cigarette/Vaping Substances      I have reviewed and agree with the  history as documented.     This is a 64-year-old male who presents for evaluation of vertiginous dizziness with nausea and vomiting that started around 12:00 today he does have a prior similar history.  Denies any loss of vision or difficulty speaking no unilateral numbness or weakness.  No recent trauma or injury.  Is finishing chemotherapy for pancreatic cancer this week.  Zahraa Coma Scale is 15 NIH stroke scale of 0      History provided by:  Patient and spouse  Medical Problem  Location:  Generalized  Quality:  Dizziness  Severity:  Severe  Onset quality:  Gradual  Duration:  3 hours  Timing:  Constant  Progression:  Waxing and waning  Chronicity:  New  Context:  Dizziness with nausea and vomiting that started at noon today  Worsened by:  Change in position  Associated symptoms: nausea and vomiting        Review of Systems   Gastrointestinal:  Positive for nausea and vomiting.   Neurological:  Positive for dizziness.   All other systems reviewed and are negative.          Objective       ED Triage Vitals   Temperature Pulse Blood Pressure Respirations SpO2 Patient Position - Orthostatic VS   12/11/24 1426 12/11/24 1426 12/11/24 1426 12/11/24 1426 12/11/24 1426 12/11/24 1426   97.9 °F (36.6 °C) 74 139/88 18 99 % Sitting      Temp Source Heart Rate Source BP Location FiO2 (%) Pain Score    12/11/24 1426 12/11/24 1426 12/11/24 1426 -- 12/11/24 1503    Oral Monitor Right arm  2      Vitals      Date and Time Temp Pulse SpO2 Resp BP Pain Score FACES Pain Rating User   12/11/24 1700 -- 91 97 % 18 134/79 -- --    12/11/24 1503 -- -- -- -- -- 2 --    12/11/24 1500 -- 84 99 % 19 139/77 -- -- MB   12/11/24 1426 97.9 °F (36.6 °C) 74 99 % 18 139/88 -- -- HR            Physical Exam  Vitals and nursing note reviewed.   Constitutional:       Appearance: He is ill-appearing. He is not toxic-appearing or diaphoretic.   HENT:      Head: Normocephalic and atraumatic.      Right Ear: External ear normal.      Left Ear:  External ear normal.   Eyes:      Extraocular Movements: Extraocular movements intact.      Pupils: Pupils are equal, round, and reactive to light.   Cardiovascular:      Rate and Rhythm: Normal rate and regular rhythm.      Pulses: Normal pulses.      Heart sounds: No murmur heard.     No friction rub. No gallop.   Pulmonary:      Effort: Pulmonary effort is normal. No respiratory distress.      Breath sounds: No wheezing.   Abdominal:      General: Abdomen is flat. There is no distension.      Palpations: Abdomen is soft.      Tenderness: There is no abdominal tenderness.   Musculoskeletal:         General: No swelling, tenderness, deformity or signs of injury. Normal range of motion.      Cervical back: Normal range of motion and neck supple. No rigidity or tenderness.      Right lower leg: No edema.   Skin:     General: Skin is warm.      Coloration: Skin is not jaundiced.      Findings: No bruising, erythema or rash.   Neurological:      General: No focal deficit present.      Mental Status: He is alert and oriented to person, place, and time.      Cranial Nerves: No cranial nerve deficit.      Comments: Zahraa Coma Scale of 15 NIH stroke scale of 0 test of skew is negative head impact test is negative no nystagmus currently   Psychiatric:         Mood and Affect: Mood normal.         Behavior: Behavior normal.         Thought Content: Thought content normal.         Results Reviewed       Procedure Component Value Units Date/Time    HS Troponin I 2hr [[id number]] Collected: 12/11/24 1707    Lab Status: In process Specimen: Blood from Arm, Left Updated: 12/11/24 1711    HS Troponin I 4hr [[id number]]     Lab Status: No result Specimen: Blood     CBC and differential [[id number]]  (Abnormal) Collected: 12/11/24 1457    Lab Status: Final result Specimen: Blood from Central Venous Line Updated: 12/11/24 1546     WBC 43.28 Thousand/uL      RBC 3.56 Million/uL      Hemoglobin 9.7 g/dL      Hematocrit 30.1 %      MCV  85 fL      MCH 27.2 pg      MCHC 32.2 g/dL      RDW 21.2 %      MPV 10.8 fL      Platelets 195 Thousands/uL      nRBC 0 /100 WBCs      Segmented % 95 %      Immature Grans % 2 %      Lymphocytes % 2 %      Monocytes % 1 %      Eosinophils Relative 0 %      Basophils Relative 0 %      Absolute Neutrophils 40.59 Thousands/µL      Absolute Immature Grans >0.50 Thousand/uL      Absolute Lymphocytes 0.89 Thousands/µL      Absolute Monocytes 0.61 Thousand/µL      Eosinophils Absolute 0.04 Thousand/µL      Basophils Absolute 0.19 Thousands/µL     Narrative:      This is an appended report.  These results have been appended to a previously verified report.    HS Troponin 0hr (reflex protocol) [[id number]]  (Normal) Collected: 12/11/24 1457    Lab Status: Final result Specimen: Blood from Central Venous Line Updated: 12/11/24 1533     hs TnI 0hr 13 ng/L     Comprehensive metabolic panel [[id number]]  (Abnormal) Collected: 12/11/24 1457    Lab Status: Final result Specimen: Blood from Central Venous Line Updated: 12/11/24 1529     Sodium 136 mmol/L      Potassium 3.8 mmol/L      Chloride 103 mmol/L      CO2 26 mmol/L      ANION GAP 7 mmol/L      BUN 16 mg/dL      Creatinine 0.60 mg/dL      Glucose 144 mg/dL      Calcium 8.6 mg/dL      AST 23 U/L      ALT 28 U/L      Alkaline Phosphatase 221 U/L      Total Protein 6.5 g/dL      Albumin 3.8 g/dL      Total Bilirubin 0.35 mg/dL      eGFR 107 ml/min/1.73sq m     Narrative:      National Kidney Disease Foundation guidelines for Chronic Kidney Disease (CKD):     Stage 1 with normal or high GFR (GFR > 90 mL/min/1.73 square meters)    Stage 2 Mild CKD (GFR = 60-89 mL/min/1.73 square meters)    Stage 3A Moderate CKD (GFR = 45-59 mL/min/1.73 square meters)    Stage 3B Moderate CKD (GFR = 30-44 mL/min/1.73 square meters)    Stage 4 Severe CKD (GFR = 15-29 mL/min/1.73 square meters)    Stage 5 End Stage CKD (GFR <15 mL/min/1.73 square meters)  Note: GFR calculation is accurate only with a  steady state creatinine    Protime-INR [[id number]]  (Normal) Collected: 12/11/24 1457    Lab Status: Final result Specimen: Blood from Arm, Right Updated: 12/11/24 1523     Protime 13.4 seconds      INR 0.97    Narrative:      INR Therapeutic Range    Indication                                             INR Range      Atrial Fibrillation                                               2.0-3.0  Hypercoagulable State                                    2.0.2.3  Left Ventricular Asist Device                            2.0-3.0  Mechanical Heart Valve                                  -    Aortic(with afib, MI, embolism, HF, LA enlargement,    and/or coagulopathy)                                     2.0-3.0 (2.5-3.5)     Mitral                                                             2.5-3.5  Prosthetic/Bioprosthetic Heart Valve               2.0-3.0  Venous thromboembolism (VTE: VT, PE        2.0-3.0    APTT [[id number]]  (Normal) Collected: 12/11/24 1457    Lab Status: Final result Specimen: Blood from Arm, Right Updated: 12/11/24 1523     PTT 32 seconds     Fingerstick Glucose (POCT) [[id number]]  (Normal) Collected: 12/11/24 1439    Lab Status: Final result Specimen: Blood Updated: 12/11/24 1440     POC Glucose 128 mg/dl             CTA head and neck with and without contrast   Final Interpretation by Jeffrey Cheung MD (12/11 1727)      CT Brain: No acute intracranial normality identified.      CT Angiography:      No large vessel occlusion, high-grade stenosis, or intracranial aneurysm identified on CT angiogram of the head.      No hemodynamically significant stenosis or dissection identified on CT angiogram of the neck.      Stable 1 mm left upper lobe pulmonary nodule. Given the stability for 9 months, this is likely benign in etiology, but recommend continued attention on subsequent studies given the patient's pancreatic cancer.      Coronary artery atherosclerotic calcifications.      The study was marked in EPIC  for immediate notification.            Workstation performed: [id number]             ECG 12 Lead Documentation Only    Date/Time: 2024 3:07 PM    Performed by: Te Saleh DO  Authorized by: Te Saleh DO    ECG reviewed by me, the ED Provider: yes    Patient location:  ED  Rate:     ECG rate:  82  Rhythm:     Rhythm: sinus rhythm    Conduction:     Conduction: normal    T waves:     T waves: normal        ED Medication and Procedure Management   Prior to Admission Medications   Prescriptions Last Dose Informant Patient Reported? Taking?   Simethicone 250 MG CAPS   Yes No   Sig: As needed   clobetasol (TEMOVATE) 0.05 % cream  Self Yes No   Sig: Apply topically 2 (two) times a day   dexamethasone (DECADRON) 4 mg tablet   Yes No   Sig: Take 2 tablets on day 2 and 3 of chemotherapy   enoxaparin (LOVENOX) 100 mg/mL   Yes No   Sig: Inject 100 mg under the skin every 12 (twelve) hours   insulin aspart (NovoLOG FlexPen) 100 UNIT/ML injection pen   Yes No   Sig: Inject 0-6 Units under the skin Three times a day   loperamide (IMODIUM A-D) 2 MG tablet   Yes No   Sig: Take 2 mg by mouth   meclizine (ANTIVERT) 25 mg tablet   No No   Sig: Take 1 tablet (25 mg total) by mouth 3 (three) times a day as needed for dizziness   ondansetron (ZOFRAN) 4 mg tablet   Yes No   Sig: Take 4 mg by mouth every 6 (six) hours as needed for nausea   pancrelipase, Lip-Prot-Amyl, (CREON) 12,000 units capsule   No No   Sig: Take 2 tablets with meals and 1 with snacks   pantoprazole (PROTONIX) 40 mg tablet   Yes No   Sig: Take 40 mg by mouth 2 (two) times a day   rosuvastatin (Crestor) 5 mg tablet  Self Yes No   Si daily   tadalafil (CIALIS) 10 MG tablet   No No   Sig: Take 1/2 or 1 tablet every 36 hours as needed for sexual activity      Facility-Administered Medications: None     Patient's Medications   Discharge Prescriptions    No medications on file     No discharge procedures on file.  ED SEPSIS DOCUMENTATION   Time  reflects when diagnosis was documented in both MDM as applicable and the Disposition within this note       Time User Action Codes Description Comment    12/11/2024  5:33 PM Te Saleh [R42] Dizziness     12/11/2024  5:33 PM Te Saleh [R91.1] Pulmonary nodule                  Te Saleh,   12/11/24 6865

## 2024-12-12 LAB
ATRIAL RATE: 82 BPM
P AXIS: 69 DEGREES
PR INTERVAL: 160 MS
QRS AXIS: 5 DEGREES
QRSD INTERVAL: 88 MS
QT INTERVAL: 394 MS
QTC INTERVAL: 460 MS
T WAVE AXIS: 40 DEGREES
VENTRICULAR RATE: 82 BPM

## 2024-12-12 PROCEDURE — 93010 ELECTROCARDIOGRAM REPORT: CPT | Performed by: INTERNAL MEDICINE

## 2025-01-08 NOTE — PROCEDURE: SUNSCREEN RECOMMENDATIONS
General Sunscreen Counseling: I recommended a broad spectrum sunscreen with a SPF of 30 or higher.  I explained that SPF 30 sunscreens block approximately 97 percent of the sun's harmful rays.  Sunscreens should be applied at least 15 minutes prior to expected sun exposure and then every 2 hours after that as long as sun exposure continues. If swimming or exercising sunscreen should be reapplied every 45 minutes to an hour after getting wet or sweating.  One ounce, or the equivalent of a shot glass full of sunscreen, is adequate to protect the skin not covered by a bathing suit. I also recommended a lip balm with a sunscreen as well. Sun protective clothing can be used in lieu of sunscreen but must be worn the entire time you are exposed to the sun's rays.
Detail Level: Zone
PO intake <=75% of EER >= 1 month, severe muscle wasting and fat loss

## 2025-02-08 LAB
ALBUMIN SERPL-MCNC: 4.4 G/DL (ref 3.9–4.9)
ALP SERPL-CCNC: 119 IU/L (ref 44–121)
ALT SERPL-CCNC: 38 IU/L (ref 0–44)
AST SERPL-CCNC: 42 IU/L (ref 0–40)
BILIRUB SERPL-MCNC: 0.2 MG/DL (ref 0–1.2)
BUN SERPL-MCNC: 13 MG/DL (ref 8–27)
BUN/CREAT SERPL: 16 (ref 10–24)
CALCIUM SERPL-MCNC: 9.4 MG/DL (ref 8.6–10.2)
CHLORIDE SERPL-SCNC: 105 MMOL/L (ref 96–106)
CHOLEST SERPL-MCNC: 132 MG/DL (ref 100–199)
CHOLEST/HDLC SERPL: 2.5 RATIO (ref 0–5)
CO2 SERPL-SCNC: 21 MMOL/L (ref 20–29)
CREAT SERPL-MCNC: 0.8 MG/DL (ref 0.76–1.27)
EGFR: 99 ML/MIN/1.73
GLOBULIN SER-MCNC: 2.4 G/DL (ref 1.5–4.5)
GLUCOSE SERPL-MCNC: 107 MG/DL (ref 70–99)
HDLC SERPL-MCNC: 53 MG/DL
LDLC SERPL CALC-MCNC: 63 MG/DL (ref 0–99)
POTASSIUM SERPL-SCNC: 4.7 MMOL/L (ref 3.5–5.2)
PROT SERPL-MCNC: 6.8 G/DL (ref 6–8.5)
SL AMB VLDL CHOLESTEROL CALC: 16 MG/DL (ref 5–40)
SODIUM SERPL-SCNC: 141 MMOL/L (ref 134–144)
TRIGL SERPL-MCNC: 83 MG/DL (ref 0–149)

## 2025-02-10 ENCOUNTER — RESULTS FOLLOW-UP (OUTPATIENT)
Dept: FAMILY MEDICINE CLINIC | Facility: CLINIC | Age: 65
End: 2025-02-10

## 2025-03-03 DIAGNOSIS — Z01.84 IMMUNITY STATUS TESTING: Primary | ICD-10-CM

## 2025-03-08 LAB — MEV IGG SER IA-ACNC: <13.5 AU/ML

## 2025-03-10 ENCOUNTER — RESULTS FOLLOW-UP (OUTPATIENT)
Dept: FAMILY MEDICINE CLINIC | Facility: CLINIC | Age: 65
End: 2025-03-10

## 2025-03-11 ENCOUNTER — TELEPHONE (OUTPATIENT)
Dept: FAMILY MEDICINE CLINIC | Facility: CLINIC | Age: 65
End: 2025-03-11

## 2025-03-11 ENCOUNTER — CLINICAL SUPPORT (OUTPATIENT)
Dept: FAMILY MEDICINE CLINIC | Facility: CLINIC | Age: 65
End: 2025-03-11
Payer: COMMERCIAL

## 2025-03-11 DIAGNOSIS — Z23 ENCOUNTER FOR IMMUNIZATION: Primary | ICD-10-CM

## 2025-03-11 PROCEDURE — 90707 MMR VACCINE SC: CPT

## 2025-03-11 PROCEDURE — 90471 IMMUNIZATION ADMIN: CPT

## 2025-03-11 NOTE — PROGRESS NOTES
The last labs I can see are from December, but I do see oncology  note from February, which stated you were being treated with the mFOLFIRINOX regimen.   As long as this is completed, you can get the MMR.      Dr. Steen.

## 2025-04-08 ENCOUNTER — CLINICAL SUPPORT (OUTPATIENT)
Dept: FAMILY MEDICINE CLINIC | Facility: CLINIC | Age: 65
End: 2025-04-08
Payer: COMMERCIAL

## 2025-04-08 DIAGNOSIS — Z23 ENCOUNTER FOR IMMUNIZATION: Primary | ICD-10-CM

## 2025-04-08 PROCEDURE — 90707 MMR VACCINE SC: CPT | Performed by: FAMILY MEDICINE

## 2025-04-08 PROCEDURE — 90471 IMMUNIZATION ADMIN: CPT | Performed by: FAMILY MEDICINE

## 2025-04-29 ENCOUNTER — PATIENT MESSAGE (OUTPATIENT)
Dept: FAMILY MEDICINE CLINIC | Facility: CLINIC | Age: 65
End: 2025-04-29

## 2025-05-05 DIAGNOSIS — N52.9 ERECTILE DYSFUNCTION, UNSPECIFIED ERECTILE DYSFUNCTION TYPE: ICD-10-CM

## 2025-05-05 RX ORDER — TADALAFIL 10 MG/1
TABLET ORAL
Qty: 10 TABLET | Refills: 1 | Status: SHIPPED | OUTPATIENT
Start: 2025-05-05

## 2025-05-05 NOTE — TELEPHONE ENCOUNTER
"Per other encounter: \"Also, can I request a script for Tadalafil 10 mg tablets? My Rx from my previous PCP ran out. Thanks. \"  "

## 2025-05-05 NOTE — PROGRESS NOTES
Name: Brenton Saez      : 1960      MRN: [id number]  Encounter Provider: Mary Steen DO  Encounter Date: 2025   Encounter department: [facility] PRIMARY CARE    Assessment & Plan         History of Present Illness   {?Quick Links Encounters * My Last Note * Last Note in Specialty * Snapshot * Since Last Visit * History :18494}  HPI  Patient is a 65 year old male with pancreatic cancer, prostate cancer, impaired fasting glucose, hyperlipidemia being seen today to discuss medication for upcoming trip.   Is traveling to [place] and wants rx for medication for altitude sickness.     Had labs done in February. His BUN and creatinine were 14/0.72 respectively. AST/ALT were 43/45 respectively  Discussed potential side effects of this medication including flushing, photosensitivity, polyruria.   Should start medication day prior to ascent, while at high altitude and until descent.   Dosing is 125 mg bid   Review of Systems  Past Medical History:   Diagnosis Date    Colon polyp     HL (hearing loss)     left ear. wears hearing aid    Impaired fasting glucose     Iron deficiency anemia     Murmur, heart     Obesity     Pancreatic cancer (HCC)     Pancreatic insufficiency     Prostate cancer (HCC)      Past Surgical History:   Procedure Laterality Date    APPENDECTOMY      COLONOSCOPY      CYST REMOVAL Right 2018    right index finger (knuckle)    LAPAROSCOPIC RETROPUBIC PROSTATECTOMY  2022    [facility] [place]    [facility]      [facility]EDURE/PANCREATICO-DUODENECTOMY  2024     Family History   Problem Relation Age of Onset    Diabetes Mother     Arthritis Mother     Stroke Father     Diabetes Sister     Pancreatic cancer Paternal Aunt      Social History     Tobacco Use    Smoking status: Never     Passive exposure: Never    Smokeless tobacco: Never   Vaping Use    Vaping status: Never Used   Substance and Sexual Activity    Alcohol use: Not Currently     Comment: 1 beer per  month    Drug use: Never    Sexual activity: Yes     Partners: Female     Birth control/protection: Male Sterilization, None     Current Outpatient Medications on File Prior to Visit   Medication Sig    loperamide (IMODIUM A-D) 2 MG tablet Take 2 mg by mouth    meclizine (ANTIVERT) 25 mg tablet Take 1 tablet (25 mg total) by mouth 3 (three) times a day as needed for dizziness    ondansetron (ZOFRAN) 4 mg tablet Take 4 mg by mouth every 6 (six) hours as needed for nausea    pancrelipase, Lip-Prot-Amyl, (CREON) 12,000 units capsule Take 2 tablets with meals and 1 with snacks    pantoprazole (PROTONIX) 40 mg tablet Take 40 mg by mouth 2 (two) times a day    rosuvastatin (Crestor) 5 mg tablet 1 daily    Simethicone 250 MG CAPS As needed    tadalafil (CIALIS) 10 MG tablet Take 1/2 or 1 tablet every 36 hours as needed for sexual activity     Allergies   Allergen Reactions    Penicillins Other (See Comments)     Unsure as a child    Pollen Extract Other (See Comments)     Immunization History   Administered Date(s) Administered    COVID-19 PFIZER VACCINE 0.3 ML IM 04/12/2021, 05/06/2021, 11/18/2021, 10/12/2022    COVID-19 Pfizer Vac BIVALENT Rudy-sucrose 12 Yr+ IM 10/12/2022    COVID-19 Pfizer mRNA vacc PF rudy-sucrose 12 yr and older (Comirnaty) 10/20/2023, 03/20/2024, 10/07/2024    COVID-19 Pfizer vac (Rudy-sucrose, gray cap) 12 yr+ IM 06/05/2022    INFLUENZA 10/03/2015, 09/09/2017, 10/04/2021, 09/30/2022, 09/12/2023    Influenza Injectable, MDCK 0.5 mL 10/07/2024    MMR 03/11/2025, 04/08/2025    Pneumococcal Conjugate Vaccine 20-valent (Pcv20), Polysace 03/20/2024    Respiratory Syncytial Virus Vaccine (Recombinant) 10/20/2023    Tdap 11/05/2009, 02/08/2021, 09/11/2023    Typhoid, ViCPs 05/13/2017     Objective {?Quick Links Trend Vitals * Enter New Vitals * Results Review * Timeline (Adult) * Labs * Imaging * Cardiology * Procedures * Lung Cancer Screening * Surgical eConsent :61824}  There were no vitals taken for  this visit.    Physical Exam  {Administrative / Billing Section (Optional):15419}

## 2025-05-06 ENCOUNTER — TELEMEDICINE (OUTPATIENT)
Dept: FAMILY MEDICINE CLINIC | Facility: CLINIC | Age: 65
End: 2025-05-06
Payer: COMMERCIAL

## 2025-05-06 DIAGNOSIS — Z29.89 ALTITUDE SICKNESS PREVENTATIVE MEASURES: Primary | ICD-10-CM

## 2025-05-06 DIAGNOSIS — Z71.84 TRAVEL ADVICE ENCOUNTER: ICD-10-CM

## 2025-05-06 DIAGNOSIS — Z71.84 ENCOUNTER FOR COUNSELING FOR TRAVEL: ICD-10-CM

## 2025-05-06 DIAGNOSIS — C25.0 ADENOCARCINOMA OF HEAD OF PANCREAS (HCC): ICD-10-CM

## 2025-05-06 PROCEDURE — 99213 OFFICE O/P EST LOW 20 MIN: CPT | Performed by: FAMILY MEDICINE

## 2025-05-06 RX ORDER — ACETAZOLAMIDE 125 MG/1
125 TABLET ORAL 2 TIMES DAILY
Qty: 20 TABLET | Refills: 0 | Status: SHIPPED | OUTPATIENT
Start: 2025-05-06

## 2025-05-06 NOTE — PROGRESS NOTES
Virtual Regular VisitName: Brenton Saez      : 1960      MRN: [id number]  Encounter Provider: Mary Steen DO  Encounter Date: 2025   Encounter department: [facility] PRIMARY CARE  :  Assessment & Plan  Altitude sickness preventative measures  Rx for diamox 125 mg bid sent to pharmacy  Should take day prior to ascent  Discussed potential side effects of the med  Reviewed his recent labs. Normal GFR.        Travel advice encounter             History of Present Illness     HPI  Patient is a 65 year old male who is being seen today for request for medication for altitude sickness,   He and his wife are traveling to hike the [place] and is wondering about getting medication for this.     Review of Systems    Objective   There were no vitals taken for this visit.    Physical Exam  Nursing note reviewed.   Constitutional:       General: He is not in acute distress.     Appearance: Normal appearance. He is not ill-appearing, toxic-appearing or diaphoretic.   HENT:      Head: Normocephalic and atraumatic.   Eyes:      Conjunctiva/sclera: Conjunctivae normal.   Pulmonary:      Effort: Pulmonary effort is normal. No respiratory distress.   Neurological:      General: No focal deficit present.      Mental Status: He is alert.   Psychiatric:         Mood and Affect: Mood normal.         Administrative Statements   Encounter provider Mary Steen DO    The Patient is located at Home and in the following state in which I hold an active license [place].    The patient was identified by name and date of birth. Brenton Saez was informed that this is a telemedicine visit and that the visit is being conducted through the Epic Embedded platform. He agrees to proceed..  My office door was closed. No one else was in the room.  He acknowledged consent and understanding of privacy and security of the video platform. The patient has agreed to participate and understands they can discontinue the  visit at any time.    I have spent a total time of 10 minutes in caring for this patient on the day of the visit/encounter including Risks and benefits of tx options, Instructions for management, Impressions, Documenting in the medical record, and Reviewing/placing orders in the medical record (including tests, medications, and/or procedures), not including the time spent for establishing the audio/video connection.

## 2025-05-06 NOTE — ASSESSMENT & PLAN NOTE
Orders:    pancrelipase, Lip-Prot-Amyl, (CREON) 12,000 units capsule; Take 2 tablets with meals and 1 with snacks

## 2025-05-06 NOTE — PROGRESS NOTES
Name: Brenton Saez      : 1960      MRN: [id number]  Encounter Provider: Mary Steen DO  Encounter Date: 2025   Encounter department: [facility] PRIMARY CARE    Assessment & Plan  Altitude sickness preventative measures  Rx for diamox 125 mg bid sent to pharmacy  Discussed importance of taking day prior to hike/ascent and while at high altitude  We discussed potential side effects. Important to stay well hydrated  Reviewed his recent labs  Orders:  •  acetaZOLAMIDE (DIAMOX) 125 mg tablet; Take 1 tablet (125 mg total) by mouth 2 (two) times a day    Encounter for counseling for travel         Adenocarcinoma of head of pancreas (HCC)    Orders:  •  pancrelipase, Lip-Prot-Amyl, (CREON) 12,000 units capsule; Take 2 tablets with meals and 1 with snacks         History of Present Illness     HPI    Patient is a 65 year old male with pancreatic cancer, prostate cancer, impaired fasting glucose, hyperlipidemia being seen today to discuss medication for upcoming trip.   Is traveling to the [facility] and wants rx for medication for altitude sickness. Will be doing some hiking while there.     Had labs done in February. His BUN and creatinine were 14/0.72 respectively. AST/ALT were 43/45 respectively  Discussed potential side effects of this medication including flushing, photosensitivity, polyruria.   Should start medication day prior to ascent, while at high altitude and until descent.   Dosing is 125 mg bid  Review of Systems  Past Medical History:   Diagnosis Date   • Colon polyp    • HL (hearing loss)     left ear. wears hearing aid   • Impaired fasting glucose    • Iron deficiency anemia    • Murmur, heart    • Obesity    • Pancreatic cancer (HCC)    • Pancreatic insufficiency    • Prostate cancer (HCC)      Past Surgical History:   Procedure Laterality Date   • APPENDECTOMY     • COLONOSCOPY     • CYST REMOVAL Right     right index finger (knuckle)   •  LAPAROSCOPIC RETROPUBIC PROSTATECTOMY  06/2022    done at [facility]   • TONSILLECTOMY     • WHIPPLE PROCEDURE/PANCREATICO-DUODENECTOMY  08/2024     Family History   Problem Relation Age of Onset   • Diabetes Mother    • Arthritis Mother    • Stroke Father    • Diabetes Sister    • Pancreatic cancer Paternal Aunt      Social History     Tobacco Use   • Smoking status: Never     Passive exposure: Never   • Smokeless tobacco: Never   Vaping Use   • Vaping status: Never Used   Substance and Sexual Activity   • Alcohol use: Not Currently     Comment: 1 beer per month   • Drug use: Never   • Sexual activity: Yes     Partners: Female     Birth control/protection: Male Sterilization, None     Current Outpatient Medications on File Prior to Visit   Medication Sig   • ondansetron (ZOFRAN) 4 mg tablet Take 4 mg by mouth every 6 (six) hours as needed for nausea   • pantoprazole (PROTONIX) 40 mg tablet Take 40 mg by mouth 2 (two) times a day   • rosuvastatin (Crestor) 5 mg tablet 1 daily   • Simethicone 250 MG CAPS As needed   • tadalafil (CIALIS) 10 MG tablet Take 1/2 or 1 tablet every 36 hours as needed for sexual activity   • [DISCONTINUED] pancrelipase, Lip-Prot-Amyl, (CREON) 12,000 units capsule Take 2 tablets with meals and 1 with snacks   • [DISCONTINUED] loperamide (IMODIUM A-D) 2 MG tablet Take 2 mg by mouth   • [DISCONTINUED] meclizine (ANTIVERT) 25 mg tablet Take 1 tablet (25 mg total) by mouth 3 (three) times a day as needed for dizziness     Allergies   Allergen Reactions   • Penicillins Other (See Comments) and Hives     Unsure as a child   • Pollen Extract Other (See Comments)     Immunization History   Administered Date(s) Administered   • COVID-19 PFIZER VACCINE 0.3 ML IM 04/12/2021, 05/06/2021, 11/18/2021, 10/12/2022   • COVID-19 Pfizer Vac BIVALENT Rudy-sucrose 12 Yr+ IM 10/12/2022   • COVID-19 Pfizer mRNA vacc PF rudy-sucrose 12 yr and older (Comirnaty) 10/20/2023, 03/20/2024, 10/07/2024   • COVID-19 Pfizer  vac (Rudy-sucrose, gray cap) 12 yr+ IM 06/05/2022   • INFLUENZA 10/03/2015, 09/09/2017, 10/04/2021, 09/30/2022, 09/12/2023   • Influenza Injectable, MDCK 0.5 mL 10/07/2024   • MMR 03/11/2025, 04/08/2025   • Pneumococcal Conjugate Vaccine 20-valent (Pcv20), Polysace 03/20/2024   • Respiratory Syncytial Virus Vaccine (Recombinant) 10/20/2023   • Tdap 11/05/2009, 02/08/2021, 09/11/2023   • Typhoid, ViCPs 05/13/2017     Objective   There were no vitals taken for this visit.    Physical Exam  Nursing note reviewed.   Constitutional:       General: He is not in acute distress.     Appearance: Normal appearance. He is not ill-appearing, toxic-appearing or diaphoretic.   Eyes:      Conjunctiva/sclera: Conjunctivae normal.   Pulmonary:      Effort: Pulmonary effort is normal. No respiratory distress.   Neurological:      General: No focal deficit present.      Mental Status: He is alert.   Psychiatric:         Mood and Affect: Mood normal.

## 2025-07-10 DIAGNOSIS — Z00.6 ENCOUNTER FOR EXAMINATION FOR NORMAL COMPARISON OR CONTROL IN CLINICAL RESEARCH PROGRAM: ICD-10-CM

## 2025-07-11 ENCOUNTER — OFFICE VISIT (OUTPATIENT)
Dept: FAMILY MEDICINE CLINIC | Facility: CLINIC | Age: 65
End: 2025-07-11
Payer: COMMERCIAL

## 2025-07-11 ENCOUNTER — APPOINTMENT (OUTPATIENT)
Dept: LAB | Facility: HOSPITAL | Age: 65
End: 2025-07-11

## 2025-07-11 VITALS
BODY MASS INDEX: 30.38 KG/M2 | DIASTOLIC BLOOD PRESSURE: 70 MMHG | SYSTOLIC BLOOD PRESSURE: 130 MMHG | OXYGEN SATURATION: 98 % | WEIGHT: 208.4 LBS | TEMPERATURE: 98.4 F | HEART RATE: 74 BPM

## 2025-07-11 DIAGNOSIS — G89.29 CHRONIC RIGHT SHOULDER PAIN: Primary | ICD-10-CM

## 2025-07-11 DIAGNOSIS — Z00.6 ENCOUNTER FOR EXAMINATION FOR NORMAL COMPARISON OR CONTROL IN CLINICAL RESEARCH PROGRAM: ICD-10-CM

## 2025-07-11 DIAGNOSIS — M25.511 CHRONIC RIGHT SHOULDER PAIN: Primary | ICD-10-CM

## 2025-07-11 PROCEDURE — 99213 OFFICE O/P EST LOW 20 MIN: CPT | Performed by: FAMILY MEDICINE

## 2025-07-11 PROCEDURE — 36415 COLL VENOUS BLD VENIPUNCTURE: CPT

## 2025-07-11 NOTE — PROGRESS NOTES
Name: Brenton Saez      : 1960      MRN: [id number]  Encounter Provider: Mary Steen DO  Encounter Date: 2025   Encounter department: [facility] PRIMARY CARE    Assessment & Plan  Chronic right shoulder pain  Has done a course of PT and no improvement  Check xray and refer to ortho  Orders:    XR shoulder 2+ vw right; Future    Ambulatory Referral to Orthopedic Surgery; Future         History of Present Illness     HPI  Patient is a 65 year old male with hyperlipidemia, history of prostate cancer and history of pancreatic cancer who is being seen  today for complaint of right shoulder pain that has been present for 1.5 years.   Did course of PT at [facility] in [place]  last year as referred by previous PCP. PT did not really help  Bothers him when he lifts overhead or pushes. Range of motion is impacted.     Review of Systems  Past Medical History[1]  Past Surgical History[2]  Family History[3]  Social History[4]  Medications[5]  Allergies   Allergen Reactions    Penicillins Other (See Comments) and Hives     Unsure as a child    Pollen Extract Other (See Comments)     Immunization History   Administered Date(s) Administered    COVID-19 PFIZER VACCINE 0.3 ML IM 2021, 2021, 2021, 10/12/2022    COVID-19 Pfizer Vac BIVALENT Erica-sucrose 12 Yr+ IM 10/12/2022    COVID-19 Pfizer mRNA vacc PF erica-sucrose 12 yr and older (Comirnaty) 10/20/2023, 2024, 10/07/2024    COVID-19 Pfizer vac (Erica-sucrose, gray cap) 12 yr+ IM 2022    INFLUENZA 10/03/2015, 2017, 10/04/2021, 2022, 2023    Influenza Injectable, MDCK 0.5 mL 10/07/2024    MMR 2025, 2025    Pneumococcal Conjugate Vaccine 20-valent (Pcv20), Polysace 2024    Respiratory Syncytial Virus Vaccine (Recombinant) 10/20/2023    Tdap 2009, 2021, 2023    Typhoid, ViCPs 2017     Objective   There were no vitals taken for this visit.    Physical  Exam  Vitals and nursing note reviewed.   Constitutional:       General: He is not in acute distress.     Appearance: Normal appearance. He is not ill-appearing, toxic-appearing or diaphoretic.     Eyes:      Conjunctiva/sclera: Conjunctivae normal.       Cardiovascular:      Rate and Rhythm: Normal rate and regular rhythm.      Heart sounds: No murmur heard.  Pulmonary:      Effort: Pulmonary effort is normal.      Breath sounds: Normal breath sounds.     Musculoskeletal:      Cervical back: Normal range of motion and neck supple.      Comments: Right shoulder with tenderness on palpation over bicipital groove.   Has decreased range of motion   No weakness     Neurological:      Mental Status: He is alert.     Psychiatric:         Mood and Affect: Mood normal.                [1]   Past Medical History:  Diagnosis Date    Colon polyp     HL (hearing loss)     left ear. wears hearing aid    Impaired fasting glucose     Iron deficiency anemia     Murmur, heart     Obesity     Pancreatic cancer (HCC)     Pancreatic insufficiency     Prostate cancer (HCC)    [2]   Past Surgical History:  Procedure Laterality Date    APPENDECTOMY      COLONOSCOPY      CYST REMOVAL Right 2018    right index finger (knuckle)    LAPAROSCOPIC RETROPUBIC PROSTATECTOMY  06/2022    done at [facility]    TONSILLECTOMY      WHIPPLE PROCEDURE/PANCREATICO-DUODENECTOMY  08/2024   [3]   Family History  Problem Relation Name Age of Onset    Diabetes Mother Sweetie Kimbroughangelitoleonarda     Arthritis Mother Sweetie MITCHELL Hernánleonarda     Stroke Father Wicho GALEANOKaylie Saez     Diabetes Sister Sherin Diaz     Pancreatic cancer Paternal Aunt     [4]   Social History  Tobacco Use    Smoking status: Never     Passive exposure: Never    Smokeless tobacco: Never   Vaping Use    Vaping status: Never Used   Substance and Sexual Activity    Alcohol use: Not Currently     Comment: 1 beer per month    Drug use: Never    Sexual activity: Yes     Partners: Female     Birth  control/protection: Male Sterilization, None   [5]   Current Outpatient Medications on File Prior to Visit   Medication Sig    acetaZOLAMIDE (DIAMOX) 125 mg tablet Take 1 tablet (125 mg total) by mouth 2 (two) times a day    ondansetron (ZOFRAN) 4 mg tablet Take 4 mg by mouth every 6 (six) hours as needed for nausea    pancrelipase, Lip-Prot-Amyl, (CREON) 12,000 units capsule Take 2 tablets with meals and 1 with snacks    pantoprazole (PROTONIX) 40 mg tablet Take 40 mg by mouth 2 (two) times a day    rosuvastatin (Crestor) 5 mg tablet 1 daily    Simethicone 250 MG CAPS As needed    tadalafil (CIALIS) 10 MG tablet Take 1/2 or 1 tablet every 36 hours as needed for sexual activity

## 2025-07-15 ENCOUNTER — APPOINTMENT (OUTPATIENT)
Dept: RADIOLOGY | Facility: CLINIC | Age: 65
End: 2025-07-15
Attending: PHYSICIAN ASSISTANT
Payer: COMMERCIAL

## 2025-07-15 VITALS — HEIGHT: 70 IN | WEIGHT: 211 LBS | BODY MASS INDEX: 30.21 KG/M2

## 2025-07-15 DIAGNOSIS — M75.81 RIGHT ROTATOR CUFF TENDINITIS: ICD-10-CM

## 2025-07-15 DIAGNOSIS — G89.29 CHRONIC RIGHT SHOULDER PAIN: Primary | ICD-10-CM

## 2025-07-15 DIAGNOSIS — M25.511 CHRONIC RIGHT SHOULDER PAIN: ICD-10-CM

## 2025-07-15 DIAGNOSIS — M67.911 DYSFUNCTION OF RIGHT ROTATOR CUFF: ICD-10-CM

## 2025-07-15 DIAGNOSIS — M25.511 CHRONIC RIGHT SHOULDER PAIN: Primary | ICD-10-CM

## 2025-07-15 DIAGNOSIS — G89.29 CHRONIC RIGHT SHOULDER PAIN: ICD-10-CM

## 2025-07-15 PROCEDURE — 73030 X-RAY EXAM OF SHOULDER: CPT

## 2025-07-15 PROCEDURE — 20610 DRAIN/INJ JOINT/BURSA W/O US: CPT | Performed by: PHYSICIAN ASSISTANT

## 2025-07-15 PROCEDURE — 99203 OFFICE O/P NEW LOW 30 MIN: CPT | Performed by: PHYSICIAN ASSISTANT

## 2025-07-15 RX ORDER — LIDOCAINE HYDROCHLORIDE 10 MG/ML
2 INJECTION, SOLUTION INFILTRATION; PERINEURAL
Status: COMPLETED | OUTPATIENT
Start: 2025-07-15 | End: 2025-07-15

## 2025-07-15 RX ORDER — TRIAMCINOLONE ACETONIDE 40 MG/ML
80 INJECTION, SUSPENSION INTRA-ARTICULAR; INTRAMUSCULAR
Status: COMPLETED | OUTPATIENT
Start: 2025-07-15 | End: 2025-07-15

## 2025-07-15 RX ADMIN — LIDOCAINE HYDROCHLORIDE 2 ML: 10 INJECTION, SOLUTION INFILTRATION; PERINEURAL at 16:00

## 2025-07-15 RX ADMIN — TRIAMCINOLONE ACETONIDE 80 MG: 40 INJECTION, SUSPENSION INTRA-ARTICULAR; INTRAMUSCULAR at 16:00

## 2025-07-21 LAB
APOB+LDLR+PCSK9 GENE MUT ANL BLD/T: NOT DETECTED
BRCA1+BRCA2 DEL+DUP + FULL MUT ANL BLD/T: NOT DETECTED
MLH1+MSH2+MSH6+PMS2 GN DEL+DUP+FUL M: NOT DETECTED

## 2025-07-22 ENCOUNTER — EVALUATION (OUTPATIENT)
Age: 65
End: 2025-07-22
Payer: COMMERCIAL

## 2025-07-22 DIAGNOSIS — M25.511 CHRONIC RIGHT SHOULDER PAIN: Primary | ICD-10-CM

## 2025-07-22 DIAGNOSIS — M75.81 RIGHT ROTATOR CUFF TENDINITIS: ICD-10-CM

## 2025-07-22 DIAGNOSIS — M67.911 DYSFUNCTION OF RIGHT ROTATOR CUFF: ICD-10-CM

## 2025-07-22 DIAGNOSIS — G89.29 CHRONIC RIGHT SHOULDER PAIN: Primary | ICD-10-CM

## 2025-07-22 PROCEDURE — 97140 MANUAL THERAPY 1/> REGIONS: CPT

## 2025-07-22 PROCEDURE — 97161 PT EVAL LOW COMPLEX 20 MIN: CPT

## 2025-07-22 PROCEDURE — 97110 THERAPEUTIC EXERCISES: CPT

## 2025-07-22 NOTE — PROGRESS NOTES
PT Evaluation     Today's date: 2025  Patient name: Brenton Saez  : 1960  MRN: [id number]  Referring provider: Charles Huff PA*  Dx: No diagnosis found.               Assessment  Impairments: abnormal coordination, abnormal or restricted ROM, impaired physical strength, pain with function and scapular dyskinesis  Symptom irritability: moderate    Assessment details: Pt presents with anterior right shoulder impingement with bicipital and supraspinatus irritation.   Understanding of Dx/Px/POC: excellent     Prognosis: excellent    Goals  GOALS / CRITERIA FOR DISCHARGE  Short Terms Goals  (Estimated time: 6 weeks)  Pain at worst less than or equal to 3/10 for all daily routine tasks in 4 weeks.  Patient will increase Rt shoulder ROM to 90% to be able to wash hair/back independently in 4 weeks.  Patient will increase Rt shoulder strength to greater than or equal to 4/5 to be able to put dishes away in 4 weeks.  Patient will score less than or equal to 15 % impaired on SPADI for decreased self-rated disability in 4 weeks.  Patient will report greater ease with ADLs in 4 weeks.  Patient will be independent with HEP for improved adherence to program in 4 weeks.      Long Term Goals  (Estimated time: 12 weeks)  Pain at worst less than or equal to 1/10 for all daily routine tasks in 8 weeks.  Patient will increase Rt shoulder ROM to 100 % to be able to perform ADLs independently in 8 weeks.  Patient will increase Rt shoulder strength greater than or equal to 4+/5 to be able to put dishes away in 8 weeks  Patient will increase Rt scapular strength greater than or equal to 4/5 to be able to performing overhead lifting in 8 weeks  Patient will score less than or equal to 5 % impaired on  SPADI for decreased self-rated disability in 8 weeks.  Patient will be able to sleep throughout the night without waking greater than 1 time secondary to pain in 8 weeks.  Patient will report no difficulty and require  no adaptation with ADLs in 8 weeks   Patient will be able to return to work without difficulty or compensations in 8 weeks.       Plan  Patient would benefit from: skilled physical therapy    Planned therapy interventions: joint mobilization, manual therapy, therapeutic activities, stretching, strengthening, therapeutic exercise and home exercise program    Frequency: 2x week  Duration in weeks: 12  Plan of Care beginning date: 2025  Plan of Care expiration date: 10/14/2025  Treatment plan discussed with: patient    Subjective Evaluation    History of Present Illness  Date of onset: 2024  Mechanism of injury: Right shoulder pain started 2024.  Pt was lifting heavy and went to PT for bicipital tendonitis.  2024 pt was diagnosed with pancreatic cancer and started Chemo in April.  Pt had surgery (whipple procedure in August).  Pt started lifting 2025.  Pt has a chemo port superior Rt pec.  Pt had a recent cortisone injection 7/15/25 with improvement.           Not a recurrent problem   Quality of life: good    Patient Goals  Patient goals for therapy: increased strength, decreased pain, independence with ADLs/IADLs, return to sport/leisure activities and increased motion    Pain  Current pain rating: 3  At best pain ratin  At worst pain ratin  Quality: discomfort, dull ache and tight  Relieving factors: rest  Aggravating factors: overhead activity  Progression: improved    Treatments  Previous treatment: injection treatment    Objective     Static Posture     Scapulae  Right anteriorly tipped.    Passive Range of Motion   Left Shoulder   Normal passive range of motion    Right Shoulder   Flexion: 150 degrees with pain  Abduction: 140 degrees with pain  External rotation 90°: 80 degrees with pain  Internal rotation 90°: 55 degrees     Strength/Myotome Testing     Left Shoulder   Normal muscle strength    Isolated Muscles   Lower trapezius: WFL   Middle trapezius: WFL     Right Shoulder      Planes of Motion   Flexion: 3+   Abduction: 4   External rotation at 0°: 4-   Internal rotation at 0°: 4+     Isolated Muscles   Lower trapezius: 4-   Middle trapezius: 4-     Additional Strength Details  Pain with resisted flexion > abduction. (Pain anterior)      Tests     Right Shoulder   Positive Neer's and Speed's.     Additional Tests Details  + Neer Impingement > Speed's           Precautions: none      Manuals 7/22            Post GH mobs sm            MFR post cuff sm                                      Neuro Re-Ed                                                                                                        Ther Ex 7/22            Scap retraction 3 sec x 15            S/L ER  3# 3 x 10            Bent over MT/LT with retract 2# 2 x 10            Scaption / Flexion with retraction 2# 2 x 10                                                                Ther Activity                                       Gait Training                                       Modalities

## 2025-07-22 NOTE — LETTER
2025    Charles Huff PA-C  [address] [place] [id number]    Patient: Brenton Saez   YOB: 1960   Date of Visit: 2025     Encounter Diagnosis     ICD-10-CM    1. Chronic right shoulder pain  M25.511 Ambulatory Referral to Physical Therapy    G89.29       2. Right rotator cuff tendinitis  M75.81 Ambulatory Referral to Physical Therapy      3. Dysfunction of right rotator cuff  M67.911 Ambulatory Referral to Physical Therapy          Dear Dr. Charles Huff PA-C:    Thank you for your recent referral of Brenton Saez. Please review the attached evaluation summary from Brenton's recent visit.     Please verify that you agree with the plan of care by signing the attached order.     If you have any questions or concerns, please do not hesitate to call.     I sincerely appreciate the opportunity to share in the care of one of your patients and hope to have another opportunity to work with you in the near future.       Sincerely,    Aleksandar Harp, PT      Referring Provider:      I certify that I have read the below Plan of Care and certify the need for these services furnished under this plan of treatment while under my care.                    Charles Huff PA-C  [address] [place] [address]  [facility] [id number]  Via In Basket          PT Evaluation     Today's date: 2025  Patient name: Brenton Saez  : 1960  MRN: [id number]  Referring provider: Charles Huff PA*  Dx: No diagnosis found.               Assessment  Impairments: abnormal coordination, abnormal or restricted ROM, impaired physical strength, pain with function and scapular dyskinesis  Symptom irritability: moderate    Assessment details: Pt presents with anterior right shoulder impingement with bicipital and supraspinatus irritation.   Understanding of Dx/Px/POC: excellent     Prognosis: excellent    Goals  GOALS / CRITERIA FOR DISCHARGE  Short Terms Goals  (Estimated  time: 6 weeks)  Pain at worst less than or equal to 3/10 for all daily routine tasks in 4 weeks.  Patient will increase Rt shoulder ROM to 90% to be able to wash hair/back independently in 4 weeks.  Patient will increase Rt shoulder strength to greater than or equal to 4/5 to be able to put dishes away in 4 weeks.  Patient will score less than or equal to 15 % impaired on SPADI for decreased self-rated disability in 4 weeks.  Patient will report greater ease with ADLs in 4 weeks.  Patient will be independent with HEP for improved adherence to program in 4 weeks.      Long Term Goals  (Estimated time: 12 weeks)  Pain at worst less than or equal to 1/10 for all daily routine tasks in 8 weeks.  Patient will increase Rt shoulder ROM to 100 % to be able to perform ADLs independently in 8 weeks.  Patient will increase Rt shoulder strength greater than or equal to 4+/5 to be able to put dishes away in 8 weeks  Patient will increase Rt scapular strength greater than or equal to 4/5 to be able to performing overhead lifting in 8 weeks  Patient will score less than or equal to 5 % impaired on  SPADI for decreased self-rated disability in 8 weeks.  Patient will be able to sleep throughout the night without waking greater than 1 time secondary to pain in 8 weeks.  Patient will report no difficulty and require no adaptation with ADLs in 8 weeks   Patient will be able to return to work without difficulty or compensations in 8 weeks.       Plan  Patient would benefit from: skilled physical therapy    Planned therapy interventions: joint mobilization, manual therapy, therapeutic activities, stretching, strengthening, therapeutic exercise and home exercise program    Frequency: 2x week  Duration in weeks: 12  Plan of Care beginning date: 7/22/2025  Plan of Care expiration date: 10/14/2025  Treatment plan discussed with: patient    Subjective Evaluation    History of Present Illness  Date of onset: 2/1/2024  Mechanism of injury:  Right shoulder pain started 2024.  Pt was lifting heavy and went to PT for bicipital tendonitis.  2024 pt was diagnosed with pancreatic cancer and started Chemo in April.  Pt had surgery (whipple procedure in August).  Pt started lifting 2025.  Pt has a chemo port superior Rt pec.  Pt had a recent cortisone injection 7/15/25 with improvement.           Not a recurrent problem   Quality of life: good    Patient Goals  Patient goals for therapy: increased strength, decreased pain, independence with ADLs/IADLs, return to sport/leisure activities and increased motion    Pain  Current pain rating: 3  At best pain ratin  At worst pain ratin  Quality: discomfort, dull ache and tight  Relieving factors: rest  Aggravating factors: overhead activity  Progression: improved    Treatments  Previous treatment: injection treatment    Objective     Static Posture     Scapulae  Right anteriorly tipped.    Passive Range of Motion   Left Shoulder   Normal passive range of motion    Right Shoulder   Flexion: 150 degrees with pain  Abduction: 140 degrees with pain  External rotation 90°: 80 degrees with pain  Internal rotation 90°: 55 degrees     Strength/Myotome Testing     Left Shoulder   Normal muscle strength    Isolated Muscles   Lower trapezius: WFL   Middle trapezius: WFL     Right Shoulder     Planes of Motion   Flexion: 3+   Abduction: 4   External rotation at 0°: 4-   Internal rotation at 0°: 4+     Isolated Muscles   Lower trapezius: 4-   Middle trapezius: 4-     Additional Strength Details  Pain with resisted flexion > abduction. (Pain anterior)      Tests     Right Shoulder   Positive Neer's and Speed's.     Additional Tests Details  + Neer Impingement > Speed's           Precautions: none      Manuals             Post GH mobs sm            MFR post cuff sm                                      Neuro Re-Ed                                                                                                         Ther Ex 7/22            Scap retraction 3 sec x 15            S/L ER  3# 3 x 10            Bent over MT/LT with retract 2# 2 x 10            Scaption / Flexion with retraction 2# 2 x 10                                                                Ther Activity                                       Gait Training                                       Modalities

## 2025-07-25 ENCOUNTER — OFFICE VISIT (OUTPATIENT)
Age: 65
End: 2025-07-25
Payer: COMMERCIAL

## 2025-07-25 DIAGNOSIS — M25.811 IMPINGEMENT OF RIGHT SHOULDER: Primary | ICD-10-CM

## 2025-07-25 PROCEDURE — 97140 MANUAL THERAPY 1/> REGIONS: CPT

## 2025-07-25 PROCEDURE — 97110 THERAPEUTIC EXERCISES: CPT

## 2025-07-25 NOTE — PROGRESS NOTES
Daily Note     Today's date: 2025  Patient name: Brenton Saez  : 1960  MRN: [id number]  Referring provider: Charles Huff PA*  Dx:   Encounter Diagnosis     ICD-10-CM    1. Impingement of right shoulder  M25.811           Start Time: 0850  Stop Time: 0930  Total time in clinic (min): 40 minutes    Subjective: pt notes improvement.  Being more conscious of keeping scap retract      Objective: See treatment diary below      Assessment: Tolerated treatment well. Patient exhibited good technique with therapeutic exercises and would benefit from continued PT.  Progressed strengthening with emphasis on scap retract limiting form as needed.        Plan: Continue per plan of care.      Precautions: none      Manuals            Post GH mobs sm sm           MFR post cuff sm sm           R/s 4 way and IR ER with retract  X 5                        Neuro Re-Ed                                                    Ther Ex            UBE S8  R6.  2.5 min ea           Prone Ext rot @ 90  2# 2 x 10                        Supine chest press with retraction  10# 2 x 10           Scap retraction 3 sec x 15 T/o           S/L ER  3# 3 x 10 nt           Bent over MT/LT with retract 2# 2 x 10 Prone 2# 2 x 10           Scaption / Flexion with retraction 2# 2 x 10 2# 2 x 10           CC rows with retract  17.5# 2 x 10           CC chest press             CC retract   17.5# 2 x 15           Hor add with retract  2.5# + 2 h.p. 2 x 15           Ther Activity                                       Gait Training                                       Modalities

## 2025-08-01 ENCOUNTER — OFFICE VISIT (OUTPATIENT)
Age: 65
End: 2025-08-01
Payer: COMMERCIAL

## 2025-08-01 DIAGNOSIS — M25.811 IMPINGEMENT OF RIGHT SHOULDER: Primary | ICD-10-CM

## 2025-08-01 DIAGNOSIS — M75.81 RIGHT ROTATOR CUFF TENDINITIS: ICD-10-CM

## 2025-08-01 PROCEDURE — 97112 NEUROMUSCULAR REEDUCATION: CPT

## 2025-08-01 PROCEDURE — 97140 MANUAL THERAPY 1/> REGIONS: CPT

## 2025-08-01 PROCEDURE — 97110 THERAPEUTIC EXERCISES: CPT

## 2025-08-08 ENCOUNTER — OFFICE VISIT (OUTPATIENT)
Age: 65
End: 2025-08-08
Payer: COMMERCIAL

## 2025-08-08 DIAGNOSIS — G89.29 CHRONIC RIGHT SHOULDER PAIN: ICD-10-CM

## 2025-08-08 DIAGNOSIS — M75.81 RIGHT ROTATOR CUFF TENDINITIS: ICD-10-CM

## 2025-08-08 DIAGNOSIS — M25.511 CHRONIC RIGHT SHOULDER PAIN: ICD-10-CM

## 2025-08-08 DIAGNOSIS — M25.811 IMPINGEMENT OF RIGHT SHOULDER: Primary | ICD-10-CM

## 2025-08-08 PROCEDURE — 97140 MANUAL THERAPY 1/> REGIONS: CPT

## 2025-08-08 PROCEDURE — 97110 THERAPEUTIC EXERCISES: CPT

## 2025-08-15 ENCOUNTER — OFFICE VISIT (OUTPATIENT)
Age: 65
End: 2025-08-15
Payer: COMMERCIAL